# Patient Record
Sex: FEMALE | Race: OTHER | HISPANIC OR LATINO | URBAN - METROPOLITAN AREA
[De-identification: names, ages, dates, MRNs, and addresses within clinical notes are randomized per-mention and may not be internally consistent; named-entity substitution may affect disease eponyms.]

---

## 2021-04-06 ENCOUNTER — EMERGENCY (EMERGENCY)
Facility: HOSPITAL | Age: 81
LOS: 1 days | Discharge: HOME | End: 2021-04-06
Attending: EMERGENCY MEDICINE
Payer: COMMERCIAL

## 2021-04-06 VITALS
TEMPERATURE: 99 F | OXYGEN SATURATION: 98 % | DIASTOLIC BLOOD PRESSURE: 74 MMHG | RESPIRATION RATE: 18 BRPM | SYSTOLIC BLOOD PRESSURE: 117 MMHG | HEART RATE: 90 BPM | WEIGHT: 102.96 LBS

## 2021-04-06 LAB
ALBUMIN SERPL ELPH-MCNC: 3.6 G/DL — SIGNIFICANT CHANGE UP (ref 3.5–5)
ALP SERPL-CCNC: 84 U/L — SIGNIFICANT CHANGE UP (ref 40–120)
ALT FLD-CCNC: 20 U/L DA — SIGNIFICANT CHANGE UP (ref 10–60)
ANION GAP SERPL CALC-SCNC: 9 MMOL/L — SIGNIFICANT CHANGE UP (ref 5–17)
APPEARANCE UR: ABNORMAL
APTT BLD: 28.6 SEC — SIGNIFICANT CHANGE UP (ref 27.5–35.5)
AST SERPL-CCNC: 17 U/L — SIGNIFICANT CHANGE UP (ref 10–40)
BACTERIA # UR AUTO: ABNORMAL /HPF
BASOPHILS # BLD AUTO: 0.02 K/UL — SIGNIFICANT CHANGE UP (ref 0–0.2)
BASOPHILS NFR BLD AUTO: 0.2 % — SIGNIFICANT CHANGE UP (ref 0–2)
BILIRUB SERPL-MCNC: 0.3 MG/DL — SIGNIFICANT CHANGE UP (ref 0.2–1.2)
BILIRUB UR-MCNC: NEGATIVE — SIGNIFICANT CHANGE UP
BLD GP AB SCN SERPL QL: SIGNIFICANT CHANGE UP
BUN SERPL-MCNC: 20 MG/DL — HIGH (ref 7–18)
CALCIUM SERPL-MCNC: 8.9 MG/DL — SIGNIFICANT CHANGE UP (ref 8.4–10.5)
CHLORIDE SERPL-SCNC: 107 MMOL/L — SIGNIFICANT CHANGE UP (ref 96–108)
CO2 SERPL-SCNC: 23 MMOL/L — SIGNIFICANT CHANGE UP (ref 22–31)
COLOR SPEC: YELLOW — SIGNIFICANT CHANGE UP
CREAT SERPL-MCNC: 0.58 MG/DL — SIGNIFICANT CHANGE UP (ref 0.5–1.3)
DIFF PNL FLD: ABNORMAL
EOSINOPHIL # BLD AUTO: 0.03 K/UL — SIGNIFICANT CHANGE UP (ref 0–0.5)
EOSINOPHIL NFR BLD AUTO: 0.3 % — SIGNIFICANT CHANGE UP (ref 0–6)
EPI CELLS # UR: SIGNIFICANT CHANGE UP /HPF
GLUCOSE SERPL-MCNC: 112 MG/DL — HIGH (ref 70–99)
GLUCOSE UR QL: NEGATIVE — SIGNIFICANT CHANGE UP
HCT VFR BLD CALC: 39 % — SIGNIFICANT CHANGE UP (ref 34.5–45)
HGB BLD-MCNC: 12.3 G/DL — SIGNIFICANT CHANGE UP (ref 11.5–15.5)
IMM GRANULOCYTES NFR BLD AUTO: 0.3 % — SIGNIFICANT CHANGE UP (ref 0–1.5)
INR BLD: 1.04 RATIO — SIGNIFICANT CHANGE UP (ref 0.88–1.16)
KETONES UR-MCNC: ABNORMAL
LEUKOCYTE ESTERASE UR-ACNC: ABNORMAL
LIDOCAIN IGE QN: 167 U/L — SIGNIFICANT CHANGE UP (ref 73–393)
LYMPHOCYTES # BLD AUTO: 1.34 K/UL — SIGNIFICANT CHANGE UP (ref 1–3.3)
LYMPHOCYTES # BLD AUTO: 14.2 % — SIGNIFICANT CHANGE UP (ref 13–44)
MCHC RBC-ENTMCNC: 27.2 PG — SIGNIFICANT CHANGE UP (ref 27–34)
MCHC RBC-ENTMCNC: 31.5 GM/DL — LOW (ref 32–36)
MCV RBC AUTO: 86.1 FL — SIGNIFICANT CHANGE UP (ref 80–100)
MONOCYTES # BLD AUTO: 0.7 K/UL — SIGNIFICANT CHANGE UP (ref 0–0.9)
MONOCYTES NFR BLD AUTO: 7.4 % — SIGNIFICANT CHANGE UP (ref 2–14)
NEUTROPHILS # BLD AUTO: 7.33 K/UL — SIGNIFICANT CHANGE UP (ref 1.8–7.4)
NEUTROPHILS NFR BLD AUTO: 77.6 % — HIGH (ref 43–77)
NITRITE UR-MCNC: POSITIVE
NRBC # BLD: 0 /100 WBCS — SIGNIFICANT CHANGE UP (ref 0–0)
PH UR: 5 — SIGNIFICANT CHANGE UP (ref 5–8)
PLATELET # BLD AUTO: 235 K/UL — SIGNIFICANT CHANGE UP (ref 150–400)
POTASSIUM SERPL-MCNC: 4.1 MMOL/L — SIGNIFICANT CHANGE UP (ref 3.5–5.3)
POTASSIUM SERPL-SCNC: 4.1 MMOL/L — SIGNIFICANT CHANGE UP (ref 3.5–5.3)
PROT SERPL-MCNC: 7.7 G/DL — SIGNIFICANT CHANGE UP (ref 6–8.3)
PROT UR-MCNC: 15
PROTHROM AB SERPL-ACNC: 12.4 SEC — SIGNIFICANT CHANGE UP (ref 10.6–13.6)
RBC # BLD: 4.53 M/UL — SIGNIFICANT CHANGE UP (ref 3.8–5.2)
RBC # FLD: 13.5 % — SIGNIFICANT CHANGE UP (ref 10.3–14.5)
RBC CASTS # UR COMP ASSIST: ABNORMAL /HPF (ref 0–2)
SARS-COV-2 RNA SPEC QL NAA+PROBE: SIGNIFICANT CHANGE UP
SODIUM SERPL-SCNC: 139 MMOL/L — SIGNIFICANT CHANGE UP (ref 135–145)
SP GR SPEC: 1.02 — SIGNIFICANT CHANGE UP (ref 1.01–1.02)
UROBILINOGEN FLD QL: 1
WBC # BLD: 9.45 K/UL — SIGNIFICANT CHANGE UP (ref 3.8–10.5)
WBC # FLD AUTO: 9.45 K/UL — SIGNIFICANT CHANGE UP (ref 3.8–10.5)
WBC UR QL: SIGNIFICANT CHANGE UP /HPF (ref 0–5)

## 2021-04-06 PROCEDURE — 71260 CT THORAX DX C+: CPT | Mod: 26,MA

## 2021-04-06 PROCEDURE — 99285 EMERGENCY DEPT VISIT HI MDM: CPT

## 2021-04-06 PROCEDURE — 70450 CT HEAD/BRAIN W/O DYE: CPT | Mod: 26,MA

## 2021-04-06 PROCEDURE — 74177 CT ABD & PELVIS W/CONTRAST: CPT | Mod: 26,MA

## 2021-04-06 PROCEDURE — 71045 X-RAY EXAM CHEST 1 VIEW: CPT | Mod: 26

## 2021-04-06 RX ORDER — CEFTRIAXONE 500 MG/1
1000 INJECTION, POWDER, FOR SOLUTION INTRAMUSCULAR; INTRAVENOUS ONCE
Refills: 0 | Status: COMPLETED | OUTPATIENT
Start: 2021-04-06 | End: 2021-04-06

## 2021-04-06 NOTE — ED PROVIDER NOTE - PATIENT PORTAL LINK FT
You can access the FollowMyHealth Patient Portal offered by [facility] by registering at the following website: [email or web address]. By joining [employer]’s FollowMyHealth portal, you will also be able to view your health information using other applications (apps) compatible with our system.

## 2021-04-06 NOTE — ED PROVIDER NOTE - WORK/EXCUSE FORM DATE
[FreeTextEntry1] : We reviewed the results of the sonogram from last week, and gave reassurances. \par \par The results of her GCT as well as the fasting and post prandial values were reviewed. Her fasting and post prandial values are all within normal range, and will continue to be managed with dietary adjustments. \par \par She is scheduled to have a telehealth consultation with [facility] in 1 week.\par \par We discussed the management plan as far as fetal monitoring going forward. 
09-Apr-2021

## 2021-04-06 NOTE — ED PROVIDER NOTE - CLINICAL SUMMARY MEDICAL DECISION MAKING FREE TEXT BOX
Significant pain and tenderness to palpation of left abdomen and left flank after fall. Will get xrays, blood work, Ct scan to r/o retroperitoneal bleed, spleen lac. Vital signs are normal stable.

## 2021-04-06 NOTE — ED PROVIDER NOTE - OBJECTIVE STATEMENT
81 y/o F pt, as per pt no medical history and not on any medications c/o fall yesterday. Pt states that she lost balance and fell down. Since fall, patient has been experiencing significant left sided abdominal pain. Denies any headache, dizziness, neuro symptoms, extremity pains, nausea, vomiting, diarrhea, respiratory symptoms.

## 2021-04-06 NOTE — ED PROVIDER NOTE - NSFOLLOWUPINSTRUCTIONS_ED_ALL_ED_FT
Urinary Tract Infection in Older Adults    WHAT YOU NEED TO KNOW:    What is a urinary tract infection? A urinary tract infection (UTI) is caused by bacteria that get inside your urinary tract. Your urinary tract includes your kidneys, ureters, bladder, and urethra. Urine is made in your kidneys, and it flows from the ureters to the bladder. Urine leaves the bladder through the urethra. A UTI is more common in your lower urinary tract, which includes your bladder and urethra.    Kidney, Ureters, Bladder         What increases my risk for a UTI?   •A UTI within the last 3 months      •Menopause in women      •Enlarged prostate in men      •Medicines that affect urination      •Urinary incontinence (you cannot control your bladder)      •Sexual intercourse      •Medical conditions, such as diabetes or obesity      •Urinary tract problems, such as a narrowing, kidney stones, or inability to empty your bladder completely      What are the signs and symptoms of a UTI?   •Fever and chills      •Pain or burning when you urinate      •Urine that smells bad or looks cloudy, or blood in your urine      •Urinating more often or waking from sleep to urinate      •Sudden, strong need to urinate      •Pain or pressure in your lower abdomen      •Leaking urine      •Confusion or agitation      •Fatigue, shakiness, and weakness      How is a UTI diagnosed? Your healthcare provider will ask about your symptoms. He or she may also examine you. You may need any of the following:   •A urinalysis will give information about your urinary tract and overall health.      •A urine culture may show the type of germ causing the infection. You may need this test again if you continue to have signs and symptoms after a UTI is treated.      How is a UTI treated? Medicines treat the bacterial infection or decrease pain and burning when you urinate. You may also need medicines to decrease the urge to urinate often. If you have UTIs often (called recurrent UTIs), you may be given antibiotics to take regularly. You will be given directions for when and how to use antibiotics. The goal is to prevent UTIs but not cause antibiotic resistance by using antibiotics too often.    How can I manage my symptoms?   •Drink liquids as directed. Liquids can help flush bacteria from your urinary tract. Ask how much liquid to drink each day and which liquids are best for you. You may need to drink more liquids than usual to help flush out the bacteria. Do not drink alcohol, caffeine, and citrus juices. These can irritate your bladder and increase your symptoms.      •Apply heat on your abdomen for 20 to 30 minutes every 2 hours for as many days as directed. Heat helps decrease discomfort and pressure in your bladder.      How can I help prevent a UTI?   •Urinate when you feel the urge. Do not hold your urine. Bacteria can grow if urine stays in the bladder too long. It may be helpful to urinate at least every 3 to 4 hours.      •Urinate after you have sex to flush away bacteria that can enter your urinary tract during sex.      •Wear cotton underwear and clothes that are loose. Tight pants and nylon underwear can trap moisture and cause bacteria to grow.      •Cranberry juice or cranberry supplements may help prevent UTIs. Your healthcare provider can recommend the right juice or supplement for you.      •Women should wipe front to back after urinating or having a bowel movement. This may prevent germs from getting into the urinary tract. Do not douche or use feminine deodorants. These can change the chemical balance in your vagina. You may also be given vaginal estrogen medicine. This medicine helps prevent recurrent UTIs in women who have gone through menopause or are in frederic-menopause.      When should I seek immediate care?   •You are urinating very little or not at all.      •You are vomiting.      •You have a high fever with shaking chills.       •You have side or back pain that gets worse.      When should I call my doctor?   •You have a fever.      •You are a woman and you have increased white or yellow discharge from your vagina.      •You do not feel better after 2 days of taking antibiotics.      •You have questions or concerns about your condition or care.      CARE AGREEMENT:    You have the right to help plan your care. Learn about your health condition and how it may be treated. Discuss treatment options with your healthcare providers to decide what care you want to receive. You always have the right to refuse treatment.

## 2021-04-06 NOTE — ED PROVIDER NOTE - PROGRESS NOTE DETAILS
wide mediastinum on CXR, will add CT chest Blaine DO: Received sign out from Dr. San.   IMPRESSION:  CT CHEST: No gross acute abnormality of the chest.  CT ABDOMEN AND PELVIS:  1. Moderate loss of height of the L1 and L3 vertebral bodies of questionable age, demonstrating very mild bony retropulsion, however may be chronic. Recommend clinical correlation.  2. Otherwise, no acute abnormality within the abdomen and pelvis demonstrated. Pt is well appearing, denies current pain and adamantly requesting discharge. pt states will take taxi home and has HHA.   Pt is well appearing, has no new complaints and able to walk with normal gait. Pt is stable for discharge and follow up with medical doctor. Pt educated on care and need for follow up. Discussed anticipatory guidance and return precautions. Questions answered. I had a detailed discussion with the patient and/or guardian regarding the historical points, exam findings, and any diagnostic results supporting the discharge diagnosis. [facility] transport arranged for pt.

## 2021-04-06 NOTE — ED ADULT TRIAGE NOTE - CHIEF COMPLAINT QUOTE
pain to left lower ribs and left hip after rolling out of bed and fell to carpeted floor, denies LOC

## 2021-04-06 NOTE — ED PROVIDER NOTE - CARE PROVIDER_API CALL
Aiden Karimi  Internal Medicine  [address] [place] [address]  Phone: [phone]  Fax: [phone]  Follow Up Time:

## 2021-04-07 VITALS
RESPIRATION RATE: 18 BRPM | TEMPERATURE: 98 F | SYSTOLIC BLOOD PRESSURE: 132 MMHG | HEART RATE: 64 BPM | OXYGEN SATURATION: 96 % | DIASTOLIC BLOOD PRESSURE: 64 MMHG

## 2021-04-07 PROCEDURE — 86900 BLOOD TYPING SEROLOGIC ABO: CPT

## 2021-04-07 PROCEDURE — 36415 COLL VENOUS BLD VENIPUNCTURE: CPT

## 2021-04-07 PROCEDURE — 86901 BLOOD TYPING SEROLOGIC RH(D): CPT

## 2021-04-07 PROCEDURE — 70450 CT HEAD/BRAIN W/O DYE: CPT

## 2021-04-07 PROCEDURE — 85730 THROMBOPLASTIN TIME PARTIAL: CPT

## 2021-04-07 PROCEDURE — 85025 COMPLETE CBC W/AUTO DIFF WBC: CPT

## 2021-04-07 PROCEDURE — 80053 COMPREHEN METABOLIC PANEL: CPT

## 2021-04-07 PROCEDURE — 83690 ASSAY OF LIPASE: CPT

## 2021-04-07 PROCEDURE — 71045 X-RAY EXAM CHEST 1 VIEW: CPT

## 2021-04-07 PROCEDURE — 71260 CT THORAX DX C+: CPT

## 2021-04-07 PROCEDURE — 93005 ELECTROCARDIOGRAM TRACING: CPT

## 2021-04-07 PROCEDURE — 86850 RBC ANTIBODY SCREEN: CPT

## 2021-04-07 PROCEDURE — 85610 PROTHROMBIN TIME: CPT

## 2021-04-07 PROCEDURE — 74177 CT ABD & PELVIS W/CONTRAST: CPT

## 2021-04-07 PROCEDURE — 96374 THER/PROPH/DIAG INJ IV PUSH: CPT | Mod: XU

## 2021-04-07 PROCEDURE — 99284 EMERGENCY DEPT VISIT MOD MDM: CPT | Mod: 25

## 2021-04-07 PROCEDURE — 81001 URINALYSIS AUTO W/SCOPE: CPT

## 2021-04-07 PROCEDURE — 87635 SARS-COV-2 COVID-19 AMP PRB: CPT

## 2021-04-07 RX ORDER — CEFUROXIME AXETIL 250 MG
1 TABLET ORAL
Qty: 14 | Refills: 0
Start: 2021-04-07 | End: 2021-04-13

## 2021-04-07 RX ORDER — ACETAMINOPHEN 500 MG
650 TABLET ORAL ONCE
Refills: 0 | Status: COMPLETED | OUTPATIENT
Start: 2021-04-07 | End: 2021-04-07

## 2021-04-07 RX ADMIN — Medication 650 MILLIGRAM(S): at 00:42

## 2021-04-07 RX ADMIN — CEFTRIAXONE 100 MILLIGRAM(S): 500 INJECTION, POWDER, FOR SOLUTION INTRAMUSCULAR; INTRAVENOUS at 00:02

## 2021-04-07 NOTE — ED ADULT NURSE NOTE - NSIMPLEMENTINTERV_GEN_ALL_ED
Implemented All Fall Risk Interventions:  [place] to call system. Call bell, personal items and telephone within reach. Instruct patient to call for assistance. Room bathroom lighting operational. Non-slip footwear when patient is off stretcher. Physically safe environment: no spills, clutter or unnecessary equipment. Stretcher in lowest position, wheels locked, appropriate side rails in place. Provide visual cue, wrist band, yellow gown, etc. Monitor gait and stability. Monitor for mental status changes and reorient to person, place, and time. Review medications for side effects contributing to fall risk. Reinforce activity limits and safety measures with patient and family.

## 2021-04-10 ENCOUNTER — EMERGENCY (EMERGENCY)
Facility: HOSPITAL | Age: 81
LOS: 1 days | Discharge: HOME | End: 2021-04-10
Attending: STUDENT IN AN ORGANIZED HEALTH CARE EDUCATION/TRAINING PROGRAM
Payer: COMMERCIAL

## 2021-04-10 VITALS
TEMPERATURE: 99 F | HEART RATE: 87 BPM | RESPIRATION RATE: 18 BRPM | SYSTOLIC BLOOD PRESSURE: 152 MMHG | OXYGEN SATURATION: 98 % | DIASTOLIC BLOOD PRESSURE: 82 MMHG

## 2021-04-10 VITALS
TEMPERATURE: 98 F | HEART RATE: 89 BPM | OXYGEN SATURATION: 99 % | WEIGHT: 102.96 LBS | DIASTOLIC BLOOD PRESSURE: 74 MMHG | SYSTOLIC BLOOD PRESSURE: 122 MMHG | RESPIRATION RATE: 18 BRPM

## 2021-04-10 PROBLEM — Z78.9 OTHER SPECIFIED HEALTH STATUS: Chronic | Status: ACTIVE | Noted: 2021-04-06

## 2021-04-10 LAB
ALBUMIN SERPL ELPH-MCNC: 3.7 G/DL — SIGNIFICANT CHANGE UP (ref 3.5–5)
ALP SERPL-CCNC: 76 U/L — SIGNIFICANT CHANGE UP (ref 40–120)
ALT FLD-CCNC: 20 U/L DA — SIGNIFICANT CHANGE UP (ref 10–60)
ANION GAP SERPL CALC-SCNC: 7 MMOL/L — SIGNIFICANT CHANGE UP (ref 5–17)
APPEARANCE UR: CLEAR — SIGNIFICANT CHANGE UP
AST SERPL-CCNC: 36 U/L — SIGNIFICANT CHANGE UP (ref 10–40)
BACTERIA # UR AUTO: ABNORMAL /HPF
BASOPHILS # BLD AUTO: 0.01 K/UL — SIGNIFICANT CHANGE UP (ref 0–0.2)
BASOPHILS NFR BLD AUTO: 0.2 % — SIGNIFICANT CHANGE UP (ref 0–2)
BILIRUB SERPL-MCNC: 0.5 MG/DL — SIGNIFICANT CHANGE UP (ref 0.2–1.2)
BILIRUB UR-MCNC: NEGATIVE — SIGNIFICANT CHANGE UP
BUN SERPL-MCNC: 16 MG/DL — SIGNIFICANT CHANGE UP (ref 7–18)
CALCIUM SERPL-MCNC: 9 MG/DL — SIGNIFICANT CHANGE UP (ref 8.4–10.5)
CHLORIDE SERPL-SCNC: 107 MMOL/L — SIGNIFICANT CHANGE UP (ref 96–108)
CO2 SERPL-SCNC: 24 MMOL/L — SIGNIFICANT CHANGE UP (ref 22–31)
COLOR SPEC: YELLOW — SIGNIFICANT CHANGE UP
CREAT SERPL-MCNC: 0.65 MG/DL — SIGNIFICANT CHANGE UP (ref 0.5–1.3)
DIFF PNL FLD: ABNORMAL
EOSINOPHIL # BLD AUTO: 0.03 K/UL — SIGNIFICANT CHANGE UP (ref 0–0.5)
EOSINOPHIL NFR BLD AUTO: 0.7 % — SIGNIFICANT CHANGE UP (ref 0–6)
EPI CELLS # UR: ABNORMAL /HPF
GLUCOSE SERPL-MCNC: 102 MG/DL — HIGH (ref 70–99)
GLUCOSE UR QL: NEGATIVE — SIGNIFICANT CHANGE UP
HCT VFR BLD CALC: 39.7 % — SIGNIFICANT CHANGE UP (ref 34.5–45)
HGB BLD-MCNC: 12.7 G/DL — SIGNIFICANT CHANGE UP (ref 11.5–15.5)
IMM GRANULOCYTES NFR BLD AUTO: 0.2 % — SIGNIFICANT CHANGE UP (ref 0–1.5)
KETONES UR-MCNC: NEGATIVE — SIGNIFICANT CHANGE UP
LACTATE SERPL-SCNC: 1.3 MMOL/L — SIGNIFICANT CHANGE UP (ref 0.7–2)
LEUKOCYTE ESTERASE UR-ACNC: ABNORMAL
LIDOCAIN IGE QN: 118 U/L — SIGNIFICANT CHANGE UP (ref 73–393)
LYMPHOCYTES # BLD AUTO: 1.01 K/UL — SIGNIFICANT CHANGE UP (ref 1–3.3)
LYMPHOCYTES # BLD AUTO: 22.9 % — SIGNIFICANT CHANGE UP (ref 13–44)
MAGNESIUM SERPL-MCNC: 2.4 MG/DL — SIGNIFICANT CHANGE UP (ref 1.6–2.6)
MCHC RBC-ENTMCNC: 28 PG — SIGNIFICANT CHANGE UP (ref 27–34)
MCHC RBC-ENTMCNC: 32 GM/DL — SIGNIFICANT CHANGE UP (ref 32–36)
MCV RBC AUTO: 87.4 FL — SIGNIFICANT CHANGE UP (ref 80–100)
MONOCYTES # BLD AUTO: 0.48 K/UL — SIGNIFICANT CHANGE UP (ref 0–0.9)
MONOCYTES NFR BLD AUTO: 10.9 % — SIGNIFICANT CHANGE UP (ref 2–14)
NEUTROPHILS # BLD AUTO: 2.88 K/UL — SIGNIFICANT CHANGE UP (ref 1.8–7.4)
NEUTROPHILS NFR BLD AUTO: 65.1 % — SIGNIFICANT CHANGE UP (ref 43–77)
NITRITE UR-MCNC: NEGATIVE — SIGNIFICANT CHANGE UP
NRBC # BLD: 0 /100 WBCS — SIGNIFICANT CHANGE UP (ref 0–0)
PH UR: 7 — SIGNIFICANT CHANGE UP (ref 5–8)
PLATELET # BLD AUTO: 259 K/UL — SIGNIFICANT CHANGE UP (ref 150–400)
POTASSIUM SERPL-MCNC: 4.7 MMOL/L — SIGNIFICANT CHANGE UP (ref 3.5–5.3)
POTASSIUM SERPL-SCNC: 4.7 MMOL/L — SIGNIFICANT CHANGE UP (ref 3.5–5.3)
PROT SERPL-MCNC: 8.2 G/DL — SIGNIFICANT CHANGE UP (ref 6–8.3)
PROT UR-MCNC: 15
RBC # BLD: 4.54 M/UL — SIGNIFICANT CHANGE UP (ref 3.8–5.2)
RBC # FLD: 13.7 % — SIGNIFICANT CHANGE UP (ref 10.3–14.5)
RBC CASTS # UR COMP ASSIST: ABNORMAL /HPF (ref 0–2)
SODIUM SERPL-SCNC: 138 MMOL/L — SIGNIFICANT CHANGE UP (ref 135–145)
SP GR SPEC: 1.01 — SIGNIFICANT CHANGE UP (ref 1.01–1.02)
UROBILINOGEN FLD QL: NEGATIVE — SIGNIFICANT CHANGE UP
WBC # BLD: 4.42 K/UL — SIGNIFICANT CHANGE UP (ref 3.8–10.5)
WBC # FLD AUTO: 4.42 K/UL — SIGNIFICANT CHANGE UP (ref 3.8–10.5)
WBC UR QL: ABNORMAL /HPF (ref 0–5)

## 2021-04-10 PROCEDURE — 83690 ASSAY OF LIPASE: CPT

## 2021-04-10 PROCEDURE — 96365 THER/PROPH/DIAG IV INF INIT: CPT | Mod: XU

## 2021-04-10 PROCEDURE — 96361 HYDRATE IV INFUSION ADD-ON: CPT

## 2021-04-10 PROCEDURE — 80053 COMPREHEN METABOLIC PANEL: CPT

## 2021-04-10 PROCEDURE — 81001 URINALYSIS AUTO W/SCOPE: CPT

## 2021-04-10 PROCEDURE — 87086 URINE CULTURE/COLONY COUNT: CPT

## 2021-04-10 PROCEDURE — 99285 EMERGENCY DEPT VISIT HI MDM: CPT

## 2021-04-10 PROCEDURE — 36415 COLL VENOUS BLD VENIPUNCTURE: CPT

## 2021-04-10 PROCEDURE — 85025 COMPLETE CBC W/AUTO DIFF WBC: CPT

## 2021-04-10 PROCEDURE — 83605 ASSAY OF LACTIC ACID: CPT

## 2021-04-10 PROCEDURE — 99284 EMERGENCY DEPT VISIT MOD MDM: CPT | Mod: 25

## 2021-04-10 PROCEDURE — 74177 CT ABD & PELVIS W/CONTRAST: CPT | Mod: 26,MA

## 2021-04-10 PROCEDURE — 74177 CT ABD & PELVIS W/CONTRAST: CPT

## 2021-04-10 PROCEDURE — 83735 ASSAY OF MAGNESIUM: CPT

## 2021-04-10 RX ORDER — CEFTRIAXONE 500 MG/1
1000 INJECTION, POWDER, FOR SOLUTION INTRAMUSCULAR; INTRAVENOUS ONCE
Refills: 0 | Status: COMPLETED | OUTPATIENT
Start: 2021-04-10 | End: 2021-04-10

## 2021-04-10 RX ORDER — SODIUM CHLORIDE 9 MG/ML
1000 INJECTION INTRAMUSCULAR; INTRAVENOUS; SUBCUTANEOUS ONCE
Refills: 0 | Status: COMPLETED | OUTPATIENT
Start: 2021-04-10 | End: 2021-04-10

## 2021-04-10 RX ORDER — CEPHALEXIN 500 MG
1 CAPSULE ORAL
Qty: 10 | Refills: 0
Start: 2021-04-10 | End: 2021-04-14

## 2021-04-10 RX ADMIN — SODIUM CHLORIDE 1000 MILLILITER(S): 9 INJECTION INTRAMUSCULAR; INTRAVENOUS; SUBCUTANEOUS at 12:25

## 2021-04-10 RX ADMIN — CEFTRIAXONE 100 MILLIGRAM(S): 500 INJECTION, POWDER, FOR SOLUTION INTRAMUSCULAR; INTRAVENOUS at 15:25

## 2021-04-10 RX ADMIN — CEFTRIAXONE 1000 MILLIGRAM(S): 500 INJECTION, POWDER, FOR SOLUTION INTRAMUSCULAR; INTRAVENOUS at 15:49

## 2021-04-10 RX ADMIN — SODIUM CHLORIDE 1000 MILLILITER(S): 9 INJECTION INTRAMUSCULAR; INTRAVENOUS; SUBCUTANEOUS at 11:54

## 2021-04-10 NOTE — ED PROVIDER NOTE - PATIENT PORTAL LINK FT
You can access the FollowMyHealth Patient Portal offered by [facility] by registering at the following website: [email or web address]. By joining [employer]’s FollowMyHealth portal, you will also be able to view your health information using other applications (apps) compatible with our system.

## 2021-04-10 NOTE — ED PROVIDER NOTE - CLINICAL SUMMARY MEDICAL DECISION MAKING FREE TEXT BOX
80F presenting with luq abdominal pain. has tenderness on exam. seen in ED recently after a fall and treated for uti. concern for pancreatitis, pyelo. will get labs, CT abdomen. will reassess.

## 2021-04-10 NOTE — ED PROVIDER NOTE - PROGRESS NOTE DETAILS
patient updated on results. reports symptoms improved. no tenderness on re-exam. will discharge. Lee Rowe

## 2021-04-10 NOTE — ED PROVIDER NOTE - PHYSICAL EXAMINATION
General: well appearing female, no acute distress   HEENT: normocephalic, atraumatic   Respiratory: normal work of breathing, lungs clear to auscultation bilaterally   Cardiac: regular rate and rhythm   Abdomen: soft, LUQ tenderness to palpation, no guarding or rebound   MSK: no swelling or tenderness of lower extremities, moving all extremities spontaneously   Skin: warm, dry   Neuro: A&Ox3  Psych: appropriate affect

## 2021-04-10 NOTE — ED ADULT NURSE NOTE - OBJECTIVE STATEMENT
As per pt, c/o LUQ pain x3days. PT denies any radiation of pain, h/a, SOB, CP, n/v/d, f/c, or cough.

## 2021-04-10 NOTE — ED ADULT NURSE NOTE - CAS TRG GENERAL AIRWAY, MLM
Alert-The patient is alert, awake and responds to voice. The patient is oriented to time, place, and person. The triage nurse is able to obtain subjective information. [employer]

## 2021-04-10 NOTE — ED PROVIDER NOTE - NSFOLLOWUPINSTRUCTIONS_ED_ALL_ED_FT
You were seen in the emergency department for abdominal pain.     Please follow-up with your primary care doctor in the next 24-48 hours.     If you have any worsening pain, severe chest pain, shortness of breath, nausea or vomiting, please return to the emergency department. Lo vieron en el departamento de emergencias por dolor abdominal y se descubrió que tenía orlando infección urinaria.    Rosa un seguimiento con koenig médico de atención primaria en las próximas 24 a 48 horas.    Termine todos luisa antibióticos incluso si luisa síntomas mejoran.    Si tiene un dolor que empeora, dolor severo en el pecho, dificultad para respirar, náuseas o vómitos, regrese al departamento de emergencias.    Translation via Google Translate. Cannot guarantee accuracy.     You were seen in the emergency department for abdominal pain and were found to have a UTI.  Please follow-up with your primary care doctor in the next 24-48 hours.   Please finish all of your antibiotics even if your symptoms improve.   If you have any worsening pain, severe chest pain, shortness of breath, nausea or vomiting, please return to the emergency department.

## 2021-04-10 NOTE — ED ADULT NURSE NOTE - COVID-19 ORDERING FACILITY
Spine appears normal, range of motion is not limited, no muscle or joint tenderness
[facility]

## 2021-04-10 NOTE — ED PROVIDER NOTE - OBJECTIVE STATEMENT
80F, no significant pmh, presenting with abdominal pain. patient reports three days of left upper abdominal pain. has some nausea but no vomiting, pain or burning with urination, pain or swelling of lower extremities.

## 2021-04-11 LAB
CULTURE RESULTS: NO GROWTH — SIGNIFICANT CHANGE UP
SPECIMEN SOURCE: SIGNIFICANT CHANGE UP

## 2021-07-02 ENCOUNTER — EMERGENCY (EMERGENCY)
Facility: HOSPITAL | Age: 81
LOS: 1 days | Discharge: HOME | End: 2021-07-02
Attending: EMERGENCY MEDICINE
Payer: COMMERCIAL

## 2021-07-02 VITALS
RESPIRATION RATE: 18 BRPM | HEART RATE: 74 BPM | DIASTOLIC BLOOD PRESSURE: 70 MMHG | SYSTOLIC BLOOD PRESSURE: 121 MMHG | OXYGEN SATURATION: 95 % | TEMPERATURE: 98 F | WEIGHT: 104.94 LBS

## 2021-07-02 LAB
ALBUMIN SERPL ELPH-MCNC: 3.3 G/DL — LOW (ref 3.5–5)
ALP SERPL-CCNC: 86 U/L — SIGNIFICANT CHANGE UP (ref 40–120)
ALT FLD-CCNC: 40 U/L DA — SIGNIFICANT CHANGE UP (ref 10–60)
ANION GAP SERPL CALC-SCNC: 8 MMOL/L — SIGNIFICANT CHANGE UP (ref 5–17)
AST SERPL-CCNC: 39 U/L — SIGNIFICANT CHANGE UP (ref 10–40)
BASOPHILS # BLD AUTO: 0.02 K/UL — SIGNIFICANT CHANGE UP (ref 0–0.2)
BASOPHILS NFR BLD AUTO: 0.3 % — SIGNIFICANT CHANGE UP (ref 0–2)
BILIRUB SERPL-MCNC: 0.4 MG/DL — SIGNIFICANT CHANGE UP (ref 0.2–1.2)
BUN SERPL-MCNC: 23 MG/DL — HIGH (ref 7–18)
CALCIUM SERPL-MCNC: 8.8 MG/DL — SIGNIFICANT CHANGE UP (ref 8.4–10.5)
CHLORIDE SERPL-SCNC: 111 MMOL/L — HIGH (ref 96–108)
CO2 SERPL-SCNC: 21 MMOL/L — LOW (ref 22–31)
CREAT SERPL-MCNC: 0.57 MG/DL — SIGNIFICANT CHANGE UP (ref 0.5–1.3)
EOSINOPHIL # BLD AUTO: 0.1 K/UL — SIGNIFICANT CHANGE UP (ref 0–0.5)
EOSINOPHIL NFR BLD AUTO: 1.6 % — SIGNIFICANT CHANGE UP (ref 0–6)
GLUCOSE SERPL-MCNC: 101 MG/DL — HIGH (ref 70–99)
HCT VFR BLD CALC: 39.1 % — SIGNIFICANT CHANGE UP (ref 34.5–45)
HGB BLD-MCNC: 12.4 G/DL — SIGNIFICANT CHANGE UP (ref 11.5–15.5)
IMM GRANULOCYTES NFR BLD AUTO: 0.5 % — SIGNIFICANT CHANGE UP (ref 0–1.5)
LYMPHOCYTES # BLD AUTO: 1.12 K/UL — SIGNIFICANT CHANGE UP (ref 1–3.3)
LYMPHOCYTES # BLD AUTO: 17.5 % — SIGNIFICANT CHANGE UP (ref 13–44)
MCHC RBC-ENTMCNC: 27.5 PG — SIGNIFICANT CHANGE UP (ref 27–34)
MCHC RBC-ENTMCNC: 31.7 GM/DL — LOW (ref 32–36)
MCV RBC AUTO: 86.7 FL — SIGNIFICANT CHANGE UP (ref 80–100)
MONOCYTES # BLD AUTO: 0.74 K/UL — SIGNIFICANT CHANGE UP (ref 0–0.9)
MONOCYTES NFR BLD AUTO: 11.6 % — SIGNIFICANT CHANGE UP (ref 2–14)
NEUTROPHILS # BLD AUTO: 4.39 K/UL — SIGNIFICANT CHANGE UP (ref 1.8–7.4)
NEUTROPHILS NFR BLD AUTO: 68.5 % — SIGNIFICANT CHANGE UP (ref 43–77)
NRBC # BLD: 0 /100 WBCS — SIGNIFICANT CHANGE UP (ref 0–0)
PLATELET # BLD AUTO: 230 K/UL — SIGNIFICANT CHANGE UP (ref 150–400)
POTASSIUM SERPL-MCNC: 4.8 MMOL/L — SIGNIFICANT CHANGE UP (ref 3.5–5.3)
POTASSIUM SERPL-SCNC: 4.8 MMOL/L — SIGNIFICANT CHANGE UP (ref 3.5–5.3)
PROT SERPL-MCNC: 7.8 G/DL — SIGNIFICANT CHANGE UP (ref 6–8.3)
RBC # BLD: 4.51 M/UL — SIGNIFICANT CHANGE UP (ref 3.8–5.2)
RBC # FLD: 12.9 % — SIGNIFICANT CHANGE UP (ref 10.3–14.5)
SODIUM SERPL-SCNC: 140 MMOL/L — SIGNIFICANT CHANGE UP (ref 135–145)
WBC # BLD: 6.4 K/UL — SIGNIFICANT CHANGE UP (ref 3.8–10.5)
WBC # FLD AUTO: 6.4 K/UL — SIGNIFICANT CHANGE UP (ref 3.8–10.5)

## 2021-07-02 PROCEDURE — 36415 COLL VENOUS BLD VENIPUNCTURE: CPT

## 2021-07-02 PROCEDURE — 70450 CT HEAD/BRAIN W/O DYE: CPT | Mod: 26,MA

## 2021-07-02 PROCEDURE — 85025 COMPLETE CBC W/AUTO DIFF WBC: CPT

## 2021-07-02 PROCEDURE — 96374 THER/PROPH/DIAG INJ IV PUSH: CPT

## 2021-07-02 PROCEDURE — 99284 EMERGENCY DEPT VISIT MOD MDM: CPT | Mod: 25

## 2021-07-02 PROCEDURE — 99285 EMERGENCY DEPT VISIT HI MDM: CPT

## 2021-07-02 PROCEDURE — 80053 COMPREHEN METABOLIC PANEL: CPT

## 2021-07-02 PROCEDURE — 70450 CT HEAD/BRAIN W/O DYE: CPT | Mod: MA

## 2021-07-02 RX ORDER — SODIUM CHLORIDE 9 MG/ML
1000 INJECTION INTRAMUSCULAR; INTRAVENOUS; SUBCUTANEOUS ONCE
Refills: 0 | Status: COMPLETED | OUTPATIENT
Start: 2021-07-02 | End: 2021-07-02

## 2021-07-02 RX ORDER — ACETAMINOPHEN 500 MG
650 TABLET ORAL ONCE
Refills: 0 | Status: COMPLETED | OUTPATIENT
Start: 2021-07-02 | End: 2021-07-02

## 2021-07-02 RX ORDER — KETOROLAC TROMETHAMINE 30 MG/ML
15 SYRINGE (ML) INJECTION ONCE
Refills: 0 | Status: DISCONTINUED | OUTPATIENT
Start: 2021-07-02 | End: 2021-07-02

## 2021-07-02 RX ADMIN — SODIUM CHLORIDE 1000 MILLILITER(S): 9 INJECTION INTRAMUSCULAR; INTRAVENOUS; SUBCUTANEOUS at 16:51

## 2021-07-02 RX ADMIN — Medication 15 MILLIGRAM(S): at 19:05

## 2021-07-02 RX ADMIN — Medication 650 MILLIGRAM(S): at 16:52

## 2021-07-02 NOTE — ED PROVIDER NOTE - CLINICAL SUMMARY MEDICAL DECISION MAKING FREE TEXT BOX
81 y/o F patient presents to the ED with c/o atypical headache. Will do labs, CT head, give IV fluids, pain meds and reassess.

## 2021-07-02 NOTE — ED PROVIDER NOTE - PATIENT PORTAL LINK FT
You can access the FollowMyHealth Patient Portal offered by [facility] by registering at the following website: [email or web address]. By joining [employer]’s FollowMyHealth portal, you will also be able to view your health information using other applications (apps) compatible with our system.

## 2021-07-02 NOTE — ED PROVIDER NOTE - NSFOLLOWUPINSTRUCTIONS_ED_ALL_ED_FT
ACUTE HEADACHE - AfterCare(R) Instructions(ER/ED)           Dolor de luiz jennifer    LO QUE NECESITA SABER:    El dolor de luiz jennifer es un dolor o molestia que comienza de repente y empeora rápidamente. Usted puede tener un dolor de luiz jennifer sólo cuando siente estrés o come ciertos alimentos. Otro tipo dolor de luiz jennifer puede producirse todos los días y a veces varias veces al día.    INSTRUCCIONES SOBRE EL [facility] HOSPITALARIA:    Regrese a la janes de emergencias si:  •Usted tiene dolor intenso.      •Usted tiene entumecimiento en un lado de koenig jamshid o cuerpo.      •Usted tiene un dolor de luiz que ocurre después de un golpe en la luiz, [place] caída u otro trauma.      •Tiene dolor de luiz, está olvidadizo o confundido o tiene dificultad para hablar.      •Tiene dolor de luiz, rigidez en el ivan y fiebre.      Comuníquese con koenig médico si:  •Usted tiene un dolor de luiz bee y está vomitando.      •Usted tiene dolor de luiz todos los días y no se ronaldo aun después de tratarlo.      •Nury madhu de luiz cambian u ocurren nuevos síntomas cuando tiene dolor de luiz.      •Usted tiene preguntas o inquietudes acerca de koenig condición o cuidado.      Medicamentos:Es posible que usted necesite alguno de los siguientes:   •Los analgésicos recetadospodrían administrarse. El medicamento que recomienda koenig médico dependerá del tipo de dolor de luiz que tenga. Usted necesitará amandeep medicamentos para el dolor de luiz según las indicaciones para evitar un problema llamado dolor de luiz de rebote. Estos madhu de luiz ocurren con el uso regular de analgésicos para los trastornos de dolor de luiz.      •Los KEMAR,estrella el ibuprofeno, ayudan a disminuir la inflamación, el dolor y la fiebre. Vanda medicamento está disponible con o sin [place] receta médica. Los KEMAR pueden causar sangrado estomacal o problemas renales en ciertas personas. Si usted stoney un medicamento anticoagulante, siempre pregúntele a koenig médico si los KEMAR son seguros para usted. Siempre daksha la etiqueta de vanda medicamento y siga las instrucciones.      •Acetaminofénalivia el dolor y baja la fiebre. Está disponible sin receta médica. Pregunte la cantidad y la frecuencia con que debe tomarlos. Siga las indicaciones. Daksha las etiquetas de todos los demás medicamentos que esté usando para saber si también contienen acetaminofén, o pregunte a koenig médico o farmacéutico. El acetaminofén puede causar daño en el hígado cuando no se stoney de forma correcta. No use más de 3 gramos (3,000 miligramos) en total de acetaminofeno en un día.      •Antidepresivosse pueden administrar para algunos tipos de madhu de luiz.      •Crockett nury medicamentos estrella se le haya indicado.Consulte con koenig médico si usted maryam que koenig medicamento no le está ayudando o si presenta efectos secundarios. Infórmele si es alérgico a cualquier medicamento. Mantenga [place] lista actualizada de los medicamentos, las vitaminas y los productos herbales que stoney. Incluya los siguientes datos de los medicamentos: cantidad, frecuencia y motivo de administración. Traiga con usted la lista o los envases de las píldoras a nury citas de seguimiento. Lleve la lista de los medicamentos con usted en kamila de [place] emergencia.      El manejo de nury síntomas:  •Aplique hielo o caloren la keaton donde koenig hijo siente el dolor de luiz. Utilice un paquete (compresa) de hielo o calor. Para un paquete de hielo, también puede colocar hielo molido en [place] bolsa plástica. Cubra el paquete de hielo o la bolsa con [place] toalla pequeña antes de aplicarla en la piel. Tanto el hielo estrella el calor ayudan a reducir el dolor, y el calor también contribuye a reducir los espasmos musculares. Aplique calor livan 20 a 30 minutos cada 2 horas. Aplique hielo livan 15 a 20 minutos cada hora. Aplique calor o hielo livan el tiempo y la cantidad de días que se le indique. Usted puede alternar el calor y el hielo.      •Relaje nury músculos.Acuéstese en [place] posición cómoda y cierre nury ojos. Relaje nury músculos lentamente. Comience por los dedos de los pies y avance hacia arriba al mack de koenig cuerpo.      •Registre en un diario nury madhu de luiz.Escriba cuándo comienzan y terminan nury migrañas. Incluya nury síntomas y qué estaba haciendo cuando comenzó la migraña. Registre lo que comió y lo que tomó las 24 horas previas al comienzo de koenig migraña. Describa el dolor y dónde le duele: Lleve un registro de lo que hizo para tratar koenig migraña y si obtuvo un resultado satisfactorio.      Cómo prevenir un dolor de luiz jennifer:  •Evite cualquier cosa que provoque un dolor de luiz jennifer.Los ejemplos incluyen la exposición a sustancias químicas, las grandes altitudes o no dormir lo suficiente. Maryam [place] rutina para dormir. Acuéstese y levántese todos los días a la misma hora. No utilice aparatos electrónicos antes de acostarse. Pueden provocarle un dolor de luiz o impedirle dormir aurea.      •No fume.La nicotina y otras sustancias químicas en los cigarrillos y puros pueden desencadenar un dolor de luiz jennifer o empeorarlo. Pida información a koenig médico si usted actualmente fuma y necesita ayuda para dejar de fumar. Los cigarrillos electrónicos o el tabaco sin humo igualmente contienen nicotina. Consulte con koenig médico antes de utilizar estos productos.      •Limite el consumo de alcohol según le indicaron.El alcohol puede provocar un dolor de luiz jennifer o empeorarlo. Si usted tiene madhu de luiz de racimo, no blu alcohol livan un episodio. Para otros tipos de madhu de luiz, pregúntele a koenig proveedor de atención médica si es seguro para usted beber alcohol. Pregunte cuál es la cantidad basilio que puede beber y con qué frecuencia.      •Ejercítese según indicaciones.El ejercicio puede reducir la tensión y ayudarlo a aliviar el dolor de luiz. Propóngase hacer 30 minutos de actividad física patience todos los días de la semana. Koenig médico puede ayudarle a crear un plan de ejercicios.      •Consuma alimentos saludables y variados.Los alimentos saludables incluyen las frutas, verduras, productos lácteos bajos en grasa, pepe magras, pescado y frijoles cocidos. Koenig médico o dietista puede ayudarle a crear planes de comidas si desea evitar los alimentos que provocan madhu de luiz.      Acuda a nury consultas de control con koenig médico según le indicaron.Traiga koenig registro de madhu de luiz con usted cuando visite a koenig médico. Anote nury preguntas para que se acuerde de hacerlas livan nury visitas.       © Copyright [employer] 2021           back to top                          © Copyright [employer] 2021               ACUTE HEADACHE - AfterCare(R) Instructions(ER/ED)           Acute Headache    WHAT YOU NEED TO KNOW:    An acute headache is pain or discomfort that starts suddenly and gets worse quickly. You may have an acute headache only when you feel stress or eat certain foods. Other acute headache pain can happen every day, and sometimes several times a day.     DISCHARGE INSTRUCTIONS:    Return to the emergency department if:   •You have severe pain.      •You have numbness or weakness on one side of your face or body.      •You have a headache that occurs after a blow to the head, a fall, or other trauma.       •You have a headache, are forgetful or confused, or have trouble speaking.      •You have a headache, stiff neck, and a fever.      Contact your healthcare provider if:   •You have a constant headache and are vomiting.      •You have a headache each day that does not get better, even after treatment.      •You have changes in your headaches, or new symptoms that occur when you have a headache.      •You have questions or concerns about your condition or care.      Medicines: You may need any of the following:   •Prescription pain medicine may be given. The medicine your healthcare provider recommends will depend on the kind of headaches you have. You will need to take prescription headache medicines as directed to prevent a problem called rebound headache. These headaches happen with regular use of pain relievers for headache disorders.      •NSAIDs, such as ibuprofen, help decrease swelling, pain, and fever. This medicine is available with or without a doctor's order. NSAIDs can cause stomach bleeding or kidney problems in certain people. If you take blood thinner medicine, always ask your healthcare provider if NSAIDs are safe for you. Always read the medicine label and follow directions.      •Acetaminophen decreases pain and fever. It is available without a doctor's order. Ask how much to take and how often to take it. Follow directions. Read the labels of all other medicines you are using to see if they also contain acetaminophen, or ask your doctor or pharmacist. Acetaminophen can cause liver damage if not taken correctly. Do not use more than 3 grams (3,000 milligrams) total of acetaminophen in one day.       •Antidepressants may be given for some kinds of headaches.       •Take your medicine as directed. Contact your healthcare provider if you think your medicine is not helping or if you have side effects. Tell him or her if you are allergic to any medicine. Keep a list of the medicines, vitamins, and herbs you take. Include the amounts, and when and why you take them. Bring the list or the pill bottles to follow-up visits. Carry your medicine list with you in case of an emergency.      Manage your symptoms:   •Apply heat or ice on the headache area. Use a heat or ice pack. For an ice pack, you can also put crushed ice in a plastic bag. Cover the pack or bag with a towel before you apply it to your skin. Ice and heat both help decrease pain, and heat also helps decrease muscle spasms. Apply heat for 20 to 30 minutes every 2 hours. Apply ice for 15 to 20 minutes every hour. Apply heat or ice for as long and for as many days as directed. You may alternate heat and ice.      •Relax your muscles. Lie down in a comfortable position and close your eyes. Relax your muscles slowly. Start at your toes and work your way up your body.      •Keep a record of your headaches. Write down when your headaches start and stop. Include your symptoms and what you were doing when the headache began. Record what you ate or drank for 24 hours before the headache started. Describe the pain and where it hurts. Keep track of what you did to treat your headache and if it worked.       Prevent an acute headache:   •Avoid anything that triggers an acute headache. Examples include exposure to chemicals, going to high altitude, or not getting enough sleep. Create a regular sleep routine. Go to sleep at the same time and wake up at the same time each day. Do not use electronic devices before bedtime. These may trigger a headache or prevent you from sleeping well.      •Do not smoke. Nicotine and other chemicals in cigarettes and cigars can trigger an acute headache or make it worse. Ask your healthcare provider for information if you currently smoke and need help to quit. E-cigarettes or smokeless tobacco still contain nicotine. Talk to your healthcare provider before you use these products.       •Limit alcohol as directed. Alcohol can trigger an acute headache or make it worse. If you have cluster headaches, do not drink alcohol during an episode. For other types of headaches, ask your healthcare provider if it is safe for you to drink alcohol. Ask how much is safe for you to drink, and how often.      •Exercise as directed. Exercise can reduce tension and help with headache pain. Aim for 30 minutes of physical activity on most days of the week. Your healthcare provider can help you create an exercise plan.      •Eat a variety of healthy foods. Healthy foods include fruits, vegetables, low-fat dairy products, lean meats, fish, whole grains, and cooked beans. Your healthcare provider or dietitian can help you create meals plans if you need to avoid foods that trigger headaches.      Follow up with your healthcare provider as directed: Bring your headache record with you when you see your healthcare provider. Write down your questions so you remember to ask them during your visits.       © Copyright [employer] 2021           back to top                          © Copyright [employer] 2021

## 2021-07-02 NOTE — ED PROVIDER NOTE - OBJECTIVE STATEMENT
81 y/o F patient with no significant PMHx presents to the ED with c/o diffuse headache x4days gradual onset. Patient denies any numbness, nausea and vomiting, weakness, neck stiffness, or any other complaints. Patient states this is not her typical headache.

## 2021-07-02 NOTE — ED PROVIDER NOTE - PROGRESS NOTE DETAILS
Pt feels better with Toradol.  W/u unremarkable.  Advised strict return precautions and PMD f/u.  Will arrange ambulette.  Discussed with Dara, Pt's daughter-in-law.

## 2021-07-03 VITALS
DIASTOLIC BLOOD PRESSURE: 64 MMHG | OXYGEN SATURATION: 97 % | HEART RATE: 68 BPM | RESPIRATION RATE: 17 BRPM | TEMPERATURE: 98 F | SYSTOLIC BLOOD PRESSURE: 133 MMHG

## 2022-01-15 NOTE — ED PROVIDER NOTE - ENMT, MLM
Pt. demonstrates ability to turn self in bed without assistance of staff. Patient and family understands importance in prevention of skin breakdown, ulcers, and potential infection. Hourly rounding in effect. RN skin check complete.   Devices in place include:  x2 PIV; monitoring devices.  Skin assessed under devices: Yes.  Confirmed HAPI identified on the following date: n/a   Location of HAPI: none.  Wound Care RN following: No.  The following interventions are in place: PIV assessment q4; rotate monitoring devices q shift.    Old LLE IO access site, CDI.     Airway patent, Nasal mucosa clear. Mouth with normal mucosa. Throat has no vesicles, no oropharyngeal exudates and uvula is midline.

## 2022-05-16 NOTE — ED PROVIDER NOTE - NSTIMEPROVIDERCAREINITIATE_GEN_ER
Preceptor attestation:  Vital signs reviewed: BP (!) 134/95 (BP Location: Left arm, Patient Position: Sitting, Cuff Size: Adult Regular)   Pulse 70   Temp 98  F (36.7  C) (Oral)   Resp 20   Wt 95.6 kg (210 lb 12.8 oz)   LMP 07/15/2021   SpO2 99%   BMI 32.72 kg/m      Patient seen, evaluated, and discussed with the resident.  I have verified the content of the note, which accurately reflects my assessment of the patient and the plan of care.    Supervising physician: Chichi Victoria MD  [facility]   02-Jul-2021 15:11

## 2022-05-19 NOTE — ED ADULT NURSE NOTE - CARDIO ASSESSMENT
--- [FreeTextEntry1] : - Conservative management for now\par - Start Amoxicillin if symptoms do not improve

## 2022-09-13 NOTE — ED ADULT NURSE NOTE - CCCP TRG CHIEF CMPLNT
Pt wants to know does Dr. Foley Dear think she should get another booster shot. Pt stated she already has 2. Please advise.  Thank you!!! pain, head

## 2023-03-20 NOTE — ED ADULT NURSE NOTE - NSFALLRSKASSESASSIST_ED_ALL_ED
[Fever] : no fever [Chills] : no chills [Sore Throat] : sore throat [Shortness Of Breath] : no shortness of breath [Wheezing] : wheezing [Cough] : cough [Negative] : Heme/Lymph [FreeTextEntry4] : stuffy nose yes

## 2023-05-18 ENCOUNTER — INPATIENT (INPATIENT)
Facility: HOSPITAL | Age: 83
LOS: 5 days | Discharge: HOME | DRG: 551 | End: 2023-05-24
Attending: STUDENT IN AN ORGANIZED HEALTH CARE EDUCATION/TRAINING PROGRAM | Admitting: STUDENT IN AN ORGANIZED HEALTH CARE EDUCATION/TRAINING PROGRAM
Payer: MEDICARE

## 2023-05-18 VITALS
OXYGEN SATURATION: 96 % | RESPIRATION RATE: 18 BRPM | WEIGHT: 104.94 LBS | SYSTOLIC BLOOD PRESSURE: 130 MMHG | DIASTOLIC BLOOD PRESSURE: 63 MMHG | HEIGHT: 61 IN | TEMPERATURE: 98 F | HEART RATE: 116 BPM

## 2023-05-18 PROCEDURE — 99285 EMERGENCY DEPT VISIT HI MDM: CPT

## 2023-05-18 RX ORDER — SODIUM CHLORIDE 9 MG/ML
1000 INJECTION INTRAMUSCULAR; INTRAVENOUS; SUBCUTANEOUS ONCE
Refills: 0 | Status: COMPLETED | OUTPATIENT
Start: 2023-05-18 | End: 2023-05-18

## 2023-05-18 RX ORDER — ONDANSETRON 8 MG/1
4 TABLET, FILM COATED ORAL ONCE
Refills: 0 | Status: COMPLETED | OUTPATIENT
Start: 2023-05-18 | End: 2023-05-18

## 2023-05-18 RX ORDER — ACETAMINOPHEN 500 MG
725 TABLET ORAL ONCE
Refills: 0 | Status: COMPLETED | OUTPATIENT
Start: 2023-05-18 | End: 2023-05-19

## 2023-05-18 RX ORDER — SODIUM CHLORIDE 9 MG/ML
3 INJECTION INTRAMUSCULAR; INTRAVENOUS; SUBCUTANEOUS EVERY 8 HOURS
Refills: 0 | Status: DISCONTINUED | OUTPATIENT
Start: 2023-05-18 | End: 2023-05-24

## 2023-05-18 NOTE — ED ADULT TRIAGE NOTE - CHIEF COMPLAINT QUOTE
Pt BIBA s/p fall as per EMS pt was found on the fall next to her bed,  as per EMS pt does not remember what happen EMS received pt awake calling for help c/o stomach pain , left shoulders and both legs, vomiting.

## 2023-05-19 DIAGNOSIS — Z29.9 ENCOUNTER FOR PROPHYLACTIC MEASURES, UNSPECIFIED: ICD-10-CM

## 2023-05-19 DIAGNOSIS — N39.0 URINARY TRACT INFECTION, SITE NOT SPECIFIED: ICD-10-CM
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
## 2023-05-19 LAB
ALBUMIN SERPL ELPH-MCNC: 3.1 G/DL — LOW (ref 3.5–5)
ALBUMIN SERPL ELPH-MCNC: 3.2 G/DL — LOW (ref 3.5–5)
ALP SERPL-CCNC: 67 U/L — SIGNIFICANT CHANGE UP (ref 40–120)
ALP SERPL-CCNC: 68 U/L — SIGNIFICANT CHANGE UP (ref 40–120)
ALT FLD-CCNC: 13 U/L DA — SIGNIFICANT CHANGE UP (ref 10–60)
ALT FLD-CCNC: 14 U/L DA — SIGNIFICANT CHANGE UP (ref 10–60)
ANION GAP SERPL CALC-SCNC: 4 MMOL/L — LOW (ref 5–17)
ANION GAP SERPL CALC-SCNC: 5 MMOL/L — SIGNIFICANT CHANGE UP (ref 5–17)
APPEARANCE UR: CLEAR — SIGNIFICANT CHANGE UP
APTT BLD: 25.9 SEC — LOW (ref 27.5–35.5)
AST SERPL-CCNC: 16 U/L — SIGNIFICANT CHANGE UP (ref 10–40)
AST SERPL-CCNC: 16 U/L — SIGNIFICANT CHANGE UP (ref 10–40)
BACTERIA # UR AUTO: ABNORMAL /HPF
BASOPHILS # BLD AUTO: 0.03 K/UL — SIGNIFICANT CHANGE UP (ref 0–0.2)
BASOPHILS NFR BLD AUTO: 0.3 % — SIGNIFICANT CHANGE UP (ref 0–2)
BILIRUB SERPL-MCNC: 0.2 MG/DL — SIGNIFICANT CHANGE UP (ref 0.2–1.2)
BILIRUB SERPL-MCNC: 0.3 MG/DL — SIGNIFICANT CHANGE UP (ref 0.2–1.2)
BILIRUB UR-MCNC: NEGATIVE — SIGNIFICANT CHANGE UP
BUN SERPL-MCNC: 10 MG/DL — SIGNIFICANT CHANGE UP (ref 7–18)
BUN SERPL-MCNC: 16 MG/DL — SIGNIFICANT CHANGE UP (ref 7–18)
CALCIUM SERPL-MCNC: 9 MG/DL — SIGNIFICANT CHANGE UP (ref 8.4–10.5)
CALCIUM SERPL-MCNC: 9.1 MG/DL — SIGNIFICANT CHANGE UP (ref 8.4–10.5)
CHLORIDE SERPL-SCNC: 112 MMOL/L — HIGH (ref 96–108)
CHLORIDE SERPL-SCNC: 113 MMOL/L — HIGH (ref 96–108)
CK SERPL-CCNC: 61 U/L — SIGNIFICANT CHANGE UP (ref 21–215)
CO2 SERPL-SCNC: 25 MMOL/L — SIGNIFICANT CHANGE UP (ref 22–31)
CO2 SERPL-SCNC: 25 MMOL/L — SIGNIFICANT CHANGE UP (ref 22–31)
COLOR SPEC: YELLOW — SIGNIFICANT CHANGE UP
CREAT SERPL-MCNC: 0.45 MG/DL — LOW (ref 0.5–1.3)
CREAT SERPL-MCNC: 0.58 MG/DL — SIGNIFICANT CHANGE UP (ref 0.5–1.3)
DIFF PNL FLD: ABNORMAL
EGFR: 90 ML/MIN/1.73M2 — SIGNIFICANT CHANGE UP
EGFR: 95 ML/MIN/1.73M2 — SIGNIFICANT CHANGE UP
EOSINOPHIL # BLD AUTO: 0.03 K/UL — SIGNIFICANT CHANGE UP (ref 0–0.5)
EOSINOPHIL NFR BLD AUTO: 0.3 % — SIGNIFICANT CHANGE UP (ref 0–6)
EPI CELLS # UR: SIGNIFICANT CHANGE UP /HPF
FLUAV AG NPH QL: SIGNIFICANT CHANGE UP
FLUBV AG NPH QL: SIGNIFICANT CHANGE UP
GLUCOSE BLDC GLUCOMTR-MCNC: 147 MG/DL — HIGH (ref 70–99)
GLUCOSE SERPL-MCNC: 125 MG/DL — HIGH (ref 70–99)
GLUCOSE SERPL-MCNC: 140 MG/DL — HIGH (ref 70–99)
GLUCOSE UR QL: NEGATIVE — SIGNIFICANT CHANGE UP
HCT VFR BLD CALC: 36 % — SIGNIFICANT CHANGE UP (ref 34.5–45)
HCT VFR BLD CALC: 36.6 % — SIGNIFICANT CHANGE UP (ref 34.5–45)
HGB BLD-MCNC: 11.2 G/DL — LOW (ref 11.5–15.5)
HGB BLD-MCNC: 11.5 G/DL — SIGNIFICANT CHANGE UP (ref 11.5–15.5)
HYALINE CASTS # UR AUTO: ABNORMAL /LPF
IMM GRANULOCYTES NFR BLD AUTO: 0.4 % — SIGNIFICANT CHANGE UP (ref 0–0.9)
INR BLD: 1.06 RATIO — SIGNIFICANT CHANGE UP (ref 0.88–1.16)
KETONES UR-MCNC: ABNORMAL
LEUKOCYTE ESTERASE UR-ACNC: ABNORMAL
LIDOCAIN IGE QN: 119 U/L — SIGNIFICANT CHANGE UP (ref 73–393)
LYMPHOCYTES # BLD AUTO: 1.2 K/UL — SIGNIFICANT CHANGE UP (ref 1–3.3)
LYMPHOCYTES # BLD AUTO: 11.8 % — LOW (ref 13–44)
MAGNESIUM SERPL-MCNC: 2.1 MG/DL — SIGNIFICANT CHANGE UP (ref 1.6–2.6)
MCHC RBC-ENTMCNC: 26.5 PG — LOW (ref 27–34)
MCHC RBC-ENTMCNC: 27 PG — SIGNIFICANT CHANGE UP (ref 27–34)
MCHC RBC-ENTMCNC: 31.1 GM/DL — LOW (ref 32–36)
MCHC RBC-ENTMCNC: 31.4 GM/DL — LOW (ref 32–36)
MCV RBC AUTO: 85.3 FL — SIGNIFICANT CHANGE UP (ref 80–100)
MCV RBC AUTO: 85.9 FL — SIGNIFICANT CHANGE UP (ref 80–100)
MONOCYTES # BLD AUTO: 0.78 K/UL — SIGNIFICANT CHANGE UP (ref 0–0.9)
MONOCYTES NFR BLD AUTO: 7.7 % — SIGNIFICANT CHANGE UP (ref 2–14)
NEUTROPHILS # BLD AUTO: 8.06 K/UL — HIGH (ref 1.8–7.4)
NEUTROPHILS NFR BLD AUTO: 79.5 % — HIGH (ref 43–77)
NITRITE UR-MCNC: NEGATIVE — SIGNIFICANT CHANGE UP
NRBC # BLD: 0 /100 WBCS — SIGNIFICANT CHANGE UP (ref 0–0)
NRBC # BLD: 0 /100 WBCS — SIGNIFICANT CHANGE UP (ref 0–0)
PH UR: 6.5 — SIGNIFICANT CHANGE UP (ref 5–8)
PHOSPHATE SERPL-MCNC: 2.3 MG/DL — LOW (ref 2.5–4.5)
PLATELET # BLD AUTO: 261 K/UL — SIGNIFICANT CHANGE UP (ref 150–400)
PLATELET # BLD AUTO: 262 K/UL — SIGNIFICANT CHANGE UP (ref 150–400)
POTASSIUM SERPL-MCNC: 3.3 MMOL/L — LOW (ref 3.5–5.3)
POTASSIUM SERPL-MCNC: 3.5 MMOL/L — SIGNIFICANT CHANGE UP (ref 3.5–5.3)
POTASSIUM SERPL-SCNC: 3.3 MMOL/L — LOW (ref 3.5–5.3)
POTASSIUM SERPL-SCNC: 3.5 MMOL/L — SIGNIFICANT CHANGE UP (ref 3.5–5.3)
PROT SERPL-MCNC: 7.2 G/DL — SIGNIFICANT CHANGE UP (ref 6–8.3)
PROT SERPL-MCNC: 7.3 G/DL — SIGNIFICANT CHANGE UP (ref 6–8.3)
PROT UR-MCNC: 30 MG/DL
PROTHROM AB SERPL-ACNC: 12.6 SEC — SIGNIFICANT CHANGE UP (ref 10.5–13.4)
RBC # BLD: 4.22 M/UL — SIGNIFICANT CHANGE UP (ref 3.8–5.2)
RBC # BLD: 4.26 M/UL — SIGNIFICANT CHANGE UP (ref 3.8–5.2)
RBC # FLD: 14.6 % — HIGH (ref 10.3–14.5)
RBC # FLD: 14.9 % — HIGH (ref 10.3–14.5)
RBC CASTS # UR COMP ASSIST: ABNORMAL /HPF (ref 0–2)
SARS-COV-2 RNA SPEC QL NAA+PROBE: SIGNIFICANT CHANGE UP
SODIUM SERPL-SCNC: 142 MMOL/L — SIGNIFICANT CHANGE UP (ref 135–145)
SODIUM SERPL-SCNC: 142 MMOL/L — SIGNIFICANT CHANGE UP (ref 135–145)
SP GR SPEC: 1 — LOW (ref 1.01–1.02)
TROPONIN I, HIGH SENSITIVITY RESULT: 6.3 NG/L — SIGNIFICANT CHANGE UP
UROBILINOGEN FLD QL: NEGATIVE — SIGNIFICANT CHANGE UP
WBC # BLD: 10.14 K/UL — SIGNIFICANT CHANGE UP (ref 3.8–10.5)
WBC # BLD: 5.73 K/UL — SIGNIFICANT CHANGE UP (ref 3.8–10.5)
WBC # FLD AUTO: 10.14 K/UL — SIGNIFICANT CHANGE UP (ref 3.8–10.5)
WBC # FLD AUTO: 5.73 K/UL — SIGNIFICANT CHANGE UP (ref 3.8–10.5)
WBC UR QL: ABNORMAL /HPF (ref 0–5)

## 2023-05-19 PROCEDURE — 74177 CT ABD & PELVIS W/CONTRAST: CPT | Mod: 26,MA

## 2023-05-19 PROCEDURE — 99223 1ST HOSP IP/OBS HIGH 75: CPT | Mod: GC

## 2023-05-19 PROCEDURE — 70450 CT HEAD/BRAIN W/O DYE: CPT | Mod: 26,MA

## 2023-05-19 PROCEDURE — 72125 CT NECK SPINE W/O DYE: CPT | Mod: 26,MA

## 2023-05-19 PROCEDURE — 93010 ELECTROCARDIOGRAM REPORT: CPT

## 2023-05-19 PROCEDURE — 71260 CT THORAX DX C+: CPT | Mod: 26,MA

## 2023-05-19 RX ORDER — CEFTRIAXONE 500 MG/1
1000 INJECTION, POWDER, FOR SOLUTION INTRAMUSCULAR; INTRAVENOUS EVERY 24 HOURS
Refills: 0 | Status: COMPLETED | OUTPATIENT
Start: 2023-05-19 | End: 2023-05-22

## 2023-05-19 RX ORDER — SODIUM CHLORIDE 9 MG/ML
1000 INJECTION INTRAMUSCULAR; INTRAVENOUS; SUBCUTANEOUS
Refills: 0 | Status: DISCONTINUED | OUTPATIENT
Start: 2023-05-19 | End: 2023-05-19

## 2023-05-19 RX ORDER — DEXTROSE MONOHYDRATE, SODIUM CHLORIDE, AND POTASSIUM CHLORIDE 50; .745; 4.5 G/1000ML; G/1000ML; G/1000ML
1000 INJECTION, SOLUTION INTRAVENOUS
Refills: 0 | Status: DISCONTINUED | OUTPATIENT
Start: 2023-05-19 | End: 2023-05-22

## 2023-05-19 RX ORDER — ACETAMINOPHEN 500 MG
650 TABLET ORAL EVERY 6 HOURS
Refills: 0 | Status: DISCONTINUED | OUTPATIENT
Start: 2023-05-19 | End: 2023-05-23

## 2023-05-19 RX ORDER — CEFTRIAXONE 500 MG/1
1000 INJECTION, POWDER, FOR SOLUTION INTRAMUSCULAR; INTRAVENOUS ONCE
Refills: 0 | Status: COMPLETED | OUTPATIENT
Start: 2023-05-19 | End: 2023-05-19

## 2023-05-19 RX ORDER — ENOXAPARIN SODIUM 100 MG/ML
40 INJECTION SUBCUTANEOUS EVERY 24 HOURS
Refills: 0 | Status: DISCONTINUED | OUTPATIENT
Start: 2023-05-19 | End: 2023-05-24

## 2023-05-19 RX ADMIN — Medication 725 MILLIGRAM(S): at 01:56

## 2023-05-19 RX ADMIN — SODIUM CHLORIDE 1000 MILLILITER(S): 9 INJECTION INTRAMUSCULAR; INTRAVENOUS; SUBCUTANEOUS at 01:14

## 2023-05-19 RX ADMIN — Medication 290 MILLIGRAM(S): at 01:38

## 2023-05-19 RX ADMIN — Medication 725 MILLIGRAM(S): at 01:53

## 2023-05-19 RX ADMIN — ONDANSETRON 4 MILLIGRAM(S): 8 TABLET, FILM COATED ORAL at 01:14

## 2023-05-19 RX ADMIN — DEXTROSE MONOHYDRATE, SODIUM CHLORIDE, AND POTASSIUM CHLORIDE 100 MILLILITER(S): 50; .745; 4.5 INJECTION, SOLUTION INTRAVENOUS at 13:58

## 2023-05-19 RX ADMIN — SODIUM CHLORIDE 3 MILLILITER(S): 9 INJECTION INTRAMUSCULAR; INTRAVENOUS; SUBCUTANEOUS at 22:32

## 2023-05-19 RX ADMIN — CEFTRIAXONE 100 MILLIGRAM(S): 500 INJECTION, POWDER, FOR SOLUTION INTRAMUSCULAR; INTRAVENOUS at 05:11

## 2023-05-19 RX ADMIN — SODIUM CHLORIDE 1000 MILLILITER(S): 9 INJECTION INTRAMUSCULAR; INTRAVENOUS; SUBCUTANEOUS at 01:57

## 2023-05-19 RX ADMIN — SODIUM CHLORIDE 3 MILLILITER(S): 9 INJECTION INTRAMUSCULAR; INTRAVENOUS; SUBCUTANEOUS at 13:38

## 2023-05-19 RX ADMIN — SODIUM CHLORIDE 3 MILLILITER(S): 9 INJECTION INTRAMUSCULAR; INTRAVENOUS; SUBCUTANEOUS at 07:22

## 2023-05-19 NOTE — ED PROVIDER NOTE - CLINICAL SUMMARY MEDICAL DECISION MAKING FREE TEXT BOX
Pt p/w ABD pain and vomiting with a possible fall at home though pt doesn't recollect today's events. Reassuring exam though pt vomiting in the ED.    Labs unremarkable. CT showing no acute path. UA showing UTI. On reassessment pt remains confused though stable and conversant. Giving cef and endorsed to inpatient team.

## 2023-05-19 NOTE — PATIENT PROFILE ADULT - FALL HARM RISK - HARM RISK INTERVENTIONS

## 2023-05-19 NOTE — ED ADULT NURSE NOTE - TEMPLATE LIST FOR HEAD TO TOE ASSESSMENT
Fall
Lives at home by herself. Performs all ADLs independently. Son helps her with grocery shopping.  Denies smoking, drinking, drug use.

## 2023-05-19 NOTE — ED ADULT NURSE NOTE - NSFALLRISKINTERV_ED_ALL_ED
Assistance OOB with selected safe patient handling equipment if applicable/Communicate fall risk and risk factors to all staff, patient, and family/Orthostatic vital signs/Provide visual cue: yellow wristband, yellow gown, etc/Reinforce activity limits and safety measures with patient and family/Call bell, personal items and telephone in reach/Instruct patient to call for assistance before getting out of bed/chair/stretcher/Non-slip footwear applied when patient is off stretcher/[place] to call system/Physically safe environment - no spills, clutter or unnecessary equipment/Purposeful Proactive Rounding/Room/bathroom lighting operational, light cord in reach

## 2023-05-19 NOTE — H&P ADULT - ASSESSMENT
82 year old female from home presenting with son to ED after a fall. Patient does not remember fall admitted for UTI.

## 2023-05-19 NOTE — ED ADULT NURSE REASSESSMENT NOTE - NSFALLHARMRISKINTERV_ED_ALL_ED
Assistance OOB with selected safe patient handling equipment if applicable/Assistance with ambulation/Communicate risk of Fall with Harm to all staff, patient, and family/Monitor gait and stability/Provide visual cue: red socks, yellow wristband, yellow gown, etc/Reinforce activity limits and safety measures with patient and family/Toileting schedule using arm’s reach rule for commode and bathroom/Bed in lowest position, wheels locked, appropriate side rails in place/Call bell, personal items and telephone in reach/Instruct patient to call for assistance before getting out of bed/chair/stretcher/Non-slip footwear applied when patient is off stretcher/[place] to call system/Physically safe environment - no spills, clutter or unnecessary equipment/Purposeful Proactive Rounding/Room/bathroom lighting operational, light cord in reach

## 2023-05-19 NOTE — H&P ADULT - ATTENDING COMMENTS
82F w/ pmhx of thyroid disease, multiple UTIs p/w fall/ found down after recently returning home from rehab. Son states pt usually more oriented. Concern for UTI contributing to current fall and acute encephalopathy  -Cont. IV Ceftriaxone  -F/u UCx  -Falls and aspiration precautions  -EKG  -PT consult, Likely dispo to [facility] if able  -Orthostatics  -Need med rec, Son and daughter in law don't have list.  -DVT PPx, Lovenox

## 2023-05-19 NOTE — H&P ADULT - NSHPPHYSICALEXAM_GEN_ALL_CORE
Vital Signs Last 24 Hrs  T(C): 36.6 (05-19-23 @ 00:22), Max: 36.6 (05-19-23 @ 00:22)  T(F): 97.8 (05-19-23 @ 00:22), Max: 97.8 (05-19-23 @ 00:22)  HR: 70 (05-19-23 @ 00:22) (70 - 116)  BP: 114/71 (05-19-23 @ 00:22) (114/71 - 130/63)  BP(mean): --  RR: 17 (05-19-23 @ 00:22) (17 - 18)  SpO2: 99% (05-19-23 @ 00:22) (96% - 99%)

## 2023-05-19 NOTE — H&P ADULT - HISTORY OF PRESENT ILLNESS
82 year old female from home presenting with son to ED after a fall. Patient does not remember fall but states that she was brought to the hospital by her son. Per ED sign out patient was found out of her bed on the ground. Patient denies any current symptoms including fevers, chills, headaches, dizziness, chest pain, cough, SOB, abd pain, n/v, diarrhea, constipation hematuria, dysuria, sick contacts, recent travel.    In ED VS: T 97.5, , /63, RR 18, spo2 96%RA  UA +  ct head spine abd and pelvis negative  s/p 1 dose Rocephin and 1LNS  82 year old female from home w/ hx of thyroid disease presenting with son to ED after a fall. Patient does not remember fall but states that she was brought to the hospital by her son. Per ED sign out patient was found out of her bed on the ground. Patient denies any current symptoms including fevers, chills, headaches, dizziness, chest pain, cough, SOB, abd pain, n/v, diarrhea, constipation hematuria, dysuria, sick contacts, recent travel.    As per discussion with son and daughter in law, pt had multiple UTIs, recently in rehab for a month and got home 4 days ago. Has HHA until 9pm but then alone. Trouble walking recently and needs walker. Seems like pt fell and hit her help button with police coming to house and breaking door down. Son states pt usually is decently oriented. No recently complaints to family.     In ED VS: T 97.5, , /63, RR 18, spo2 96%RA  UA +  ct head spine abd and pelvis negative  s/p 1 dose Rocephin and 1LNS

## 2023-05-19 NOTE — H&P ADULT - TIME BILLING
Need to interview and examine patient, discuss care with family, provide counseling, discuss care with provider team and ER physician and review prior medical records

## 2023-05-19 NOTE — ED PROVIDER NOTE - PHYSICAL EXAMINATION
Vital Signs Reviewed  GEN: Comfortable, Speaking clearly in full sentences, NAD, Oriented to person and place, Not month or year  HEENT: NCAT, No cspine TTP, MMM, Neck Supple  RESP: CTAB, No rales/rhonchi/wheezing  CV: RRR, S1S2, No murmurs  ABD: No TTP, Soft, ND, No masses, No CVA Tenderness  Extrem/Skin: No signs of trauma, Equal pulses bilat, No cyanosis/edema/rashes  Neuro: No focal deficits

## 2023-05-19 NOTE — ED PROVIDER NOTE - OBJECTIVE STATEMENT
82-year-old female without clear medical history and no family available in the ER presenting with abdominal pain and vomiting.  Triage initially spoke to the son who states that she fell from her bed today.  Patient confused and unable to contribute more to history or HPI.

## 2023-05-20 LAB
ANION GAP SERPL CALC-SCNC: 4 MMOL/L — LOW (ref 5–17)
BUN SERPL-MCNC: 10 MG/DL — SIGNIFICANT CHANGE UP (ref 7–18)
CALCIUM SERPL-MCNC: 8.9 MG/DL — SIGNIFICANT CHANGE UP (ref 8.4–10.5)
CHLORIDE SERPL-SCNC: 114 MMOL/L — HIGH (ref 96–108)
CO2 SERPL-SCNC: 24 MMOL/L — SIGNIFICANT CHANGE UP (ref 22–31)
CREAT SERPL-MCNC: 0.35 MG/DL — LOW (ref 0.5–1.3)
EGFR: 101 ML/MIN/1.73M2 — SIGNIFICANT CHANGE UP
GLUCOSE SERPL-MCNC: 99 MG/DL — SIGNIFICANT CHANGE UP (ref 70–99)
HCT VFR BLD CALC: 34.1 % — LOW (ref 34.5–45)
HGB BLD-MCNC: 10.6 G/DL — LOW (ref 11.5–15.5)
MCHC RBC-ENTMCNC: 26.2 PG — LOW (ref 27–34)
MCHC RBC-ENTMCNC: 31.1 GM/DL — LOW (ref 32–36)
MCV RBC AUTO: 84.4 FL — SIGNIFICANT CHANGE UP (ref 80–100)
MRSA PCR RESULT.: SIGNIFICANT CHANGE UP
NRBC # BLD: 0 /100 WBCS — SIGNIFICANT CHANGE UP (ref 0–0)
PLATELET # BLD AUTO: 231 K/UL — SIGNIFICANT CHANGE UP (ref 150–400)
POTASSIUM SERPL-MCNC: 3.5 MMOL/L — SIGNIFICANT CHANGE UP (ref 3.5–5.3)
POTASSIUM SERPL-SCNC: 3.5 MMOL/L — SIGNIFICANT CHANGE UP (ref 3.5–5.3)
RBC # BLD: 4.04 M/UL — SIGNIFICANT CHANGE UP (ref 3.8–5.2)
RBC # FLD: 15.1 % — HIGH (ref 10.3–14.5)
S AUREUS DNA NOSE QL NAA+PROBE: SIGNIFICANT CHANGE UP
SODIUM SERPL-SCNC: 142 MMOL/L — SIGNIFICANT CHANGE UP (ref 135–145)
TSH SERPL-MCNC: 0.99 UU/ML — SIGNIFICANT CHANGE UP (ref 0.34–4.82)
WBC # BLD: 5.16 K/UL — SIGNIFICANT CHANGE UP (ref 3.8–10.5)
WBC # FLD AUTO: 5.16 K/UL — SIGNIFICANT CHANGE UP (ref 3.8–10.5)

## 2023-05-20 PROCEDURE — 99233 SBSQ HOSP IP/OBS HIGH 50: CPT

## 2023-05-20 RX ADMIN — SODIUM CHLORIDE 3 MILLILITER(S): 9 INJECTION INTRAMUSCULAR; INTRAVENOUS; SUBCUTANEOUS at 05:36

## 2023-05-20 RX ADMIN — ENOXAPARIN SODIUM 40 MILLIGRAM(S): 100 INJECTION SUBCUTANEOUS at 05:38

## 2023-05-20 RX ADMIN — SODIUM CHLORIDE 3 MILLILITER(S): 9 INJECTION INTRAMUSCULAR; INTRAVENOUS; SUBCUTANEOUS at 14:18

## 2023-05-20 RX ADMIN — CEFTRIAXONE 100 MILLIGRAM(S): 500 INJECTION, POWDER, FOR SOLUTION INTRAMUSCULAR; INTRAVENOUS at 05:38

## 2023-05-20 RX ADMIN — SODIUM CHLORIDE 3 MILLILITER(S): 9 INJECTION INTRAMUSCULAR; INTRAVENOUS; SUBCUTANEOUS at 22:07

## 2023-05-20 NOTE — CHART NOTE - NSCHARTNOTEFT_GEN_A_CORE
attempted multiple times to contact patient's pharmacy [id number] on file tel [phone]  also Emergency contact son Hiro tel [phone]   to review patient home meds   no answer  to f/u for home medications called patient's pharmacy [id number] on file tel [phone]  patient has no medications since 2021 (antibiotics prescribed from [facility])   spoke also to Emergency contact son Hiro tel [phone] to review patient's0 home meds   he said patient is not taking any medications except occasional Tylenol

## 2023-05-20 NOTE — PHYSICAL THERAPY INITIAL EVALUATION ADULT - CRITERIA FOR SKILLED THERAPEUTIC INTERVENTIONS
[facility]/impairments found/functional limitations in following categories/anticipated discharge recommendation

## 2023-05-20 NOTE — PHYSICAL THERAPY INITIAL EVALUATION ADULT - LEVEL OF INDEPENDENCE: CHAIR TO BED, REHAB EVAL
TBA- pt was resistant to mobilization activities due to fear of falling/insisted that she is unable to perform activity-kept eyes closed when pt refused to participate

## 2023-05-20 NOTE — PHYSICAL THERAPY INITIAL EVALUATION ADULT - PLANNED THERAPY INTERVENTIONS, PT EVAL
stretcher balance training/bed mobility training/gait training/neuromuscular re-education/strengthening/transfer training

## 2023-05-20 NOTE — PROGRESS NOTE ADULT - SUBJECTIVE AND OBJECTIVE BOX
MEDICAL ATTENDING NOTE        INTERVAL HPI/OVERNIGHT EVENTS: no new complaints    MEDICATIONS  (STANDING):  cefTRIAXone   IVPB 1000 milliGRAM(s) IV Intermittent every 24 hours  enoxaparin Injectable 40 milliGRAM(s) SubCutaneous every 24 hours  sodium chloride 0.9% lock flush 3 milliLiter(s) IV Push every 8 hours  sodium chloride 0.9% with potassium chloride 20 mEq/L 1000 milliLiter(s) (100 mL/Hr) IV Continuous <Continuous>    MEDICATIONS  (PRN):  acetaminophen     Tablet .. 650 milliGRAM(s) Oral every 6 hours PRN Temp greater or equal to 38C (100.4F), Mild Pain (1 - 3)      __________________________________________________  REVIEW OF SYSTEMS:    CONSTITUTIONAL: No fever,   EYES: no acute visual disturbances  NECK: No pain or stiffness  RESPIRATORY: No cough; No shortness of breath  CARDIOVASCULAR: No chest pain, no palpitations  GASTROINTESTINAL: No pain. No nausea or vomiting; No diarrhea   NEUROLOGICAL: No headache or numbness, no tremors  MUSCULOSKELETAL: No joint pain, no muscle pain  GENITOURINARY: no dysuria, no frequency, no hesitancy  PSYCHIATRY: no depression , no anxiety  ALL OTHER  ROS negative        Vital Signs Last 24 Hrs  T(C): 36.4 (20 May 2023 14:30), Max: 37.4 (19 May 2023 19:09)  T(F): 97.6 (20 May 2023 14:30), Max: 99.4 (19 May 2023 19:09)  HR: 65 (20 May 2023 14:30) (65 - 90)  BP: 133/55 (20 May 2023 14:30) (125/63 - 133/55)  BP(mean): 88 (19 May 2023 15:45) (88 - 88)  RR: 17 (20 May 2023 14:30) (17 - 17)  SpO2: 98% (20 May 2023 14:30) (98% - 100%)    Parameters below as of 20 May 2023 14:30  Patient On (Oxygen Delivery Method): room air        ________________________________________________  PHYSICAL EXAM:  GENERAL: NAD  HEENT: Normocephalic;  conjunctivae and sclerae clear; moist mucous membranes;   NECK : supple  CHEST/LUNG: Clear to auscultation bilaterally with good air entry   HEART: S1 S2  regular; no murmurs, gallops or rubs  ABDOMEN: Soft, Nontender, Nondistended; Bowel sounds present  EXTREMITIES: no cyanosis; no edema; no calf tenderness  SKIN: warm and dry; no rash  NERVOUS SYSTEM:  Awake and alert; Oriented  to place, person and time ; no new deficits    _________________________________________________  LABS:                        10.6   5.16  )-----------( 231      ( 20 May 2023 05:34 )             34.1     05-  142  |  114<H>  |  10  ----------------------------<  99  3.5   |  24  |  0.35<L>    Ca    8.9      20 May 2023 05:34  Phos  2.3     -  Mg     2.1       TPro  7.2  /  Alb  3.1<L>  /  TBili  0.2  /  DBili  x   /  AST  16  /  ALT  14  /  AlkPhos  67  -    PT/INR - ( 19 May 2023 01:02 )   PT: 12.6 sec;   INR: 1.06 ratio     PTT - ( 19 May 2023 01:02 )  PTT:25.9 sec    Urinalysis Basic - ( 19 May 2023 02:50 )  Color: Yellow / Appearance: Clear / S.005 / pH: x  Gluc: x / Ketone: Trace  / Bili: Negative / Urobili: Negative   Blood: x / Protein: 30 mg/dL / Nitrite: Negative   Leuk Esterase: Moderate / RBC: 5-10 /HPF / WBC 11-25 /HPF   Sq Epi: x / Non Sq Epi: x / Bacteria: Many /HPF      POCT Blood Glucose.: 147 mg/dL (19 May 2023 21:42)        RADIOLOGY & ADDITIONAL TESTS:    Imaging Personally Reviewed:  YES/NO    Consultant(s) Notes Reviewed:   YES/ No    Care Discussed with Consultants :     Plan of care was discussed with patient and /or primary care giver; all questions and concerns were addressed and care was aligned with patient's wishes.     MEDICAL ATTENDING NOTE    82 year old female from home w/ hx of thyroid disease presenting with son to ED after a fall. Patient does not remember fall but states that she was brought to the hospital by her son. Per ED sign out patient was found out of her bed on the ground. Patient with Hx of multiple UTIs, recently in rehab for a month and got home 4 days ago. Has HHA until 9 pm but then alone. Trouble walking recently and needs walker. Seems like pt fell and hit her help button with police coming to house and breaking door down.    INTERVAL HPI/OVERNIGHT EVENTS: Patient is doing well; very pleasant;  comfortable in bed; c/o pain in both knee joints and arms. ate well. denies fever, chills, SOB, palpitations, chest pain, nausea, vomiting, diarrhea, constipation    MEDICATIONS  (STANDING):  cefTRIAXone   IVPB 1000 milliGRAM(s) IV Intermittent every 24 hours  enoxaparin Injectable 40 milliGRAM(s) SubCutaneous every 24 hours  sodium chloride 0.9% lock flush 3 milliLiter(s) IV Push every 8 hours  sodium chloride 0.9% with potassium chloride 20 mEq/L 1000 milliLiter(s) (100 mL/Hr) IV Continuous <Continuous>    MEDICATIONS  (PRN):  acetaminophen     Tablet .. 650 milliGRAM(s) Oral every 6 hours PRN Temp greater or equal to 38C (100.4F), Mild Pain (1 - 3)      Vital Signs Last 24 Hrs  T(C): 36.4 (20 May 2023 14:30), Max: 37.4 (19 May 2023 19:09)  T(F): 97.6 (20 May 2023 14:30), Max: 99.4 (19 May 2023 19:09)  HR: 65 (20 May 2023 14:30) (65 - 90)  BP: 133/55 (20 May 2023 14:30) (125/63 - 133/55)  BP(mean): 88 (19 May 2023 15:45) (88 - 88)  RR: 17 (20 May 2023 14:30) (17 - 17)  SpO2: 98% (20 May 2023 14:30) (98% - 100%)  room air    ________________________________________________  PHYSICAL EXAM:  GENERAL: NAD  HEENT: Normocephalic;  conjunctivae and sclerae clear; moist mucous membranes;   NECK : supple  CHEST/LUNG: Clear to auscultation bilaterally with good air entry   HEART: S1 S2  regular; no murmurs, gallops or rubs  ABDOMEN: Soft, Nontender, Nondistended; Bowel sounds present  EXTREMITIES: no cyanosis; no edema; no calf tenderness  SKIN: warm and dry; no rash  NERVOUS SYSTEM:  Awake and alert; Oriented  to place, person and time ; no new deficits    _________________________________________________  LABS:                        10.6   5.16  )-----------( 231      ( 20 May 2023 05:34 )             34.1     05-20  142  |  114<H>  |  10  ----------------------------<  99  3.5   |  24  |  0.35<L>    Ca    8.9      20 May 2023 05:34  Phos  2.3       Mg     2.1       TPro  7.2  /  Alb  3.1<L>  /  TBili  0.2  /  DBili  x   /  AST  16  /  ALT  14  /  AlkPhos  67  -    PT/INR - ( 19 May 2023 01:02 )   PT: 12.6 sec;   INR: 1.06 ratio     PTT - ( 19 May 2023 01:02 )  PTT:25.9 sec    Urinalysis Basic - ( 19 May 2023 02:50 )  Color: Yellow / Appearance: Clear / S.005 / pH: x  Gluc: x / Ketone: Trace  / Bili: Negative / Urobili: Negative   Blood: x / Protein: 30 mg/dL / Nitrite: Negative   Leuk Esterase: Moderate / RBC: 5-10 /HPF / WBC 11-25 /HPF   Sq Epi: x / Non Sq Epi: x / Bacteria: Many /HPF      POCT Blood Glucose.: 147 mg/dL (19 May 2023 21:42)        RADIOLOGY & ADDITIONAL TESTS:    CT Abdomen and Pelvis w/ IV Cont (23 @ 02:28)     FINDINGS:  CHEST:  LUNGS AND LARGE AIRWAYS: Patent central airways. No pulmonary nodules.  Minimal bibasilar subsegmental atelectasis.  PLEURA: No pleural effusion.  VESSELS: Within normal limits.  HEART: Heart size is normal. No pericardial effusion.  MEDIASTINUM AND ADRIANO: No lymphadenopathy.  CHEST WALL AND LOWER NECK: Within normal limits.    ABDOMEN AND PELVIS:  LIVER: Within normal limits.  BILE DUCTS: Normal caliber.  GALLBLADDER: Within normal limits.  SPLEEN: Within normal limits.  PANCREAS: Within normal limits.  ADRENALS: Within normal limits.  KIDNEYS/URETERS: Within normal limits.    BLADDER: Moderately distended. Gas within the nondependent portion of urinary bladder.  REPRODUCTIVE ORGANS: Supracervical hysterectomy.    There is a moderate amount of stool throughout the colon.    No acute traumatic visceral injuries are seen.    BOWEL: No bowel obstruction. Appendix is not visualized. No evidence of inflammation in the pericecal region.  PERITONEUM: No ascites.  No free air or abscess.  VESSELS: Within normal limits.  RETROPERITONEUM/LYMPH NODES: No lymphadenopathy. No retroperitoneal hemorrhage.  ABDOMINAL WALL: Within normal limits.  BONES: Degenerative changes. No lytic or blastic process. Mild stable compression deformity L1 and L3. There is no evidence of new acute fracture or dislocation. No focal subcutaneous soft tissue hematoma.    IMPRESSION: No acute traumatic visceral injuries are seen.   Mild stable compression deformity L1 and L3. There is no evidence of new acute fracture or dislocation. No focal subcutaneous soft tissue hematoma.  Please refer to detailed findings otherwise described above.      Plan of care was discussed with patient and /or primary care giver; all questions and concerns were addressed and care was aligned with patient's wishes.     MEDICAL ATTENDING NOTE    82 year old female from home w/ hx of thyroid disease presenting with son to ED after a fall. Patient does not remember fall but states that she was brought to the hospital by her son. Per ED sign out patient was found out of her bed on the ground. Patient with Hx of multiple UTIs, recently in rehab for a month and got home 4 days ago. Has HHA until 9 pm but then alone. Trouble walking recently and needs walker. Seems like pt fell and hit her help button with police coming to house and breaking door down.    INTERVAL HPI/OVERNIGHT EVENTS: Patient is doing well; very pleasant;  comfortable in bed; c/o pain in both knee joints and arms. ate well. denies fever, chills, SOB, palpitations, chest pain, nausea, vomiting, diarrhea, constipation    MEDICATIONS  (STANDING):  cefTRIAXone   IVPB 1000 milliGRAM(s) IV Intermittent every 24 hours  enoxaparin Injectable 40 milliGRAM(s) SubCutaneous every 24 hours  sodium chloride 0.9% lock flush 3 milliLiter(s) IV Push every 8 hours  sodium chloride 0.9% with potassium chloride 20 mEq/L 1000 milliLiter(s) (100 mL/Hr) IV Continuous <Continuous>    MEDICATIONS  (PRN):  acetaminophen     Tablet .. 650 milliGRAM(s) Oral every 6 hours PRN Temp greater or equal to 38C (100.4F), Mild Pain (1 - 3)      Vital Signs Last 24 Hrs  T(C): 36.4 (20 May 2023 14:30), Max: 37.4 (19 May 2023 19:09)  T(F): 97.6 (20 May 2023 14:30), Max: 99.4 (19 May 2023 19:09)  HR: 65 (20 May 2023 14:30) (65 - 90)  BP: 133/55 (20 May 2023 14:30) (125/63 - 133/55)  BP(mean): 88 (19 May 2023 15:45) (88 - 88)  RR: 17 (20 May 2023 14:30) (17 - 17)  SpO2: 98% (20 May 2023 14:30) (98% - 100%)  room air    ________________________________________________  PHYSICAL EXAM:  GENERAL: NAD  HEENT: Normocephalic;  conjunctivae and sclerae clear; moist mucous membranes;   NECK : supple  CHEST/LUNG: Clear to auscultation bilaterally with good air entry   HEART: S1 S2  regular; no murmurs, gallops or rubs  ABDOMEN: Soft, Nontender, Nondistended; Bowel sounds present  EXTREMITIES: no cyanosis; no edema; no calf tenderness  SKIN: warm and dry; no rash  NERVOUS SYSTEM:  Awake and alert; Oriented  to place, person and time ; no new deficits    _________________________________________________  LABS:                        10.6   5.16  )-----------( 231      ( 20 May 2023 05:34 )             34.1     05-20  142  |  114<H>  |  10  ----------------------------<  99  3.5   |  24  |  0.35<L>    Ca    8.9      20 May 2023 05:34  Phos  2.3       Mg     2.1       TPro  7.2  /  Alb  3.1<L>  /  TBili  0.2  /  DBili  x   /  AST  16  /  ALT  14  /  AlkPhos  67  -    PT/INR - ( 19 May 2023 01:02 )   PT: 12.6 sec;   INR: 1.06 ratio     PTT - ( 19 May 2023 01:02 )  PTT:25.9 sec    Urinalysis Basic - ( 19 May 2023 02:50 )  Color: Yellow / Appearance: Clear / S.005 / pH: x  Gluc: x / Ketone: Trace  / Bili: Negative / Urobili: Negative   Blood: x / Protein: 30 mg/dL / Nitrite: Negative   Leuk Esterase: Moderate / RBC: 5-10 /HPF / WBC 11-25 /HPF   Sq Epi: x / Non Sq Epi: x / Bacteria: Many /HPF      POCT Blood Glucose.: 147 mg/dL (19 May 2023 21:42)        RADIOLOGY & ADDITIONAL TESTS:    CT Abdomen and Pelvis w/ IV Cont (23 @ 02:28)     FINDINGS:  CHEST:  LUNGS AND LARGE AIRWAYS: Patent central airways. No pulmonary nodules.  Minimal bibasilar subsegmental atelectasis.  PLEURA: No pleural effusion.  VESSELS: Within normal limits.  HEART: Heart size is normal. No pericardial effusion.  MEDIASTINUM AND ADRIANO: No lymphadenopathy.  CHEST WALL AND LOWER NECK: Within normal limits.    ABDOMEN AND PELVIS:  LIVER: Within normal limits.  BILE DUCTS: Normal caliber.  GALLBLADDER: Within normal limits.  SPLEEN: Within normal limits.  PANCREAS: Within normal limits.  ADRENALS: Within normal limits.  KIDNEYS/URETERS: Within normal limits.    BLADDER: Moderately distended. Gas within the nondependent portion of urinary bladder.  REPRODUCTIVE ORGANS: Supracervical hysterectomy.    There is a moderate amount of stool throughout the colon.  No acute traumatic visceral injuries are seen.    BOWEL: No bowel obstruction. Appendix is not visualized. No evidence of inflammation in the pericecal region.  PERITONEUM: No ascites.  No free air or abscess.  VESSELS: Within normal limits.  RETROPERITONEUM/LYMPH NODES: No lymphadenopathy. No retroperitoneal hemorrhage.  ABDOMINAL WALL: Within normal limits.  BONES: Degenerative changes. No lytic or blastic process. Mild stable compression deformity L1 and L3. There is no evidence of new acute fracture or dislocation. No focal subcutaneous soft tissue hematoma.    IMPRESSION: No acute traumatic visceral injuries are seen.   Mild stable compression deformity L1 and L3. There is no evidence of new acute fracture or dislocation. No focal subcutaneous soft tissue hematoma.  Please refer to detailed findings otherwise described above.    CT Cervical Spine No Cont (23 @ 02:28)     CT BRAIN:  No evidence of acute intracranial hemorrhage, midline shift or CT evidence of acute territorial infarct.    If the patient's symptoms persist, consider short interval follow-up head CT or brain MRI if there are no MRI contraindications.    CT CERVICAL SPINE:    No acute cervical fracture or traumatic malalignment.    MRI would be required to evaluate the ligamentous structures at higher sensitivity as well as for better evaluation of the cervical canal and its contents.      Plan of care was discussed with patient and /or primary care giver; all questions and concerns were addressed and care was aligned with patient's wishes.

## 2023-05-20 NOTE — PROGRESS NOTE ADULT - ASSESSMENT
82F w/ pmhx of thyroid disease not on any medications at home prior multiple UTIs admitted s/p fall/ found down after recently returning home from rehab. Son states pt usually more oriented. Concern for UTI contributing to current fall and acute encephalopathy    D/D:  Acute encephalopathy suspected Metabolic  Suspected UTI  Polyarticular arthritis    Plan:  Cont. IV Ceftriaxone  F/u Urine Cx; if negative can complete 3 days total'; if positive 5 to 7 days based on sensitivity  Falls and aspiration precautions  PT eval appreciated; JENIFFER recommended  Get ESR, CRP, RF AM given multiple joint pains  Follow up TSH; Added on to AM labs  DVT PPx, Lovenox .     Discussed with patient via ;   ACP on call Maximino spoke with Son this afternoon; confirmed no home meds x 2 yrs 82F w/ pmhx of thyroid disease not on any medications at home prior multiple UTIs admitted s/p fall/ found down after recently returning home from rehab. Son states pt usually more oriented. Concern for UTI contributing to current fall and acute encephalopathy    D/D:  Acute encephalopathy suspected Metabolic  Suspected UTI  Polyarticular arthritis  Age related physical deconditioning    Plan:  Cont. IV Ceftriaxone  F/u Urine Cx; if negative can complete 3 days total'; if positive 5 to 7 days based on sensitivity  Falls and aspiration precautions  PT eval appreciated; JENIFFER recommended  Get ESR, CRP, RF AM given multiple joint pains  Follow up TSH; Added on to AM labs  DVT PPx, Lovenox .     Discussed with patient via ;   Discussed with ACP on call Lara who spoke with Son this afternoon; confirmed no home meds x 2 yrs

## 2023-05-20 NOTE — PHYSICAL THERAPY INITIAL EVALUATION ADULT - REHAB POTENTIAL, PT EVAL
fair, will monitor progress closely Kentrell Smith, PGY1 - patient informed of the x ray result. No fractures/dislocation. Will put on knee brace and dc with outpatient ortho f/u. Kentrell Smith, PGY1 - patient informed of the x ray result. No fractures/dislocation. Will put on knee brace, provide crutches and dc with outpatient ortho f/u.

## 2023-05-20 NOTE — PHYSICAL THERAPY INITIAL EVALUATION ADULT - PERTINENT HX OF CURRENT PROBLEM, REHAB EVAL
Per chart review: "82F w/ pmhx of thyroid disease, multiple UTIs p/w fall/ found down after recently returning home from rehab"

## 2023-05-21 LAB
ANION GAP SERPL CALC-SCNC: 3 MMOL/L — LOW (ref 5–17)
BUN SERPL-MCNC: 10 MG/DL — SIGNIFICANT CHANGE UP (ref 7–18)
CALCIUM SERPL-MCNC: 8.8 MG/DL — SIGNIFICANT CHANGE UP (ref 8.4–10.5)
CHLORIDE SERPL-SCNC: 113 MMOL/L — HIGH (ref 96–108)
CO2 SERPL-SCNC: 25 MMOL/L — SIGNIFICANT CHANGE UP (ref 22–31)
CREAT SERPL-MCNC: 0.35 MG/DL — LOW (ref 0.5–1.3)
CRP SERPL-MCNC: <3 MG/L — SIGNIFICANT CHANGE UP
EGFR: 101 ML/MIN/1.73M2 — SIGNIFICANT CHANGE UP
ERYTHROCYTE [SEDIMENTATION RATE] IN BLOOD: 20 MM/HR — SIGNIFICANT CHANGE UP (ref 0–20)
GLUCOSE SERPL-MCNC: 88 MG/DL — SIGNIFICANT CHANGE UP (ref 70–99)
HCT VFR BLD CALC: 33.8 % — LOW (ref 34.5–45)
HGB BLD-MCNC: 10.6 G/DL — LOW (ref 11.5–15.5)
MCHC RBC-ENTMCNC: 26.6 PG — LOW (ref 27–34)
MCHC RBC-ENTMCNC: 31.4 GM/DL — LOW (ref 32–36)
MCV RBC AUTO: 84.9 FL — SIGNIFICANT CHANGE UP (ref 80–100)
NRBC # BLD: 0 /100 WBCS — SIGNIFICANT CHANGE UP (ref 0–0)
PLATELET # BLD AUTO: 236 K/UL — SIGNIFICANT CHANGE UP (ref 150–400)
POTASSIUM SERPL-MCNC: 3.4 MMOL/L — LOW (ref 3.5–5.3)
POTASSIUM SERPL-SCNC: 3.4 MMOL/L — LOW (ref 3.5–5.3)
RBC # BLD: 3.98 M/UL — SIGNIFICANT CHANGE UP (ref 3.8–5.2)
RBC # FLD: 14.8 % — HIGH (ref 10.3–14.5)
RHEUMATOID FACT SERPL-ACNC: <10 IU/ML — SIGNIFICANT CHANGE UP (ref 0–13)
SODIUM SERPL-SCNC: 141 MMOL/L — SIGNIFICANT CHANGE UP (ref 135–145)
WBC # BLD: 4.5 K/UL — SIGNIFICANT CHANGE UP (ref 3.8–10.5)
WBC # FLD AUTO: 4.5 K/UL — SIGNIFICANT CHANGE UP (ref 3.8–10.5)

## 2023-05-21 PROCEDURE — 99232 SBSQ HOSP IP/OBS MODERATE 35: CPT

## 2023-05-21 PROCEDURE — 72131 CT LUMBAR SPINE W/O DYE: CPT | Mod: 26

## 2023-05-21 RX ORDER — POTASSIUM CHLORIDE 20 MEQ
20 PACKET (EA) ORAL ONCE
Refills: 0 | Status: COMPLETED | OUTPATIENT
Start: 2023-05-21 | End: 2023-05-21

## 2023-05-21 RX ORDER — SODIUM CHLORIDE 9 MG/ML
500 INJECTION INTRAMUSCULAR; INTRAVENOUS; SUBCUTANEOUS ONCE
Refills: 0 | Status: COMPLETED | OUTPATIENT
Start: 2023-05-21 | End: 2023-05-21

## 2023-05-21 RX ADMIN — Medication 650 MILLIGRAM(S): at 16:23

## 2023-05-21 RX ADMIN — SODIUM CHLORIDE 3 MILLILITER(S): 9 INJECTION INTRAMUSCULAR; INTRAVENOUS; SUBCUTANEOUS at 21:50

## 2023-05-21 RX ADMIN — SODIUM CHLORIDE 3 MILLILITER(S): 9 INJECTION INTRAMUSCULAR; INTRAVENOUS; SUBCUTANEOUS at 05:29

## 2023-05-21 RX ADMIN — Medication 650 MILLIGRAM(S): at 06:29

## 2023-05-21 RX ADMIN — Medication 20 MILLIEQUIVALENT(S): at 09:18

## 2023-05-21 RX ADMIN — CEFTRIAXONE 100 MILLIGRAM(S): 500 INJECTION, POWDER, FOR SOLUTION INTRAMUSCULAR; INTRAVENOUS at 05:06

## 2023-05-21 RX ADMIN — ENOXAPARIN SODIUM 40 MILLIGRAM(S): 100 INJECTION SUBCUTANEOUS at 05:06

## 2023-05-21 RX ADMIN — SODIUM CHLORIDE 1000 MILLILITER(S): 9 INJECTION INTRAMUSCULAR; INTRAVENOUS; SUBCUTANEOUS at 17:52

## 2023-05-21 RX ADMIN — Medication 650 MILLIGRAM(S): at 05:26

## 2023-05-21 RX ADMIN — SODIUM CHLORIDE 3 MILLILITER(S): 9 INJECTION INTRAMUSCULAR; INTRAVENOUS; SUBCUTANEOUS at 16:28

## 2023-05-21 RX ADMIN — Medication 650 MILLIGRAM(S): at 18:05

## 2023-05-21 NOTE — DISCHARGE NOTE PROVIDER - HOSPITAL COURSE
82 year old female from home with PMHx of thyroid disease presenting with son after a fall. Patient does not remember the fall but states that she was brought to the hospital by her son. Patient was found out of her bed on the ground.    As per discussion with son and daughter in law, pt had multiple UTIs, recently in rehab for a month and discharged to home 4 days ago. Has HHA until 9pm but then alone. Trouble walking recently and needs a walker. Seems like pt fell and hit her help button with police coming to house and breaking door down. Son states pt usually is decently oriented. No recently complaints to family.    82 year old female from home with PMHx of thyroid disease presenting with son after a fall. Patient does not remember the fall but states that she was brought to the hospital by her son. Patient was found out of her bed on the ground.  As per discussion with son and daughter in law, pt had multiple UTIs, recently in rehab for a month and discharged to home 4 days ago. Has HHA until 9pm but then alone. Trouble walking recently and needs a walker. Seems like pt fell and hit her help button with police coming to house and breaking door down. Son states pt usually is decently oriented.    UA is positive, pending urine culture. Started on ceftriaxone. CT chest showed patent central airways, no pulmonary nodules, minimal bibasilar subsegmental atelectasis. CTH and CT Cspine neg. CT abdomen showed mild stable compression deformity of L1&L3, neg for acute findings.    PT recs JENIFFER   82 year old female from home with PMHx of thyroid disease presenting with son after a fall. Patient does not remember the fall but states that she was brought to the hospital by her son. Patient was found out of her bed on the ground.  As per discussion with son and daughter in law, pt had multiple UTIs, recently in rehab for a month and discharged to home 4 days ago. Has HHA until 9pm but then alone. Trouble walking recently and needs a walker. Seems like pt fell and hit her help button with police coming to house and breaking door down. Son states pt usually is decently oriented.    UA is positive, pending urine culture. Started on ceftriaxone. CT chest showed patent central airways, no pulmonary nodules, minimal bibasilar subsegmental atelectasis. CTH and CT Cspine neg. CT abdomen showed mild stable compression deformity of L1&L3, neg for acute findings.    PT recs JENIFFER 82 year old female from home with PMHx of thyroid disease presenting with son after a fall. Patient does not remember the fall but states that she was brought to the hospital by her son. Patient was found out of her bed on the ground.  As per discussion with son and daughter in law, pt had multiple UTIs, recently in rehab for a month and discharged to home 4 days ago. Has HHA until 9pm but then alone. Trouble walking recently and needs a walker. Seems like pt fell and hit her help button with police coming to house and breaking door down. Son states pt usually is decently oriented.    UA is positive, urine culture positive for Ecoli. Started on ceftriaxone. CT chest showed patent central airways, no pulmonary nodules, minimal bibasilar subsegmental atelectasis. CTH and CT Cspine neg. CT abdomen showed mild stable compression deformity of L1&L3, neg for acute findings.    PT recs JENIFFER 82 year old female from home w/ hx of thyroid disease presenting with son after a fall. Patient does not remember fall but states that she was brought to the hospital by her son. Patient was found out of her bed on the ground. As per discussion with son and daughter in law, pt had multiple UTIs, recently in rehab for a month and got home 4 days ago. Has HHA until 9pm but then alone. Trouble walking recently and needs walker. Seems like pt fell and hit her help button with police coming to house and breaking door down. Son states pt usually is decently oriented. No recently complaints to family.  Urine cx (+) for E.coli, completed course of IV antibiotic  CTH is neg, CT Cspine showed no acute cervical fracture or traumatic malalignment. CT chest showed No acute traumatic visceral injuries are seen. Mild stable compression deformity L1 and L3. There is no evidence of new   acute fracture or dislocation. No focal subcutaneous soft tissue hematoma.  Pt was complaining of lower back pain and lower extremity pain. CT lumbar and spine showed interval acute compression fracture of the superior endplate of L2 with loss of 25% of vertebral body height but no significant bony retropulsion. Dr. Cole was consulted  PT recs JENIFFER    Pt is medically optimized for discharge, discussed with the attending Dr Vaughn  This is just a brief hospital course, please refer to the progress notes for detailed information

## 2023-05-21 NOTE — DISCHARGE NOTE PROVIDER - CARE PROVIDER_API CALL
Aiden Cole (MD; MINNIE; MS)  Neurosurgery  [address], [address]  [place] [address]  Phone: [phone]  Fax: [phone]  Follow Up Time: 1 week

## 2023-05-21 NOTE — PROGRESS NOTE ADULT - ASSESSMENT
82F w/ pmhx of thyroid disease not on any medications at home prior multiple UTIs admitted s/p fall/ found down after recently returning home from rehab. Son states pt usually more oriented. Concern for UTI contributing to current fall and acute encephalopathy    D/D:  Acute encephalopathy suspected Metabolic  with Acute UTI with E. Coli  Polyarticular arthritis  Age related physical deconditioning    Plan:  Cont. IV Ceftriaxone  F/u Urine Cx; positive; complete 5 to 7 days based on sensitivity  Falls and aspiration precautions  PT eval appreciated; JENIFFER recommended  Get ESR, CRP, RF AM given multiple joint pains  Follow up TSH; Added on to AM labs  DVT PPx, Lovenox .     Discussed with patient via ;   Discussed with ACP on call Lara who spoke with Son this afternoon; confirmed no home meds x 2 yrs 82 Female w/ pmhx of thyroid disease not on any medications at home prior multiple UTIs admitted s/p fall/ found down after recently returning home from rehab. Son states pt usually more oriented. Concern for UTI contributing to current fall and acute encephalopathy    D/D:  Acute encephalopathy suspected Metabolic  with Acute UTI with E. 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 IV Ceftriaxone; Follow up Cx sensitivity  F/u Urine Cx; positive; complete 5 to 7 days based on sensitivity  Falls and aspiration precautions  PT eval appreciated; JENIFFER recommended  ESR, CRP, RF all negative; TSH WNL  GET AM CORTISOL PLEASE RULE OUT AI, less likely  DVT PPx, Lovenox .     Discussed with patient via ;   Discussed with ACP on call Anabella; send Cortisol AM  Spoke with Son over the phone and then arrived at bedside; Patient was recently in Rehab for 30 days does not feel much helped; Prefer to take home with Home PT currently in place  D/C Plan once BP stable;

## 2023-05-21 NOTE — DISCHARGE NOTE PROVIDER - NSDCMRMEDTOKEN_GEN_ALL_CORE_FT
acetaminophen 325 mg oral tablet: 2 tab(s) orally every 6 hours as needed for  moderate pain  lidocaine 4% topical film: Apply topically to affected area once a day   acetaminophen 325 mg oral tablet: 2 tab(s) orally every 6 hours as needed for  moderate pain  lidocaine 4% topical film: Apply topically to affected area once a day  Outpatient Physical Therapy: Outpatient Physical Therapy 3x-5x per week

## 2023-05-21 NOTE — DISCHARGE NOTE PROVIDER - ATTENDING DISCHARGE PHYSICAL EXAMINATION:
Patient was seen and examined at bedside. denies any complains    ICU Vital Signs Last 24 Hrs  T(C): 36.1 (24 May 2023 10:56), Max: 36.5 (24 May 2023 04:55)  T(F): 97 (24 May 2023 10:56), Max: 97.7 (24 May 2023 04:55)  HR: 78 (24 May 2023 10:56) (69 - 78)  BP: 106/55 (24 May 2023 10:56) (102/54 - 118/54)  BP(mean): --  ABP: --  ABP(mean): --  RR: 17 (24 May 2023 10:56) (16 - 17)  SpO2: 98% (24 May 2023 10:56) (98% - 99%)    O2 Parameters below as of 24 May 2023 10:56  Patient On (Oxygen Delivery Method): room air    P/E:  NAD  AAOx1-2, no new focal deficit  CTABL  S1S2 WNL    Labs noted     A/P:  Patient not in pain. Will cont Lidocaine patch. PT eval [facility]ENIFFER but family opted to take home. CM following for safe discharge. Medically stable to safe discharge. Face to face signed to start home care and Home PT. Patient will benefit for increased hours of HHA due to her new fractures and difficult ambulation and recurrent falls.         Patient was seen and examined at bedside. denies any complains    ICU Vital Signs Last 24 Hrs  T(C): 36.1 (24 May 2023 10:56), Max: 36.5 (24 May 2023 04:55)  T(F): 97 (24 May 2023 10:56), Max: 97.7 (24 May 2023 04:55)  HR: 78 (24 May 2023 10:56) (69 - 78)  BP: 106/55 (24 May 2023 10:56) (102/54 - 118/54)  BP(mean): --  ABP: --  ABP(mean): --  RR: 17 (24 May 2023 10:56) (16 - 17)  SpO2: 98% (24 May 2023 10:56) (98% - 99%)    O2 Parameters below as of 24 May 2023 10:56  Patient On (Oxygen Delivery Method): room air    P/E:  NAD  AAOx1-2, no new focal deficit  CTABL  S1S2 WNL    Labs noted     A/P:  Patient not in pain. Will cont Lidocaine patch. PT eval [facility]ENIFFER but family opted to take home. CM following for safe discharge. Medically stable to safe discharge. Face to face signed to start home care and Home PT. Patient will benefit for increased hours of HHA due to her new fractures and difficult ambulation and recurrent falls. Completed treatment for UTI.

## 2023-05-21 NOTE — DISCHARGE NOTE PROVIDER - NSDCCPCAREPLAN_GEN_ALL_CORE_FT
PRINCIPAL DISCHARGE DIAGNOSIS  Diagnosis: Acute UTI  Assessment and Plan of Treatment: Pending urine cx  If you were prescribed antibiotics, take them exactly as your caregiver instructs you. Finish the medication even if you feel better after you have only taken some of the medication.  Drink enough water and fluids to keep your urine clear or pale yellow.  Avoid caffeine, tea, and carbonated beverages. They tend to irritate your bladder.  Empty your bladder often. Avoid holding urine for long periods of time.  After a bowel movement, women should cleanse from front to back. Use each tissue only once.  SEEK MEDICAL CARE IF:  You have back pain.  You develop a fever.  Your symptoms do not begin to resolve within 3 days.  SEEK IMMEDIATE MEDICAL CARE IF:  You have severe back pain or lower abdominal pain.  You develop chills.  You have nausea or vomiting.  You have continued burning or discomfort with urination.       PRINCIPAL DISCHARGE DIAGNOSIS  Diagnosis: Acute metabolic encephalopathy  Assessment and Plan of Treatment: You presented after a fall and disoriented. This is likely due to urinary tract infection. Your urine culture is positive for Ecoli. You were given IV anbiotics        SECONDARY DISCHARGE DIAGNOSES  Diagnosis: Acute UTI  Assessment and Plan of Treatment: Your urine is positive for Ecoli. You were given antibiotics  If you were prescribed antibiotics, take them exactly as your caregiver instructs you. Finish the medication even if you feel better after you have only taken some of the medication.  Drink enough water and fluids to keep your urine clear or pale yellow.  Avoid caffeine, tea, and carbonated beverages. They tend to irritate your bladder.  Empty your bladder often. Avoid holding urine for long periods of time.  After a bowel movement, women should cleanse from front to back. Use each tissue only once.  SEEK MEDICAL CARE IF:  You have back pain.  You develop a fever.  Your symptoms do not begin to resolve within 3 days.  SEEK IMMEDIATE MEDICAL CARE IF:  You have severe back pain or lower abdominal pain.  You develop chills.  You have nausea or vomiting.  You have continued burning or discomfort with urination.       PRINCIPAL DISCHARGE DIAGNOSIS  Diagnosis: Acute metabolic encephalopathy  Assessment and Plan of Treatment: You presented after a fall and disoriented. This is likely due to urinary tract infection. Your urine culture is positive for Ecoli. You were given IV antibiotics      SECONDARY DISCHARGE DIAGNOSES  Diagnosis: Compression fx, lumbar spine  Assessment and Plan of Treatment: You had lower back pain and presented after a fall. CT of lumbar and spine showed interval acute compression fracture of the superior endplate of L2 with loss of 25% of vertebral body height but no significant bony  retropulsion.  Neurosurgery Dr. Cole was consulted with no acute interventions, pain control  F/U in a week with Dr Cole [phone]  You were seen by physical therapist and recommended sub acute rehab. However, You prefer to go home    Diagnosis: Acute UTI  Assessment and Plan of Treatment: Your urine is positive for Ecoli. You were given antibiotics  If you were prescribed antibiotics, take them exactly as your caregiver instructs you. Finish the medication even if you feel better after you have only taken some of the medication.  Drink enough water and fluids to keep your urine clear or pale yellow.  Avoid caffeine, tea, and carbonated beverages. They tend to irritate your bladder.  Empty your bladder often. Avoid holding urine for long periods of time.  After a bowel movement, women should cleanse from front to back. Use each tissue only once.  SEEK MEDICAL CARE IF:  You have back pain.  You develop a fever.  Your symptoms do not begin to resolve within 3 days.  SEEK IMMEDIATE MEDICAL CARE IF:  You have severe back pain or lower abdominal pain.  You develop chills.  You have nausea or vomiting.  You have continued burning or discomfort with urination.

## 2023-05-21 NOTE — DISCHARGE NOTE PROVIDER - NSFOLLOWUPCLINICS_GEN_ALL_ED_FT
[facility]  Internal Medicine  [address] [place] [address]  Phone: [phone]  Fax: [phone]

## 2023-05-21 NOTE — PROGRESS NOTE ADULT - SUBJECTIVE AND OBJECTIVE BOX
MEDICAL ATTENDING NOTE    82 year old female from home w/ hx of thyroid disease presenting with son to ED after a fall. Patient does not remember fall but states that she was brought to the hospital by her son. Per ED sign out patient was found out of her bed on the ground. Patient with Hx of multiple UTIs, recently in rehab for a month and got home 4 days ago. Has HHA until 9 pm but then alone. Trouble walking recently and needs walker. Seems like pt fell and hit her help button with police coming to house and breaking door down.    INTERVAL HPI/OVERNIGHT EVENTS: Patient is doing well; very pleasant;  comfortable in bed; c/o pain in both knee joints and arms. ate well. denies fever, chills, SOB, palpitations, chest pain, nausea, vomiting, diarrhea, constipation    MEDICATIONS  (STANDING):  cefTRIAXone   IVPB 1000 milliGRAM(s) IV Intermittent every 24 hours  enoxaparin Injectable 40 milliGRAM(s) SubCutaneous every 24 hours  sodium chloride 0.9% lock flush 3 milliLiter(s) IV Push every 8 hours  sodium chloride 0.9% with potassium chloride 20 mEq/L 1000 milliLiter(s) (100 mL/Hr) IV Continuous <Continuous>    MEDICATIONS  (PRN):  acetaminophen     Tablet .. 650 milliGRAM(s) Oral every 6 hours PRN Temp greater or equal to 38C (100.4F), Mild Pain (1 - 3)    Vital Signs Last 24 Hrs  T(C): 36.2 (21 May 2023 13:31), Max: 36.7 (20 May 2023 20:57)  T(F): 97.2 (21 May 2023 13:31), Max: 98 (20 May 2023 20:57)  HR: 75 (21 May 2023 13:31) (64 - 75)  BP: 121/62 (21 May 2023 13:31) (114/52 - 133/55)  RR: 17 (21 May 2023 13:31) (17 - 18)  SpO2: 98% (21 May 2023 13:31) (96% - 98%): room air        ________________________________________________  PHYSICAL EXAM:  Psych: AAO x3  Neuro: No gross focal deficits; Power and sensation intact  CVS: S1S2 present, regular, no edema  Resp: BLAE+, No wheeze or Rhonchi  GI: Soft, BS+, Non tender, non distended  Extr: No  calf tenderness B/L Lower extremities  Skin: Warm and moist without any rashes    _________________________________________________  LABS:                        10.6   4.50  )-----------( 236      ( 21 May 2023 04:49 )             33.8   05-21    141  |  113<H>  |  10  ----------------------------<  88  3.4<L>   |  25  |  0.35<L>    Ca    8.8      21 May 2023 04:49    Sedimentation Rate, Erythrocyte (05.21.23 @ 04:49) Sedimentation Rate, Erythrocyte: 20 mm/Hr  Culture - Urine (05.19.23 @ 06:11)   Specimen Source: Clean Catch Clean Catch (Midstream)  Culture Results: 50,000 - 99,000 CFU/mL Escherichia coli    RADIOLOGY & ADDITIONAL TESTS:    CT Abdomen and Pelvis w/ IV Cont (05.19.23 @ 02:28)     FINDINGS:  CHEST:  LUNGS AND LARGE AIRWAYS: Patent central airways. No pulmonary nodules.  Minimal bibasilar subsegmental atelectasis.  PLEURA: No pleural effusion.  VESSELS: Within normal limits.  HEART: Heart size is normal. No pericardial effusion.  MEDIASTINUM AND ADRIANO: No lymphadenopathy.  CHEST WALL AND LOWER NECK: Within normal limits.    ABDOMEN AND PELVIS:  LIVER: Within normal limits.  BILE DUCTS: Normal caliber.  GALLBLADDER: Within normal limits.  SPLEEN: Within normal limits.  PANCREAS: Within normal limits.  ADRENALS: Within normal limits.  KIDNEYS/URETERS: Within normal limits.    BLADDER: Moderately distended. Gas within the nondependent portion of urinary bladder.  REPRODUCTIVE ORGANS: Supracervical hysterectomy.    There is a moderate amount of stool throughout the colon.  No acute traumatic visceral injuries are seen.    BOWEL: No bowel obstruction. Appendix is not visualized. No evidence of inflammation in the pericecal region.  PERITONEUM: No ascites.  No free air or abscess.  VESSELS: Within normal limits.  RETROPERITONEUM/LYMPH NODES: No lymphadenopathy. No retroperitoneal hemorrhage.  ABDOMINAL WALL: Within normal limits.  BONES: Degenerative changes. No lytic or blastic process. Mild stable compression deformity L1 and L3. There is no evidence of new acute fracture or dislocation. No focal subcutaneous soft tissue hematoma.    IMPRESSION:   No acute traumatic visceral injuries are seen.  Mild stable compression deformity L1 and L3. There is no evidence of new acute fracture or dislocation. No focal subcutaneous soft tissue hematoma.  Please refer to detailed findings otherwise described above.    CT Cervical Spine No Cont (05.19.23 @ 02:28)     CT BRAIN:  No evidence of acute intracranial hemorrhage, midline shift or CT evidence of acute territorial infarct.  If the patient's symptoms persist, consider short interval follow-up head CT or brain MRI if there are no MRI contraindications.    CT CERVICAL SPINE:  No acute cervical fracture or traumatic malalignment.  MRI would be required to evaluate the ligamentous structures at higher sensitivity as well as for better evaluation of the cervical canal and its contents.      Plan of care was discussed with patient and /or primary care giver; all questions and concerns were addressed and care was aligned with patient's wishes.     MEDICAL ATTENDING NOTE    82 year old female from home w/ hx of thyroid disease presenting with son to ED after a fall. Patient does not remember fall but states that she was brought to the hospital by her son. Per ED sign out patient was found out of her bed on the ground. Patient with Hx of multiple UTIs, recently in rehab for a month and got home 4 days ago. Has HHA until 9 pm but then alone. Trouble walking recently and needs walker. Seems like pt fell and hit her help button with police coming to house and breaking door down.    INTERVAL HPI/OVERNIGHT EVENTS: Patient is doing well; very pleasant;  comfortable in bed; eat well but only chicken this afternoon but not rice. not much pain in arms or leg today. Orthostasis was attempted but on standing up patient with significant dizziness and BP could not be obtained. Patient denies fever, chills, SOB, palpitations, chest pain, nausea, vomiting, diarrhea, constipation    MEDICATIONS  (STANDING):  cefTRIAXone   IVPB 1000 milliGRAM(s) IV Intermittent every 24 hours  enoxaparin Injectable 40 milliGRAM(s) SubCutaneous every 24 hours  sodium chloride 0.9% lock flush 3 milliLiter(s) IV Push every 8 hours  sodium chloride 0.9% with potassium chloride 20 mEq/L 1000 milliLiter(s) (100 mL/Hr) IV Continuous <Continuous>    MEDICATIONS  (PRN):  acetaminophen     Tablet .. 650 milliGRAM(s) Oral every 6 hours PRN Temp greater or equal to 38C (100.4F), Mild Pain (1 - 3)    Vital Signs Last 24 Hrs  T(C): 36.2 (21 May 2023 13:31), Max: 36.7 (20 May 2023 20:57)  T(F): 97.2 (21 May 2023 13:31), Max: 98 (20 May 2023 20:57)  HR: 75 (21 May 2023 13:31) (64 - 75)  BP: 121/62 (21 May 2023 13:31) (114/52 - 133/55)  RR: 17 (21 May 2023 13:31) (17 - 18)  SpO2: 98% (21 May 2023 13:31) (96% - 98%): room air    ________________________________________________  PHYSICAL EXAM:  Psych: AAO x3  Neuro: No gross focal deficits; Power and sensation intact  CVS: S1S2 present, regular, no edema  Resp: BLAE+, No wheeze or Rhonchi  GI: Soft, BS+, Non tender, non distended  Extr: No  calf tenderness B/L Lower extremities  Skin: Warm and moist without any rashes    _________________________________________________  LABS:                        10.6   4.50  )-----------( 236      ( 21 May 2023 04:49 )             33.8   05-21    141  |  113<H>  |  10  ----------------------------<  88  3.4<L>   |  25  |  0.35<L>  Ca    8.8      21 May 2023 04:49    Thyroid Stimulating Hormone, Serum (05.20.23 @ 05:34) Thyroid Stimulating Hormone, Serum: 0.99 uU/mL  Sedimentation Rate, Erythrocyte (05.21.23 @ 04:49) Sedimentation Rate, Erythrocyte: 20 mm/Hr  C-Reactive Protein, Serum (05.21.23 @ 04:49) C-Reactive Protein, Serum: <3  Rheumatoid Factor Quant, Serum or Plasma in AM (05.21.23 @ 04:49) Rheumatoid Factor Quant, Serum or Plasma: <10:   Culture - Urine (05.19.23 @ 06:11)   Specimen Source: Clean Catch Clean Catch (Midstream)  Culture Results: 50,000 - 99,000 CFU/mL Escherichia coli    RADIOLOGY & ADDITIONAL TESTS:    CT Abdomen and Pelvis w/ IV Cont (05.19.23 @ 02:28)     FINDINGS:  CHEST:  LUNGS AND LARGE AIRWAYS: Patent central airways. No pulmonary nodules.  Minimal bibasilar subsegmental atelectasis.  PLEURA: No pleural effusion.  VESSELS: Within normal limits.  HEART: Heart size is normal. No pericardial effusion.  MEDIASTINUM AND ADRIANO: No lymphadenopathy.  CHEST WALL AND LOWER NECK: Within normal limits.    ABDOMEN AND PELVIS:  LIVER: Within normal limits.  BILE DUCTS: Normal caliber.  GALLBLADDER: Within normal limits.  SPLEEN: Within normal limits.  PANCREAS: Within normal limits.  ADRENALS: Within normal limits.  KIDNEYS/URETERS: Within normal limits.    BLADDER: Moderately distended. Gas within the nondependent portion of urinary bladder.  REPRODUCTIVE ORGANS: Supracervical hysterectomy.    There is a moderate amount of stool throughout the colon.  No acute traumatic visceral injuries are seen.    BOWEL: No bowel obstruction. Appendix is not visualized. No evidence of inflammation in the pericecal region.  PERITONEUM: No ascites.  No free air or abscess.  VESSELS: Within normal limits.  RETROPERITONEUM/LYMPH NODES: No lymphadenopathy. No retroperitoneal hemorrhage.  ABDOMINAL WALL: Within normal limits.  BONES: Degenerative changes. No lytic or blastic process. Mild stable compression deformity L1 and L3. There is no evidence of new acute fracture or dislocation. No focal subcutaneous soft tissue hematoma.    IMPRESSION:   No acute traumatic visceral injuries are seen.  Mild stable compression deformity L1 and L3. There is no evidence of new acute fracture or dislocation. No focal subcutaneous soft tissue hematoma.  Please refer to detailed findings otherwise described above.    CT Cervical Spine No Cont (05.19.23 @ 02:28)     CT BRAIN:  No evidence of acute intracranial hemorrhage, midline shift or CT evidence of acute territorial infarct.  If the patient's symptoms persist, consider short interval follow-up head CT or brain MRI if there are no MRI contraindications.    CT CERVICAL SPINE:  No acute cervical fracture or traumatic malalignment.  MRI would be required to evaluate the ligamentous structures at higher sensitivity as well as for better evaluation of the cervical canal and its contents.      Plan of care was discussed with patient and /or primary care giver; all questions and concerns were addressed and care was aligned with patient's wishes.

## 2023-05-21 NOTE — DISCHARGE NOTE PROVIDER - NSDCQMAMI_CARD_ALL_CORE
No
History of abuse/trauma/Current mood episode/PTSD current/past/Family History of psychiatric diagnoses requiring hospitalization/Alcohol/Substance abuse disorders/Agitation/Severe Anxiety/Panic

## 2023-05-21 NOTE — DISCHARGE NOTE NURSING/CASE MANAGEMENT/SOCIAL WORK - NSDCPEFALRISK_GEN_ALL_CORE
For information on Fall & Injury Prevention, visit: [email or web address] OR  [email or web address] OR  https://www.cdc.gov/steadi/patient.html

## 2023-05-22 DIAGNOSIS — Z02.9 ENCOUNTER FOR ADMINISTRATIVE EXAMINATIONS, UNSPECIFIED: ICD-10-CM

## 2023-05-22 DIAGNOSIS — G93.41 METABOLIC ENCEPHALOPATHY: ICD-10-CM

## 2023-05-22 DIAGNOSIS — I95.1 ORTHOSTATIC HYPOTENSION: ICD-10-CM

## 2023-05-22 LAB
-  AMIKACIN: SIGNIFICANT CHANGE UP
-  AMOXICILLIN/CLAVULANIC ACID: SIGNIFICANT CHANGE UP
-  AMPICILLIN/SULBACTAM: SIGNIFICANT CHANGE UP
-  AMPICILLIN: SIGNIFICANT CHANGE UP
-  AZTREONAM: SIGNIFICANT CHANGE UP
-  CEFAZOLIN: SIGNIFICANT CHANGE UP
-  CEFEPIME: SIGNIFICANT CHANGE UP
-  CEFOXITIN: SIGNIFICANT CHANGE UP
-  CEFTRIAXONE: SIGNIFICANT CHANGE UP
-  CEFUROXIME: SIGNIFICANT CHANGE UP
-  CIPROFLOXACIN: SIGNIFICANT CHANGE UP
-  ERTAPENEM: SIGNIFICANT CHANGE UP
-  GENTAMICIN: SIGNIFICANT CHANGE UP
-  IMIPENEM: SIGNIFICANT CHANGE UP
-  LEVOFLOXACIN: SIGNIFICANT CHANGE UP
-  MEROPENEM: SIGNIFICANT CHANGE UP
-  NITROFURANTOIN: SIGNIFICANT CHANGE UP
-  PIPERACILLIN/TAZOBACTAM: SIGNIFICANT CHANGE UP
-  TOBRAMYCIN: SIGNIFICANT CHANGE UP
-  TRIMETHOPRIM/SULFAMETHOXAZOLE: SIGNIFICANT CHANGE UP
ANION GAP SERPL CALC-SCNC: 5 MMOL/L — SIGNIFICANT CHANGE UP (ref 5–17)
BUN SERPL-MCNC: 12 MG/DL — SIGNIFICANT CHANGE UP (ref 7–18)
CALCIUM SERPL-MCNC: 8.8 MG/DL — SIGNIFICANT CHANGE UP (ref 8.4–10.5)
CHLORIDE SERPL-SCNC: 114 MMOL/L — HIGH (ref 96–108)
CO2 SERPL-SCNC: 22 MMOL/L — SIGNIFICANT CHANGE UP (ref 22–31)
CORTIS AM PEAK SERPL-MCNC: 11.5 UG/DL — SIGNIFICANT CHANGE UP (ref 6–18.4)
CORTIS AM PEAK SERPL-MCNC: 19.1 UG/DL — HIGH (ref 6–18.4)
CREAT SERPL-MCNC: 0.34 MG/DL — LOW (ref 0.5–1.3)
CULTURE RESULTS: SIGNIFICANT CHANGE UP
EGFR: 102 ML/MIN/1.73M2 — SIGNIFICANT CHANGE UP
GLUCOSE SERPL-MCNC: 94 MG/DL — SIGNIFICANT CHANGE UP (ref 70–99)
MAGNESIUM SERPL-MCNC: 2.3 MG/DL — SIGNIFICANT CHANGE UP (ref 1.6–2.6)
METHOD TYPE: SIGNIFICANT CHANGE UP
ORGANISM # SPEC MICROSCOPIC CNT: SIGNIFICANT CHANGE UP
ORGANISM # SPEC MICROSCOPIC CNT: SIGNIFICANT CHANGE UP
PHOSPHATE SERPL-MCNC: 3.1 MG/DL — SIGNIFICANT CHANGE UP (ref 2.5–4.5)
POTASSIUM SERPL-MCNC: 3.5 MMOL/L — SIGNIFICANT CHANGE UP (ref 3.5–5.3)
POTASSIUM SERPL-SCNC: 3.5 MMOL/L — SIGNIFICANT CHANGE UP (ref 3.5–5.3)
SODIUM SERPL-SCNC: 141 MMOL/L — SIGNIFICANT CHANGE UP (ref 135–145)
SPECIMEN SOURCE: SIGNIFICANT CHANGE UP

## 2023-05-22 PROCEDURE — 99232 SBSQ HOSP IP/OBS MODERATE 35: CPT

## 2023-05-22 RX ORDER — LIDOCAINE 4 G/100G
1 CREAM TOPICAL DAILY
Refills: 0 | Status: DISCONTINUED | OUTPATIENT
Start: 2023-05-22 | End: 2023-05-24

## 2023-05-22 RX ADMIN — ENOXAPARIN SODIUM 40 MILLIGRAM(S): 100 INJECTION SUBCUTANEOUS at 06:20

## 2023-05-22 RX ADMIN — LIDOCAINE 1 PATCH: 4 CREAM TOPICAL at 18:57

## 2023-05-22 RX ADMIN — LIDOCAINE 1 PATCH: 4 CREAM TOPICAL at 19:18

## 2023-05-22 RX ADMIN — SODIUM CHLORIDE 3 MILLILITER(S): 9 INJECTION INTRAMUSCULAR; INTRAVENOUS; SUBCUTANEOUS at 22:00

## 2023-05-22 RX ADMIN — SODIUM CHLORIDE 3 MILLILITER(S): 9 INJECTION INTRAMUSCULAR; INTRAVENOUS; SUBCUTANEOUS at 06:10

## 2023-05-22 RX ADMIN — CEFTRIAXONE 100 MILLIGRAM(S): 500 INJECTION, POWDER, FOR SOLUTION INTRAMUSCULAR; INTRAVENOUS at 06:20

## 2023-05-22 NOTE — PROGRESS NOTE ADULT - SUBJECTIVE AND OBJECTIVE BOX
NP Note discussed with  Primary Attending    Patient is a 82y old  Female who presents with a chief complaint of s/p Fall suspected UTI (21 May 2023 13:33)      INTERVAL HPI/OVERNIGHT EVENTS: no new complaints    MEDICATIONS  (STANDING):  enoxaparin Injectable 40 milliGRAM(s) SubCutaneous every 24 hours  lidocaine   4% Patch 1 Patch Transdermal daily  sodium chloride 0.9% lock flush 3 milliLiter(s) IV Push every 8 hours    MEDICATIONS  (PRN):  acetaminophen     Tablet .. 650 milliGRAM(s) Oral every 6 hours PRN Temp greater or equal to 38C (100.4F), Mild Pain (1 - 3)      __________________________________________________  REVIEW OF SYSTEMS:   [id number]  CONSTITUTIONAL: No fever,   EYES: no acute visual disturbances  NECK: No pain or stiffness  RESPIRATORY: No cough; No shortness of breath  CARDIOVASCULAR: No chest pain, no palpitations  GASTROINTESTINAL: No pain. No nausea or vomiting; No diarrhea   NEUROLOGICAL: No headache or numbness, no tremors  MUSCULOSKELETAL: No joint pain, no muscle pain, +leg pain  GENITOURINARY: no dysuria, no frequency, no hesitancy  PSYCHIATRY: no depression , no anxiety  ALL OTHER  ROS negative        Vital Signs Last 24 Hrs  T(C): 36.8 (22 May 2023 12:34), Max: 36.8 (22 May 2023 04:23)  T(F): 98.2 (22 May 2023 12:34), Max: 98.2 (22 May 2023 04:23)  HR: 73 (22 May 2023 12:34) (63 - 73)  BP: 109/61 (22 May 2023 12:34) (109/61 - 131/71)  BP(mean): --  RR: 16 (22 May 2023 12:34) (16 - 18)  SpO2: 98% (22 May 2023 12:34) (97% - 98%)    Parameters below as of 22 May 2023 12:34  Patient On (Oxygen Delivery Method): room air        ________________________________________________  PHYSICAL EXAM:  GENERAL: NAD, frail, cachectic  HEENT: Normocephalic;  conjunctivae and sclerae clear; moist mucous membranes;   NECK : supple  CHEST/LUNG: Clear to auscultation bilaterally with good air entry   HEART: S1 S2  regular  ABDOMEN: Soft, Nontender, Nondistended; Bowel sounds present  EXTREMITIES: no cyanosis; no edema; no calf tenderness  SKIN: warm and dry; no rash  NERVOUS SYSTEM:  Awake and alert; Oriented  to place, person and time ; no new deficits    _________________________________________________  LABS:                        10.6   4.50  )-----------( 236      ( 21 May 2023 04:49 )             33.8     05-22    141  |  114<H>  |  12  ----------------------------<  94  3.5   |  22  |  0.34<L>    Ca    8.8      22 May 2023 05:50  Phos  3.1     05-22  Mg     2.3     05-22          CAPILLARY BLOOD GLUCOSE            RADIOLOGY & ADDITIONAL TESTS:  < from: CT Lumbar Spine No Cont (05.21.23 @ 20:09) >    ACC: [id number] EXAM:  CT LUMBAR SPINE   ORDERED BY: RINKU PERLA     PROCEDURE DATE:  05/21/2023          INTERPRETATION:  CT lumbar spine without IV contrast    CLINICAL INFORMATION: Fall,   Back pain, spinal stenosis, spondylosis.    TECHNIQUE:  Contiguous axial 2 mm sections were obtained through the   lumbar spine using a single helical acquisition.   Additional 2 mm   sagittal and coronal reconstructions of the spine were obtained. These   additional reformatted images were used to evaluate the spine for   alignment, vertebral fractures and the integrity of the the posterior   elements.  This scan was performed using automatic exposure control   (radiation dose reduction software) to obtain a diagnostic image quality   scan with patient dose as low as reasonably achievable.    FINDINGS:   CT abdomen and pelvis dated 04/10/2021 is available for review    Lumbar vertebral body heights show interval acute compression fracture of   the superior endplate of L2 with loss of 25% of vertebral body height but   no significant bony retropulsion. Chronic compression fractures of L1 and   L3 are again noted unchanged. No vertebral fracture is seen. No   destructive bone lesion is found.  Alignment is significant for grade 1   posterior spondylolisthesis at L4-5 on a degenerative basis.  Facet   joints appear aligned.  The visualized sacral and pelvic bones appear   intact.    Lumbar intervertebral disc spaces show severe degenerative disc disease   and spondylosis at L4-5 and L5-S1 and mild disc degeneration at L2-3 and   L3-4 with loss of disc height and associated degenerative endplate   changes. Mild disc bulges are noted at L1-2, L2-3 and L5-S1 and severe   disc bulges are noted at L3-4 and L4-5 which flatten the ventral thecal   sac and narrow the BILATERAL neural foramina. Moderate central stenosis   at L3-4 and severe central stenosis at L4-5 on a degenerative basis due   to disc bulge, facet osteophytic hypertrophy and redundancy of ligamentum   flavum. Extruded L4-5disc herniation extending inferiorly posterior to   the L5 vertebral body. Small central and LEFT paracentral disc herniation   at L5-S1 with inferior extrusion.    No paraspinal mass is recognized.  Paraspinal soft tissues appear intact.      IMPRESSION:  Interval acute compression fracture of the superior endplate   of L2 with loss of 25% of vertebral body height but no significant bony   retropulsion. Chronic compression fractures of L1 and L3 are again noted   unchanged. Great 1 posterior spondylolisthesis at L4-5 on a degenerative   basis. Severe degenerative disc disease and spondylosis at L4-5 and L5-S1   and mild disc degeneration at L2-3 and L3-4 with mild disc bulges are   noted at L1-2, L2-3 and L5-S1 and severe disc bulges are noted at L3-4   and L4-5 which flatten the ventral thecal sac and narrow the BILATERAL   neural foramina. Moderate central stenosis at L3-4 and severe central   stenosis at L4-5 on a degenerative basis scratch. Extruded L4-5 disc   herniation extending inferiorly posterior to the L5 vertebral body. Small   central and LEFT paracentral disc herniation at L5-S1 with inferior   extrusion. Recommend MRI for more detailed evaluation.      --- End of Report ---            JING RUIZ MD; Attending Radiologist  This document has been electronically signed. May 21 2023 10:18PM    < end of copied text >  < from: CT Abdomen and Pelvis w/ IV Cont (05.19.23 @ 02:28) >    ACC: [id number] EXAM:  CT ABDOMEN AND PELVIS IC   ORDERED BY: JAMIA ESCOBAR     ACC: [id number] EXAM:  CT CHEST IC   ORDERED BY: JAMIA ESCOBAR     PROCEDURE DATE:  05/19/2023          INTERPRETATION:  CLINICAL INFORMATION: Pain status post fall    COMPARISON: April 6, 2021    CONTRAST/COMPLICATIONS:  IV Contrast: Omnipaque 350  90 cc administered   10 cc discarded  Oral Contrast: NONE  Complications: None reported at time of study completion    PROCEDURE:  CT of the Chest, Abdomen and Pelvis was performed.  Sagittal and coronal reformats were performed.    FINDINGS:  CHEST:  LUNGS AND LARGE AIRWAYS: Patent central airways. No pulmonary nodules.  Minimal bibasilar subsegmental atelectasis.  PLEURA: No pleural effusion.  VESSELS: Within normal limits.  HEART: Heart size is normal. No pericardial effusion.  MEDIASTINUM AND ADRIANO: No lymphadenopathy.  CHEST WALL AND LOWER NECK: Within normal limits.    ABDOMEN AND PELVIS:  LIVER: Within normal limits.  BILE DUCTS: Normal caliber.  GALLBLADDER: Within normal limits.  SPLEEN: Within normal limits.  PANCREAS: Within normal limits.  ADRENALS: Within normal limits.  KIDNEYS/URETERS: Within normal limits.    BLADDER: Moderately distended. Gas within the nondependent portion of   urinary bladder.  REPRODUCTIVE ORGANS: Supracervical hysterectomy.    There is a moderate amount of stool throughout the colon.    No acute traumatic visceral injuries are seen.    BOWEL: No bowel obstruction. Appendix is not visualized. No evidence of   inflammation in the pericecal region.  PERITONEUM: No ascites.  No free air or abscess.  VESSELS: Within normal limits.  RETROPERITONEUM/LYMPH NODES: No lymphadenopathy. No retroperitoneal   hemorrhage.  ABDOMINAL WALL: Within normal limits.  BONES: Degenerative changes. No lytic or blastic process. Mild stable   compression deformity L1 and L3. There is no evidence of new acute   fracture or dislocation. No focal subcutaneous soft tissue hematoma.    IMPRESSION:  No acute traumatic visceral injuries are seen.     Mild stable compression deformity L1 and L3. There is no evidence of new   acute fracture or dislocation. No focal subcutaneous soft tissue hematoma.    Please refer to detailed findings otherwise described above.    --- End of Report ---             DAVID BRAXTON MD; Attending Radiologist  This document has been electronically signed. May 19 2023  3:46AM    < end of copied text >    Imaging Personally Reviewed:  YES/NO    Consultant(s) Notes Reviewed:   YES/ No    Care Discussed with Consultants :     Plan of care was discussed with patient and /or primary care giver; all questions and concerns were addressed and care was aligned with patient's wishes.

## 2023-05-22 NOTE — PROGRESS NOTE ADULT - ASSESSMENT
82 year old female from home w/ hx of thyroid disease presenting with son after a fall. Patient does not remember fall but states that she was brought to the hospital by her son. Patient was found out of her bed on the ground. As per discussion with son and daughter in law, pt had multiple UTIs, recently in rehab for a month and got home 4 days ago. Has HHA until 9pm but then alone. Trouble walking recently and needs walker. Seems like pt fell and hit her help button with police coming to house and breaking door down. Son states pt usually is decently oriented. No recently complaints to family.  Urine cx (+) for E.coli, completed course of IV antibiotic  CTH is neg, CT Cspine showed no acute cervical fracture or traumatic malalignment. CT chest showed No acute traumatic visceral injuries are seen. Mild stable compression deformity L1 and L3. There is no evidence of new   acute fracture or dislocation. No focal subcutaneous soft tissue hematoma.  Pt was complaining of lower back pain and lower extremity pain. CT lumbar and spine showed interval acute compression fracture of the superior endplate of L2 with loss of 25% of vertebral body height but no significant bony   retropulsion. Dr. Cole was consulted  PT recs JENIFFER

## 2023-05-23 DIAGNOSIS — M54.9 DORSALGIA, UNSPECIFIED: ICD-10-CM

## 2023-05-23 DIAGNOSIS — S32.020A WEDGE COMPRESSION FRACTURE OF SECOND LUMBAR VERTEBRA, INITIAL ENCOUNTER FOR CLOSED FRACTURE: ICD-10-CM

## 2023-05-23 DIAGNOSIS — M54.50 LOW BACK PAIN, UNSPECIFIED: ICD-10-CM

## 2023-05-23 PROCEDURE — 99232 SBSQ HOSP IP/OBS MODERATE 35: CPT | Mod: FS

## 2023-05-23 PROCEDURE — 99222 1ST HOSP IP/OBS MODERATE 55: CPT

## 2023-05-23 RX ORDER — ACETAMINOPHEN 500 MG
650 TABLET ORAL EVERY 6 HOURS
Refills: 0 | Status: DISCONTINUED | OUTPATIENT
Start: 2023-05-23 | End: 2023-05-24

## 2023-05-23 RX ADMIN — LIDOCAINE 1 PATCH: 4 CREAM TOPICAL at 19:49

## 2023-05-23 RX ADMIN — ENOXAPARIN SODIUM 40 MILLIGRAM(S): 100 INJECTION SUBCUTANEOUS at 05:18

## 2023-05-23 RX ADMIN — LIDOCAINE 1 PATCH: 4 CREAM TOPICAL at 11:51

## 2023-05-23 RX ADMIN — LIDOCAINE 1 PATCH: 4 CREAM TOPICAL at 05:15

## 2023-05-23 RX ADMIN — LIDOCAINE 1 PATCH: 4 CREAM TOPICAL at 23:00

## 2023-05-23 RX ADMIN — SODIUM CHLORIDE 3 MILLILITER(S): 9 INJECTION INTRAMUSCULAR; INTRAVENOUS; SUBCUTANEOUS at 14:39

## 2023-05-23 RX ADMIN — SODIUM CHLORIDE 3 MILLILITER(S): 9 INJECTION INTRAMUSCULAR; INTRAVENOUS; SUBCUTANEOUS at 05:03

## 2023-05-23 NOTE — CONSULT NOTE ADULT - ASSESSMENT
Confidential Drug Utilization Report  Search Terms: Lolis Bustamante, 1940Search Date: 05/23/2023 14:15:40 PM  The Drug Utilization Report below displays all of the controlled substance prescriptions, if any, that your patient has filled in the last twelve months. The information displayed on this report is compiled from pharmacy submissions to the Department, and accurately reflects the information as submitted by the pharmacies.    This report was requested by: Denisse Bennett | Reference #: [id number]    There are no results for the search terms that you entered.

## 2023-05-23 NOTE — PROGRESS NOTE ADULT - PROBLEM SELECTOR PLAN 2
CT noted above  Conservative mgmt  Pain control per Pain NP  Neurosurg following  Pain Mgmt following  Recommendations appreciated
p/w after a fall, had multiple UTIs in the past  - completed course of ceftriaxone  - urine culture (+) E.coli

## 2023-05-23 NOTE — PROGRESS NOTE ADULT - PROBLEM SELECTOR PLAN 6
Medically stable for discharge  PT recommends JENIFFER, family wants pt to return home  Care management following

## 2023-05-23 NOTE — PROGRESS NOTE ADULT - PROBLEM SELECTOR PLAN 4
Resolved
p/w s/p fall  - likely due to age-deconditioning  - CT lumbar and spine showed interval acute compression fracture of the superior endplate of L2 with loss of 25% of vertebral body height but no significant bony retropulsion. Dr. Cole was consulted  - PT consult recs JENIFFER  - fall precautions

## 2023-05-23 NOTE — PROGRESS NOTE ADULT - PROBLEM SELECTOR PLAN 1
CT noted above  PT recs JENIFFER  Family wants pt to return home
p/w after a fall  - had multiple UTI's in the past  - urine cx (+) E.coli  - completed course of ceftriaxone  - back to baseline mental status

## 2023-05-23 NOTE — PROGRESS NOTE ADULT - NS ATTEND AMEND GEN_ALL_CORE FT
Patient with back pain, unable to get out of bed. Lumbar CT reviewed with acute compression fracture and multiple hernias. Pending neurosurgery consult for review. PT recommend JENIFFER, but son declined. Would re-discuss after neurosurgery consultation. Completed treatment for UTI. Lidocaine patch for fracture.
Patient was seen and examined at bedside. AAOx1, Denies any pain or any other complains     Vitals noted     P/E:  NAD  AAOx3, no focal deficit   CTABL  S1S2 WNL  Abd soft, non tender, BS present  Unable to rotate to examine back   BLLE no edema or calf tenderness    Labs noted     A/P:  Neurosurgery recommendations noted. Conservative management, pain control and outpatient follow up. Patient does not seem to be in pain. PT eval JENIFFER.   Completed treatment for UTI with Ceftriaxone, urine culture E.coli sensitive to Ceftriaxone  Family wants to take patient homw  CM and SW for safe discharge

## 2023-05-23 NOTE — ADVANCED PRACTICE NURSE CONSULT - ASSESSMENT
This is a 82yr old female patient admitted for UTI, presenting with a Stage 1 Pressure Injury to the Coccyx, as evident by non-blanchable erythema

## 2023-05-23 NOTE — ADVANCED PRACTICE NURSE CONSULT - RECOMMEDATIONS
-Apply a foam dressing to the Coccyx area Q 72hrs PRN  -Elevate/float the patients heels using heel protectors and reposition the patient Q 2hrs using wedges or pillows

## 2023-05-23 NOTE — PROGRESS NOTE ADULT - SUBJECTIVE AND OBJECTIVE BOX
HPI:  82 year old female from home w/ hx of thyroid disease presenting with son to ED after a fall. Patient does not remember fall but states that she was brought to the hospital by her son. Per ED sign out patient was found out of her bed on the ground. Patient denies any current symptoms including fevers, chills, headaches, dizziness, chest pain, cough, SOB, abd pain, n/v, diarrhea, constipation hematuria, dysuria, sick contacts, recent travel.    As per discussion with son and daughter in law, pt had multiple UTIs, recently in rehab for a month and got home 4 days ago. Has HHA until 9pm but then alone. Trouble walking recently and needs walker. Seems like pt fell and hit her help button with police coming to house and breaking door down. Son states pt usually is decently oriented. No recently complaints to family.     In ED VS: T 97.5, , /63, RR 18, spo2 96%RA  UA +  ct head spine abd and pelvis negative  s/p 1 dose Rocephin and 1LNS  (19 May 2023 05:51)      OVERNIGHT EVENTS:        REVIEW OF SYSTEMS:      CONSTITUTIONAL: No fever  EYES: no acute visual disturbances  NECK: No pain or stiffness  RESPIRATORY: No cough; No shortness of breath  CARDIOVASCULAR: No chest pain, no palpitations  GASTROINTESTINAL: No pain. No nausea, vomiting or diarrhea   NEUROLOGICAL: No headache or numbness, no tremors  MUSCULOSKELETAL: No joint pain, no muscle pain  GENITOURINARY: no dysuria, no frequency, no hesitancy  PSYCHIATRY: no depression, no anxiety  ALL OTHER  ROS negative        Vital Signs Last 24 Hrs  T(C): 36.2 (23 May 2023 11:54), Max: 36.7 (23 May 2023 05:25)  T(F): 97.1 (23 May 2023 11:54), Max: 98.1 (23 May 2023 05:25)  HR: 78 (23 May 2023 11:54) (67 - 78)  BP: 107/58 (23 May 2023 11:54) (105/53 - 110/53)  BP(mean): 63 (22 May 2023 20:26) (63 - 63)  RR: 17 (23 May 2023 11:54) (17 - 18)  SpO2: 98% (23 May 2023 11:54) (97% - 98%)    Parameters below as of 23 May 2023 11:54  Patient On (Oxygen Delivery Method): room air        ________________________________________________  PHYSICAL EXAM:    GENERAL: NAD  HEENT: Normocephalic; conjunctivae and sclerae clear;  NECK : supple, no JVD  CHEST/LUNG: Clear to auscultation; Nonlabored  HEART: S1 S2  regular  ABDOMEN: Soft, Nontender, Nondistended; Bowel sounds present  EXTREMITIES: no cyanosis; no LE edema; no calf tenderness  NERVOUS SYSTEM:  Alert; no new deficits  SKIN: Stage 1 Pressure Injury to the Coccyx    _________________________________________________  CURRENT MEDICATIONS:    MEDICATIONS  (STANDING):  enoxaparin Injectable 40 milliGRAM(s) SubCutaneous every 24 hours  lidocaine   4% Patch 1 Patch Transdermal daily  sodium chloride 0.9% lock flush 3 milliLiter(s) IV Push every 8 hours    MEDICATIONS  (PRN):  acetaminophen     Tablet .. 650 milliGRAM(s) Oral every 6 hours PRN Moderate Pain (4 - 6)      __________________________________________________  LABS:      05-22    141  |  114<H>  |  12  ----------------------------<  94  3.5   |  22  |  0.34<L>    Ca    8.8      22 May 2023 05:50  Phos  3.1     05-22  Mg     2.3     05-22          CAPILLARY BLOOD GLUCOSE          __________________________________________________  RADIOLOGY & ADDITIONAL TESTS:    Imaging Personally Reviewed:  YES    < from: CT Lumbar Spine No Cont (05.21.23 @ 20:09) >  IMPRESSION:  Interval acute compression fracture of the superior endplate   of L2 with loss of 25% of vertebral body height but no significant bony   retropulsion. Chronic compression fractures of L1 and L3 are again noted   unchanged. Great 1 posterior spondylolisthesis at L4-5 on a degenerative   basis. Severe degenerative disc disease and spondylosis at L4-5 and L5-S1   and mild disc degeneration at L2-3 and L3-4 with mild disc bulges are   noted at L1-2, L2-3 and L5-S1 and severe disc bulges are noted at L3-4   and L4-5 which flatten the ventral thecal sac and narrow the BILATERAL   neural foramina. Moderate central stenosis at L3-4 and severe central   stenosis at L4-5 on a degenerative basis scratch. Extruded L4-5 disc   herniation extending inferiorly posterior to the L5 vertebral body. Small   central and LEFT paracentral disc herniation at L5-S1 with inferior   extrusion. Recommend MRI for more detailed evaluation.      < end of copied text >    Consultant(s) Notes Reviewed:   YES     Plan of care was discussed with patient and /or primary care giver; all questions and concerns were addressed and care was aligned with patient's wishes.    Plan discussed with attending and consulting physicians.

## 2023-05-23 NOTE — PROGRESS NOTE ADULT - PROBLEM SELECTOR PLAN 3
p/w s/p fall  - orthostatic neg lying and sitting however, unable to stand due to lower extremities pain  - PT consult recs [facility]ENIFFER  - fall precautions
Urine culture grew E.coli  Completed course of ceftriaxone

## 2023-05-23 NOTE — CONSULT NOTE ADULT - PROBLEM SELECTOR RECOMMENDATION 9
Pt denies back pain at time of assessment. Dx with acute L2 compression fracture. Hx DDD, chronic L1, L3 compression fractures,  lumbar disc bulges including severe disc bulges are noted at L3-4 and L4-5.  Seen by ortho, recommend conservative management and follow up outpatient.   High risk medications reviewed. Avoid polypharmacy. Avoid IV opioids. Avoid NSAIDs and benzodiazepines. Non-pharmacological sleep aides initiated. Non-opioid medications and non-pharmacological pain management measures initiated.   Non-opioid pain recommendations   - Acetaminophen 650mg PO q 6 hours PRN moderate pain. Monitor LFTs  - Lidoderm 4% patch daily.   Bowel Regimen  - n/a not on opioids   Mild pain (score 1-3)  - Non-pharmacological pain treatment recommendations  - Warm/ Cool packs PRN   - Repositioning extremity, elevation, imagery, relaxation, distraction.  - Physical therapy OOB if no contraindications   Recommendations discussed with primary team and RN

## 2023-05-23 NOTE — CONSULT NOTE ADULT - SUBJECTIVE AND OBJECTIVE BOX
Pt Name: ANDERSON KOWALSKI  MRN: [id number]      Neurosurgery SPINE CONSULT    Diagnosis:     82yFemale HPI:  82 year old female from home w/ hx of thyroid disease presenting with son to ED after a fall. Patient does not remember fall but states that she was brought to the hospital by her son. Per ED sign out patient was found out of her bed on the ground. Patient denies any current symptoms including fevers, chills, headaches, dizziness, chest pain, cough, SOB, abd pain, n/v, diarrhea, constipation hematuria, dysuria, sick contacts, recent travel.    As per discussion with son and daughter in law, pt had multiple UTIs, recently in rehab for a month and got home 4 days ago. Has HHA until 9pm but then alone. Trouble walking recently and needs walker. Seems like pt fell and hit her help button with police coming to house and breaking door down. Son states pt usually is decently oriented. No recently complaints to family.     In ED VS: T 97.5, , /63, RR 18, spo2 96%RA  UA +  ct head spine abd and pelvis negative  s/p 1 dose Rocephin and 1LNS  (19 May 2023 05:51)      Patient was seen and evaluated at bedside today. Patient experienced a ground level fall on 5/18 at home. Patient states that she has mild lower back pain. Patient rates the pain a 3/10 and states that the pain is well controlled with medication and alleviated when laying down. Patient denies any radiation of the pain to lower extremities. Pt denies Fever, Chest pain, SOB, dyspnea, paresthesias, N/V/D, abdominal pain, syncope, or pain anywhere else.      [  xxx   ] bladder/bowel incontinence or changes  [  xxx  ] saddle-like paresthesias    Ambulation:   [      ]    Walking independently   [      ]    With Cane   [ xxx ]    With Walker   [      ]    Bed / wheelchairbound     PAST MEDICAL & SURGICAL HISTORY:  No pertinent past medical history      No significant past surgical history      Vital Signs Last 24 Hrs  T(C): 36.7 (23 May 2023 05:25), Max: 36.8 (22 May 2023 12:34)  T(F): 98.1 (23 May 2023 05:25), Max: 98.2 (22 May 2023 12:34)  HR: 67 (23 May 2023 05:25) (67 - 73)  BP: 110/53 (23 May 2023 05:25) (105/53 - 110/53)  BP(mean): 63 (22 May 2023 20:26) (63 - 63)  RR: 18 (23 May 2023 05:25) (16 - 18)  SpO2: 97% (23 May 2023 05:25) (97% - 98%)    Parameters below as of 23 May 2023 05:25  Patient On (Oxygen Delivery Method): room air        Physical Exam:  General; Awake and alert, Oriented x 3  Hips/Pelvis:   Able to SLR bilaterally. Negative log roll, heel strike. No pain on passive Int/Ext hip rotation.  Spine exam:  Neck: supple, NT, Full Painless baseline AROM  Back: Mild lower back tenderness upon palpation    Extremities:          -Left Upper Extremity: Atraumatic with normal alignment NROM. No crepitus. No bony tenderness.      -Right Upper Extremity: Atraumatic with normal alignment NROM. No crepitus. No bony tenderness.      -Left Lower Extremity: Atraumatic with normal alignment NROM. No crepitus. No bony tenderness. No calf tenderness, Calf soft.     -Right Lower Extremity: Atraumatic with normal alignment NROM. No crepitus. No bony tenderness.  No calf tenderness, Calf soft.         >Sensation:  intact to light touch           >Motor exam:          >Bilateral Upper extremities         Delt      Bicep      Tri      Wrist ext  Wrst Flex       Digit Ext Digit Flex                                                   R         5/5        5/5        5/5       5/5            5/5            5/5       5/5          5/5                                                   L          5/5        5/5        5/5       5/5            5/5            5/5       5/5          5/5         >Bilateral Lower ext.          Hip Flx  Hip Ext    Quad   Hamstrg   TA       EHL      GS                                          R        5/5        5/5        5/5        5/5          5/5     5/5      5/5                                          L         5/5        5/5        5/5        5/5          5/5     5/5      5/5      Labs:    05-22    141  |  114<H>  |  12  ----------------------------<  94  3.5   |  22  |  0.34<L>    Ca    8.8      22 May 2023 05:50  Phos  3.1     05-22  Mg     2.3     05-22        Radiology: < from: CT Lumbar Spine No Cont (05.21.23 @ 20:09) >  IMPRESSION:  Interval acute compression fracture of the superior endplate   of L2 with loss of 25% of vertebral body height but no significant bony   retropulsion. Chronic compression fractures of L1 and L3 are again noted   unchanged. Great 1 posterior spondylolisthesis at L4-5 on a degenerative   basis. Severe degenerative disc disease and spondylosis at L4-5 and L5-S1   and mild disc degeneration at L2-3 and L3-4 with mild disc bulges are   noted at L1-2, L2-3 and L5-S1 and severe disc bulges are noted at L3-4   and L4-5 which flatten the ventral thecal sac and narrow the BILATERAL   neural foramina. Moderate central stenosis at L3-4 and severe central   stenosis at L4-5 on a degenerative basis scratch. Extruded L4-5 disc   herniation extending inferiorly posterior to the L5 vertebral body. Small   central and LEFT paracentral disc herniation at L5-S1 with inferior   extrusion. Recommend MRI for more detailed evaluation.    < end of copied text >        Impression:  Pt is a  82y Female with acute compression fx of L2     Plan:  - Recommendation: Conservative treatment  - Pain management  - DVT ppx with venodynes  - PT- WBAT of the lower extremities. Proper body mechanics.  - Case d/w Dr. Cole  - Follow up with [facility] in office in one week at [phone]  
  Source of information: ANDERSON KOWALSKI, Chart review  Patient language: Vietnamese  : Rigoberto # [id number]     HPI:  82 year old female from home w/ hx of thyroid disease presenting with son to ED after a fall. Patient does not remember fall but states that she was brought to the hospital by her son. Per ED sign out patient was found out of her bed on the ground. Patient denies any current symptoms including fevers, chills, headaches, dizziness, chest pain, cough, SOB, abd pain, n/v, diarrhea, constipation hematuria, dysuria, sick contacts, recent travel.    As per discussion with son and daughter in law, pt had multiple UTIs, recently in rehab for a month and got home 4 days ago. Has HHA until 9pm but then alone. Trouble walking recently and needs walker. Seems like pt fell and hit her help button with police coming to house and breaking door down. Son states pt usually is decently oriented. No recently complaints to family.     In ED VS: T 97.5, , /63, RR 18, spo2 96%RA  UA +  ct head spine abd and pelvis negative  s/p 1 dose Rocephin and 1LNS  (19 May 2023 05:51)    CT lumbar- Interval acute compression fracture of the superior endplate of L2 with loss of 25% of vertebral body height but no significant bony retropulsion. Chronic compression fractures of L1 and L3 are again noted unchanged. Great 1 posterior spondylolisthesis at L4-5 on a degenerative basis. Severe degenerative disc disease and spondylosis at L4-5 and L5-S1 and mild disc degeneration at L2-3 and L3-4 with mild disc bulges are noted at L1-2, L2-3 and L5-S1 and severe disc bulges are noted at L3-4 and L4-5 which flatten the ventral thecal sac and narrow the BILATERAL neural foramina. Moderate central stenosis at L3-4 and severe central stenosis at L4-5 on a degenerative basis scratch. Extruded L4-5 disc herniation extending inferiorly posterior to the L5 vertebral body. Small central and LEFT paracentral disc herniation at L5-S1 with inferior extrusion. Recommend MRI for more detailed evaluation.    Pt is admitted for s/p fall. Found to have acute l2 compression fx. Seen by ortho, recommend conservative management and follow up outpatient. Pt also was treated for UTI. Pain consulted 5/23 for back pain. Pt seen and examined at bedside. Pt laying in bed, denies pain. Visualized moving in bed, turning, and lifting legs without pain SCALE USED: (1-10 VNRS). Pt tolerating PO diet. Denies lethargy, chest pain, SOB, abdominal pain, nausea, vomiting, constipation. Reports last BM 5/20. Patient stated goal for pain control: to be able to take deep breaths, get out of bed to chair and ambulate with tolerable pain control.  Denies taking pain medications at home for pain. PT recommend JENIFFER.     PAST MEDICAL & SURGICAL HISTORY:  No pertinent past medical history      No significant past surgical history          FAMILY HISTORY:      Social History:  denies any smoking etoh or drug use. (19 May 2023 05:51)    Allergies    No Known Allergies  MEDICATIONS  (STANDING):  enoxaparin Injectable 40 milliGRAM(s) SubCutaneous every 24 hours  lidocaine   4% Patch 1 Patch Transdermal daily  sodium chloride 0.9% lock flush 3 milliLiter(s) IV Push every 8 hours    MEDICATIONS  (PRN):  acetaminophen     Tablet .. 650 milliGRAM(s) Oral every 6 hours PRN Temp greater or equal to 38C (100.4F), Mild Pain (1 - 3)      Vital Signs Last 24 Hrs  T(C): 36.2 (23 May 2023 11:54), Max: 36.7 (23 May 2023 05:25)  T(F): 97.1 (23 May 2023 11:54), Max: 98.1 (23 May 2023 05:25)  HR: 78 (23 May 2023 11:54) (67 - 78)  BP: 107/58 (23 May 2023 11:54) (105/53 - 110/53)  BP(mean): 63 (22 May 2023 20:26) (63 - 63)  RR: 17 (23 May 2023 11:54) (17 - 18)  SpO2: 98% (23 May 2023 11:54) (97% - 98%)    Parameters below as of 23 May 2023 11:54  Patient On (Oxygen Delivery Method): room air        LABS: Reviewed.      05-22    141  |  114<H>  |  12  ----------------------------<  94  3.5   |  22  |  0.34<L>    Ca    8.8      22 May 2023 05:50  Phos  3.1     05-22  Mg     2.3     05-22    Radiology: Reviewed.   < from: CT Lumbar Spine No Cont (05.21.23 @ 20:09) >    ACC: [id number] EXAM:  CT LUMBAR SPINE   ORDERED BY: RINKU PERLA     PROCEDURE DATE:  05/21/2023          INTERPRETATION:  CT lumbar spine without IV contrast    CLINICAL INFORMATION: Fall,   Back pain, spinal stenosis, spondylosis.    TECHNIQUE:  Contiguous axial 2 mm sections were obtained through the   lumbar spine using a single helical acquisition.   Additional 2 mm   sagittal and coronal reconstructions of the spine were obtained. These   additional reformatted images were used to evaluate the spine for   alignment, vertebral fractures and the integrity of the the posterior   elements.  This scan was performed using automatic exposure control   (radiation dose reduction software) to obtain a diagnostic image quality   scan with patient dose as low as reasonably achievable.    FINDINGS:   CT abdomen and pelvis dated 04/10/2021 is available for review    Lumbar vertebral body heights show interval acute compression fracture of   the superior endplate of L2 with loss of 25% of vertebral body height but   no significant bony retropulsion. Chronic compression fractures of L1 and   L3 are again noted unchanged. No vertebral fracture is seen. No   destructive bone lesion is found.  Alignment is significant for grade 1   posterior spondylolisthesis at L4-5 on a degenerative basis.  Facet   joints appear aligned.  The visualized sacral and pelvic bones appear   intact.    Lumbar intervertebral disc spaces show severe degenerative disc disease   and spondylosis at L4-5 and L5-S1 and mild disc degeneration at L2-3 and   L3-4 with loss of disc height and associated degenerative endplate   changes. Mild disc bulges are noted at L1-2, L2-3 and L5-S1 and severe   disc bulges are noted at L3-4 and L4-5 which flatten the ventral thecal   sac and narrow the BILATERAL neural foramina. Moderate central stenosis   at L3-4 and severe central stenosis at L4-5 on a degenerative basis due   to disc bulge, facet osteophytic hypertrophy and redundancy of ligamentum   flavum. Extruded L4-5disc herniation extending inferiorly posterior to   the L5 vertebral body. Small central and LEFT paracentral disc herniation   at L5-S1 with inferior extrusion.    No paraspinal mass is recognized.  Paraspinal soft tissues appear intact.      IMPRESSION:  Interval acute compression fracture of the superior endplate   of L2 with loss of 25% of vertebral body height but no significant bony   retropulsion. Chronic compression fractures of L1 and L3 are again noted   unchanged. Great 1 posterior spondylolisthesis at L4-5 on a degenerative   basis. Severe degenerative disc disease and spondylosis at L4-5 and L5-S1   and mild disc degeneration at L2-3 and L3-4 with mild disc bulges are   noted at L1-2, L2-3 and L5-S1 and severe disc bulges are noted at L3-4   and L4-5 which flatten the ventral thecal sac and narrow the BILATERAL   neural foramina. Moderate central stenosis at L3-4 and severe central   stenosis at L4-5 on a degenerative basis scratch. Extruded L4-5 disc   herniation extending inferiorly posterior to the L5 vertebral body. Small   central and LEFT paracentral disc herniation at L5-S1 with inferior   extrusion. Recommend MRI for more detailed evaluation.      --- End of Report ---            JING RUIZ MD; Attending Radiologist  This document has been electronically signed. May 21 2023 10:18PM    < end of copied text >    < from: CT Abdomen and Pelvis w/ IV Cont (05.19.23 @ 02:28) >    ACC: [id number] EXAM:  CT ABDOMEN AND PELVIS IC   ORDERED BY: JAMIA ESCOBAR     ACC: [id number] EXAM:  CT CHEST IC   ORDERED BY: JAMIA ESCOBAR     PROCEDURE DATE:  05/19/2023          INTERPRETATION:  CLINICAL INFORMATION: Pain status post fall    COMPARISON: April 6, 2021    CONTRAST/COMPLICATIONS:  IV Contrast: Omnipaque 350  90 cc administered   10 cc discarded  Oral Contrast: NONE  Complications: None reported at time of study completion    PROCEDURE:  CT of the Chest, Abdomen and Pelvis was performed.  Sagittal and coronal reformats were performed.    FINDINGS:  CHEST:  LUNGS AND LARGE AIRWAYS: Patent central airways. No pulmonary nodules.  Minimal bibasilar subsegmental atelectasis.  PLEURA: No pleural effusion.  VESSELS: Within normal limits.  HEART: Heart size is normal. No pericardial effusion.  MEDIASTINUM AND ADRIANO: No lymphadenopathy.  CHEST WALL AND LOWER NECK: Within normal limits.    ABDOMEN AND PELVIS:  LIVER: Within normal limits.  BILE DUCTS: Normal caliber.  GALLBLADDER: Within normal limits.  SPLEEN: Within normal limits.  PANCREAS: Within normal limits.  ADRENALS: Within normal limits.  KIDNEYS/URETERS: Within normal limits.    BLADDER: Moderately distended. Gas within the nondependent portion of   urinary bladder.  REPRODUCTIVE ORGANS: Supracervical hysterectomy.    There is a moderate amount of stool throughout the colon.    No acute traumatic visceral injuries are seen.    BOWEL: No bowel obstruction. Appendix is not visualized. No evidence of   inflammation in the pericecal region.  PERITONEUM: No ascites.  No free air or abscess.  VESSELS: Within normal limits.  RETROPERITONEUM/LYMPH NODES: No lymphadenopathy. No retroperitoneal   hemorrhage.  ABDOMINAL WALL: Within normal limits.  BONES: Degenerative changes. No lytic or blastic process. Mild stable   compression deformity L1 and L3. There is no evidence of new acute   fracture or dislocation. No focal subcutaneous soft tissue hematoma.    IMPRESSION:  No acute traumatic visceral injuries are seen.     Mild stable compression deformity L1 and L3. There is no evidence of new   acute fracture or dislocation. No focal subcutaneous soft tissue hematoma.    Please refer to detailed findings otherwise described above.    --- End of Report ---             DAVID BRAXTON MD; Attending Radiologist  This document has been electronically signed. May 19 2023  3:46AM    < end of copied text >    ORT Score -   Family Hx of substance abuse	Female	      Male  Alcohol 	                                           1                     3  Illegal drugs	                                   2                     3  Rx drugs                                           4 	                  4  Personal Hx of substance abuse		  Alcohol 	                                          3	                  3  Illegal drugs                                     4	                  4  Rx drugs                                            5 	                  5  Age between 16- 45 years	           1                     1  hx preadolescent sexual abuse	   3 	                  0  Psychological disease		  ADD, OCD, bipolar, schizophrenia   2	          2  Depression                                           1 	          1  Total: 0    a score of 3 or lower indicates low risk for opioid abuse		  a score of 4-7 indicates moderate risk for opioid abuse		  a score of 8 or higher indicates high risk for opioid abuse    REVIEW OF SYSTEMS:  CONSTITUTIONAL: No fever or fatigue  HEENT:  +difficulty hearing, no change in vision  RESPIRATORY: No cough, wheezing, chills or hemoptysis; No shortness of breath  CARDIOVASCULAR: No chest pain, palpitations, dizziness, or leg swelling  GASTROINTESTINAL: No loss of appetite, decreased PO intake. No abdominal or epigastric pain. No nausea, vomiting; No diarrhea or constipation.   GENITOURINARY: No dysuria, frequency, hematuria, retention or incontinence  MUSCULOSKELETAL: No joint pain or swelling; No back pain, no upper or lower motor strength weakness, no saddle anesthesia, bowel/bladder incontinence, + falls   NEURO: No headaches, No numbness/tingling b/l LE     PHYSICAL EXAM:  GENERAL:  Alert & Oriented X2 to name place, cooperative, follows commands, NAD, Good concentration. Speech is clear.   RESPIRATORY: Respirations even and unlabored. Clear to auscultation bilaterally; No rales, rhonchi, wheezing, or rubs  CARDIOVASCULAR: Normal S1/S2, regular rate and rhythm; No murmurs, rubs, or gallops. No JVD.   GASTROINTESTINAL:  Soft, Nontender, Nondistended; Bowel sounds present  PERIPHERAL VASCULAR:  Extremities warm without edema. 2+ Peripheral Pulses, No cyanosis, No calf tenderness  MUSCULOSKELETAL: Motor Strength 4/5 B/L upper and lower extremities; moves all extremities equally against gravity; ROM intact; negative SLR; No tenderness on palpation of all joints and back.    SKIN: Warm, dry, intact. No rashes, lesions, scars or wounds. + left facial ecchymosis     Risk factors associated with adverse outcomes related to opioid treatment  [ ]  Concurrent benzodiazepine use  [ ]  History/ Active substance use or alcohol use disorder  [ ] Psychiatric co-morbidity  [ ] Sleep apnea  [ ] COPD  [ ] BMI> 35  [ ] Liver dysfunction  [ ] Renal dysfunction  [ ] CHF  [ ] Smoker  [X ]  Age > 60 years    [X ]  [place]  Reviewed and Copied to Chart. See below.    Plan of care and goal oriented pain management treatment options were discussed with patient and /or primary care giver; all questions and concerns were addressed and care was aligned with patient's wishes.    Educated patient on goal oriented pain management treatment options

## 2023-05-24 VITALS
HEART RATE: 78 BPM | RESPIRATION RATE: 17 BRPM | DIASTOLIC BLOOD PRESSURE: 55 MMHG | SYSTOLIC BLOOD PRESSURE: 106 MMHG | TEMPERATURE: 97 F | OXYGEN SATURATION: 98 %

## 2023-05-24 PROCEDURE — 84484 ASSAY OF TROPONIN QUANT: CPT

## 2023-05-24 PROCEDURE — 85730 THROMBOPLASTIN TIME PARTIAL: CPT

## 2023-05-24 PROCEDURE — 96374 THER/PROPH/DIAG INJ IV PUSH: CPT

## 2023-05-24 PROCEDURE — 83690 ASSAY OF LIPASE: CPT

## 2023-05-24 PROCEDURE — 82962 GLUCOSE BLOOD TEST: CPT

## 2023-05-24 PROCEDURE — 85652 RBC SED RATE AUTOMATED: CPT

## 2023-05-24 PROCEDURE — 87641 MR-STAPH DNA AMP PROBE: CPT

## 2023-05-24 PROCEDURE — 87086 URINE CULTURE/COLONY COUNT: CPT

## 2023-05-24 PROCEDURE — 72125 CT NECK SPINE W/O DYE: CPT | Mod: MA

## 2023-05-24 PROCEDURE — 71260 CT THORAX DX C+: CPT | Mod: MA

## 2023-05-24 PROCEDURE — 85027 COMPLETE CBC AUTOMATED: CPT

## 2023-05-24 PROCEDURE — 85025 COMPLETE CBC W/AUTO DIFF WBC: CPT

## 2023-05-24 PROCEDURE — 82550 ASSAY OF CK (CPK): CPT

## 2023-05-24 PROCEDURE — 72131 CT LUMBAR SPINE W/O DYE: CPT

## 2023-05-24 PROCEDURE — 70450 CT HEAD/BRAIN W/O DYE: CPT | Mod: MA

## 2023-05-24 PROCEDURE — 84443 ASSAY THYROID STIM HORMONE: CPT

## 2023-05-24 PROCEDURE — 82533 TOTAL CORTISOL: CPT

## 2023-05-24 PROCEDURE — 36415 COLL VENOUS BLD VENIPUNCTURE: CPT

## 2023-05-24 PROCEDURE — 83735 ASSAY OF MAGNESIUM: CPT

## 2023-05-24 PROCEDURE — 87186 SC STD MICRODIL/AGAR DIL: CPT

## 2023-05-24 PROCEDURE — 86431 RHEUMATOID FACTOR QUANT: CPT

## 2023-05-24 PROCEDURE — 81001 URINALYSIS AUTO W/SCOPE: CPT

## 2023-05-24 PROCEDURE — 99285 EMERGENCY DEPT VISIT HI MDM: CPT | Mod: 25

## 2023-05-24 PROCEDURE — 87637 SARSCOV2&INF A&B&RSV AMP PRB: CPT

## 2023-05-24 PROCEDURE — 93005 ELECTROCARDIOGRAM TRACING: CPT

## 2023-05-24 PROCEDURE — 74177 CT ABD & PELVIS W/CONTRAST: CPT | Mod: MA

## 2023-05-24 PROCEDURE — 80053 COMPREHEN METABOLIC PANEL: CPT

## 2023-05-24 PROCEDURE — 87640 STAPH A DNA AMP PROBE: CPT

## 2023-05-24 PROCEDURE — 97162 PT EVAL MOD COMPLEX 30 MIN: CPT

## 2023-05-24 PROCEDURE — 80048 BASIC METABOLIC PNL TOTAL CA: CPT

## 2023-05-24 PROCEDURE — 86140 C-REACTIVE PROTEIN: CPT

## 2023-05-24 PROCEDURE — 99239 HOSP IP/OBS DSCHRG MGMT >30: CPT

## 2023-05-24 PROCEDURE — 85610 PROTHROMBIN TIME: CPT

## 2023-05-24 PROCEDURE — 84100 ASSAY OF PHOSPHORUS: CPT

## 2023-05-24 RX ORDER — LIDOCAINE 4 G/100G
1 CREAM TOPICAL
Qty: 1 | Refills: 0
Start: 2023-05-24 | End: 2023-05-30

## 2023-05-24 RX ORDER — ACETAMINOPHEN 500 MG
2 TABLET ORAL
Qty: 14 | Refills: 0
Start: 2023-05-24

## 2023-05-24 RX ADMIN — ENOXAPARIN SODIUM 40 MILLIGRAM(S): 100 INJECTION SUBCUTANEOUS at 05:39

## 2023-08-01 ENCOUNTER — INPATIENT (INPATIENT)
Facility: HOSPITAL | Age: 83
LOS: 5 days | Discharge: HOME | DRG: 689 | End: 2023-08-07
Attending: HOSPITALIST | Admitting: HOSPITALIST
Payer: COMMERCIAL

## 2023-08-01 VITALS
HEART RATE: 85 BPM | TEMPERATURE: 97 F | HEIGHT: 63 IN | DIASTOLIC BLOOD PRESSURE: 86 MMHG | OXYGEN SATURATION: 95 % | SYSTOLIC BLOOD PRESSURE: 129 MMHG | RESPIRATION RATE: 20 BRPM

## 2023-08-01 PROCEDURE — 99285 EMERGENCY DEPT VISIT HI MDM: CPT | Mod: 25

## 2023-08-01 NOTE — ED ADULT TRIAGE NOTE - HEIGHT IN INCHES
Negative COVID. Letter sent.    Component      Latest Ref Rng & Units 6/10/2022   SARS CoV 2 Qualitative RT PCR       Not Detected 3

## 2023-08-02 DIAGNOSIS — G93.49 OTHER ENCEPHALOPATHY: ICD-10-CM

## 2023-08-02 DIAGNOSIS — N39.0 URINARY TRACT INFECTION, SITE NOT SPECIFIED: ICD-10-CM

## 2023-08-02 DIAGNOSIS — Z29.9 ENCOUNTER FOR PROPHYLACTIC MEASURES, UNSPECIFIED: ICD-10-CM

## 2023-08-02 DIAGNOSIS — F03.90 UNSPECIFIED DEMENTIA WITHOUT BEHAVIORAL DISTURBANCE: ICD-10-CM

## 2023-08-02 DIAGNOSIS — G93.41 METABOLIC ENCEPHALOPATHY: ICD-10-CM

## 2023-08-02 DIAGNOSIS — N30.00 ACUTE CYSTITIS WITHOUT HEMATURIA: ICD-10-CM

## 2023-08-02 LAB
ALBUMIN SERPL ELPH-MCNC: 3.4 G/DL — LOW (ref 3.5–5)
ALP SERPL-CCNC: 75 U/L — SIGNIFICANT CHANGE UP (ref 40–120)
ALT FLD-CCNC: 24 U/L DA — SIGNIFICANT CHANGE UP (ref 10–60)
ANION GAP SERPL CALC-SCNC: 8 MMOL/L — SIGNIFICANT CHANGE UP (ref 5–17)
APPEARANCE UR: ABNORMAL
APTT BLD: 28.7 SEC — SIGNIFICANT CHANGE UP (ref 24.5–35.6)
AST SERPL-CCNC: 23 U/L — SIGNIFICANT CHANGE UP (ref 10–40)
B PERT DNA SPEC QL NAA+PROBE: SIGNIFICANT CHANGE UP
BACTERIA # UR AUTO: ABNORMAL /HPF
BASOPHILS # BLD AUTO: 0.02 K/UL — SIGNIFICANT CHANGE UP (ref 0–0.2)
BASOPHILS NFR BLD AUTO: 0.3 % — SIGNIFICANT CHANGE UP (ref 0–2)
BILIRUB SERPL-MCNC: 0.3 MG/DL — SIGNIFICANT CHANGE UP (ref 0.2–1.2)
BILIRUB UR-MCNC: NEGATIVE — SIGNIFICANT CHANGE UP
BUN SERPL-MCNC: 16 MG/DL — SIGNIFICANT CHANGE UP (ref 7–18)
C PNEUM DNA SPEC QL NAA+PROBE: SIGNIFICANT CHANGE UP
CALCIUM SERPL-MCNC: 8.9 MG/DL — SIGNIFICANT CHANGE UP (ref 8.4–10.5)
CHLORIDE SERPL-SCNC: 109 MMOL/L — HIGH (ref 96–108)
CO2 SERPL-SCNC: 25 MMOL/L — SIGNIFICANT CHANGE UP (ref 22–31)
COLOR SPEC: YELLOW — SIGNIFICANT CHANGE UP
CREAT SERPL-MCNC: 0.51 MG/DL — SIGNIFICANT CHANGE UP (ref 0.5–1.3)
DIFF PNL FLD: ABNORMAL
EGFR: 92 ML/MIN/1.73M2 — SIGNIFICANT CHANGE UP
EOSINOPHIL # BLD AUTO: 0.04 K/UL — SIGNIFICANT CHANGE UP (ref 0–0.5)
EOSINOPHIL NFR BLD AUTO: 0.5 % — SIGNIFICANT CHANGE UP (ref 0–6)
EPI CELLS # UR: SIGNIFICANT CHANGE UP /HPF
FLUAV H1 2009 PAND RNA SPEC QL NAA+PROBE: SIGNIFICANT CHANGE UP
FLUAV H1 RNA SPEC QL NAA+PROBE: SIGNIFICANT CHANGE UP
FLUAV H3 RNA SPEC QL NAA+PROBE: SIGNIFICANT CHANGE UP
FLUAV SUBTYP SPEC NAA+PROBE: SIGNIFICANT CHANGE UP
FLUBV RNA SPEC QL NAA+PROBE: SIGNIFICANT CHANGE UP
GLUCOSE SERPL-MCNC: 119 MG/DL — HIGH (ref 70–99)
GLUCOSE UR QL: NEGATIVE — SIGNIFICANT CHANGE UP
HADV DNA SPEC QL NAA+PROBE: SIGNIFICANT CHANGE UP
HCOV PNL SPEC NAA+PROBE: SIGNIFICANT CHANGE UP
HCT VFR BLD CALC: 36.9 % — SIGNIFICANT CHANGE UP (ref 34.5–45)
HGB BLD-MCNC: 11.7 G/DL — SIGNIFICANT CHANGE UP (ref 11.5–15.5)
HMPV RNA SPEC QL NAA+PROBE: SIGNIFICANT CHANGE UP
HPIV1 RNA SPEC QL NAA+PROBE: SIGNIFICANT CHANGE UP
HPIV2 RNA SPEC QL NAA+PROBE: SIGNIFICANT CHANGE UP
HPIV3 RNA SPEC QL NAA+PROBE: SIGNIFICANT CHANGE UP
HPIV4 RNA SPEC QL NAA+PROBE: SIGNIFICANT CHANGE UP
HYALINE CASTS # UR AUTO: ABNORMAL /LPF
IMM GRANULOCYTES NFR BLD AUTO: 0.4 % — SIGNIFICANT CHANGE UP (ref 0–0.9)
INR BLD: 0.98 RATIO — SIGNIFICANT CHANGE UP (ref 0.85–1.18)
KETONES UR-MCNC: NEGATIVE — SIGNIFICANT CHANGE UP
LACTATE SERPL-SCNC: 1.2 MMOL/L — SIGNIFICANT CHANGE UP (ref 0.7–2)
LEUKOCYTE ESTERASE UR-ACNC: ABNORMAL
LYMPHOCYTES # BLD AUTO: 1.13 K/UL — SIGNIFICANT CHANGE UP (ref 1–3.3)
LYMPHOCYTES # BLD AUTO: 14.3 % — SIGNIFICANT CHANGE UP (ref 13–44)
MAGNESIUM SERPL-MCNC: 2.1 MG/DL — SIGNIFICANT CHANGE UP (ref 1.6–2.6)
MCHC RBC-ENTMCNC: 26.2 PG — LOW (ref 27–34)
MCHC RBC-ENTMCNC: 31.7 GM/DL — LOW (ref 32–36)
MCV RBC AUTO: 82.6 FL — SIGNIFICANT CHANGE UP (ref 80–100)
MONOCYTES # BLD AUTO: 0.84 K/UL — SIGNIFICANT CHANGE UP (ref 0–0.9)
MONOCYTES NFR BLD AUTO: 10.6 % — SIGNIFICANT CHANGE UP (ref 2–14)
NEUTROPHILS # BLD AUTO: 5.86 K/UL — SIGNIFICANT CHANGE UP (ref 1.8–7.4)
NEUTROPHILS NFR BLD AUTO: 73.9 % — SIGNIFICANT CHANGE UP (ref 43–77)
NITRITE UR-MCNC: NEGATIVE — SIGNIFICANT CHANGE UP
NRBC # BLD: 0 /100 WBCS — SIGNIFICANT CHANGE UP (ref 0–0)
PH UR: 7 — SIGNIFICANT CHANGE UP (ref 5–8)
PHOSPHATE SERPL-MCNC: 2.7 MG/DL — SIGNIFICANT CHANGE UP (ref 2.5–4.5)
PLATELET # BLD AUTO: 284 K/UL — SIGNIFICANT CHANGE UP (ref 150–400)
POTASSIUM SERPL-MCNC: 3.7 MMOL/L — SIGNIFICANT CHANGE UP (ref 3.5–5.3)
POTASSIUM SERPL-SCNC: 3.7 MMOL/L — SIGNIFICANT CHANGE UP (ref 3.5–5.3)
PROT SERPL-MCNC: 7.6 G/DL — SIGNIFICANT CHANGE UP (ref 6–8.3)
PROT UR-MCNC: 100 MG/DL
PROTHROM AB SERPL-ACNC: 11.2 SEC — SIGNIFICANT CHANGE UP (ref 9.5–13)
RAPID RVP RESULT: SIGNIFICANT CHANGE UP
RBC # BLD: 4.47 M/UL — SIGNIFICANT CHANGE UP (ref 3.8–5.2)
RBC # FLD: 14.9 % — HIGH (ref 10.3–14.5)
RBC CASTS # UR COMP ASSIST: >50 /HPF (ref 0–2)
RSV RNA SPEC QL NAA+PROBE: SIGNIFICANT CHANGE UP
RV+EV RNA SPEC QL NAA+PROBE: SIGNIFICANT CHANGE UP
SARS-COV-2 RNA SPEC QL NAA+PROBE: SIGNIFICANT CHANGE UP
SODIUM SERPL-SCNC: 142 MMOL/L — SIGNIFICANT CHANGE UP (ref 135–145)
SP GR SPEC: 1 — LOW (ref 1.01–1.02)
TROPONIN I, HIGH SENSITIVITY RESULT: 7.3 NG/L — SIGNIFICANT CHANGE UP
UROBILINOGEN FLD QL: NEGATIVE — SIGNIFICANT CHANGE UP
WBC # BLD: 7.92 K/UL — SIGNIFICANT CHANGE UP (ref 3.8–10.5)
WBC # FLD AUTO: 7.92 K/UL — SIGNIFICANT CHANGE UP (ref 3.8–10.5)
WBC UR QL: ABNORMAL /HPF (ref 0–5)

## 2023-08-02 PROCEDURE — 71045 X-RAY EXAM CHEST 1 VIEW: CPT | Mod: 26

## 2023-08-02 PROCEDURE — 99223 1ST HOSP IP/OBS HIGH 75: CPT | Mod: GC

## 2023-08-02 PROCEDURE — 70450 CT HEAD/BRAIN W/O DYE: CPT | Mod: 26,MA

## 2023-08-02 PROCEDURE — 74177 CT ABD & PELVIS W/CONTRAST: CPT | Mod: 26,MA

## 2023-08-02 RX ORDER — SODIUM CHLORIDE 9 MG/ML
1000 INJECTION, SOLUTION INTRAVENOUS ONCE
Refills: 0 | Status: COMPLETED | OUTPATIENT
Start: 2023-08-02 | End: 2023-08-02

## 2023-08-02 RX ORDER — ENOXAPARIN SODIUM 100 MG/ML
40 INJECTION SUBCUTANEOUS EVERY 24 HOURS
Refills: 0 | Status: DISCONTINUED | OUTPATIENT
Start: 2023-08-02 | End: 2023-08-07

## 2023-08-02 RX ORDER — ACETAMINOPHEN 500 MG
650 TABLET ORAL EVERY 6 HOURS
Refills: 0 | Status: DISCONTINUED | OUTPATIENT
Start: 2023-08-02 | End: 2023-08-07

## 2023-08-02 RX ORDER — CEFTRIAXONE 500 MG/1
1000 INJECTION, POWDER, FOR SOLUTION INTRAMUSCULAR; INTRAVENOUS EVERY 24 HOURS
Refills: 0 | Status: COMPLETED | OUTPATIENT
Start: 2023-08-03 | End: 2023-08-05

## 2023-08-02 RX ORDER — CEFTRIAXONE 500 MG/1
1000 INJECTION, POWDER, FOR SOLUTION INTRAMUSCULAR; INTRAVENOUS ONCE
Refills: 0 | Status: COMPLETED | OUTPATIENT
Start: 2023-08-02 | End: 2023-08-02

## 2023-08-02 RX ORDER — ACETAMINOPHEN 500 MG
650 TABLET ORAL ONCE
Refills: 0 | Status: COMPLETED | OUTPATIENT
Start: 2023-08-02 | End: 2023-08-02

## 2023-08-02 RX ORDER — ONDANSETRON 8 MG/1
4 TABLET, FILM COATED ORAL ONCE
Refills: 0 | Status: COMPLETED | OUTPATIENT
Start: 2023-08-02 | End: 2023-08-02

## 2023-08-02 RX ADMIN — SODIUM CHLORIDE 1000 MILLILITER(S): 9 INJECTION, SOLUTION INTRAVENOUS at 01:20

## 2023-08-02 RX ADMIN — SODIUM CHLORIDE 1000 MILLILITER(S): 9 INJECTION, SOLUTION INTRAVENOUS at 02:20

## 2023-08-02 RX ADMIN — ENOXAPARIN SODIUM 40 MILLIGRAM(S): 100 INJECTION SUBCUTANEOUS at 18:08

## 2023-08-02 RX ADMIN — CEFTRIAXONE 100 MILLIGRAM(S): 500 INJECTION, POWDER, FOR SOLUTION INTRAMUSCULAR; INTRAVENOUS at 04:58

## 2023-08-02 RX ADMIN — CEFTRIAXONE 1000 MILLIGRAM(S): 500 INJECTION, POWDER, FOR SOLUTION INTRAMUSCULAR; INTRAVENOUS at 05:30

## 2023-08-02 RX ADMIN — Medication 650 MILLIGRAM(S): at 01:20

## 2023-08-02 RX ADMIN — Medication 650 MILLIGRAM(S): at 01:50

## 2023-08-02 RX ADMIN — ONDANSETRON 4 MILLIGRAM(S): 8 TABLET, FILM COATED ORAL at 01:20

## 2023-08-02 NOTE — PATIENT PROFILE ADULT - FALL HARM RISK - RISK INTERVENTIONS

## 2023-08-02 NOTE — H&P ADULT - PROBLEM SELECTOR PLAN 1
P/w AMS, vomiting, abdominal pain, and subjective fevers  Likely secondary to UTI and cystitis  Pending urine Cx and blood cx for sensitivities   Lactate negative  RPV negative  UA showed large leukocyte esterase and bacteria  CT A/P showed cystitis  Given 1 time Rocephin in ED  Start Zosyn for broad spectrum coverage   Trend fever, leukocytosis P/w AMS, vomiting, abdominal pain, and subjective fevers  Likely secondary to UTI and cystitis  Pending urine Cx and blood cx for sensitivities   Lactate negative  RPV negative  UA showed large leukocyte esterase and bacteria  CT A/P showed cystitis  Given 1 time Rocephin in ED  C/w Rocephin   Trend fever, leukocytosis P/w AMS, vomiting, abdominal pain, and subjective fevers  Likely secondary to UTI and cystitis  F/U urine Cx and blood cx for sensitivities   Lactate negative  RVP negative  C/w gentle fluids.   UA +ve.  CT A/P showed cystitis  Given 1 time Rocephin in ED  C/w Rocephin   Trend fever, leukocytosis  Questionable history of thyroid disease. F/U TSH.  Zofran as needed.

## 2023-08-02 NOTE — H&P ADULT - NSHPREVIEWOFSYSTEMS_GEN_ALL_CORE
REVIEW OF SYSTEMS:    CONSTITUTIONAL: No weakness, fevers or chills  EYES/ENT: No visual changes;  No vertigo or throat pain   NECK: No pain or stiffness  RESPIRATORY: No cough, wheezing, hemoptysis; No shortness of breath  CARDIOVASCULAR: No chest pain or palpitations  GASTROINTESTINAL: (+) abdominal pain, (+) nausea, vomiting, No hematemesis; No diarrhea or constipation. No melena or hematochezia.  GENITOURINARY: No dysuria, frequency or hematuria  NEUROLOGICAL: No numbness or weakness  SKIN: No itching, rashes

## 2023-08-02 NOTE — H&P ADULT - ASSESSMENT
Pt is a 82 y/o female with a PMHx of chronic lower back pain, presenting to the ED with AMS, multiple episodes of nonbillious, nonbloody vomiting, nausea, and abdominal pain, and admitted to medicine for further workup of acute metabolic encephalopathy secondary to UTI and cystitis.  Pt is a 82 y/o female bedbound (1.5 years) with a HHA 24hr/7days with a PMHx of chronic lower back pain, and recurrent UTIs presenting to the ED with AMS, multiple episodes of nonbillious, nonbloody vomiting, nausea, and abdominal pain, and admitted to medicine for further workup of acute metabolic encephalopathy secondary to UTI and cystitis.

## 2023-08-02 NOTE — ED PROVIDER NOTE - PHYSICAL EXAMINATION
CONSTITUTIONAL: non-toxic, well appearing  SKIN: no rash, no petechiae.  EYES: PERRL, EOMI, pink conjunctiva, anicteric  ENT: tongue and uvular midline, no exudates, moist mucous membranes  NECK: Supple; no meningismus, no JVD  CARD: RRR, no murmurs, equal radial pulses bilaterally 2+  RESP: CTAB, no respiratory distress  ABD: Soft, non-tender, non-distended, no peritoneal signs, no CVA tenderness  EXT: Normal ROM x4. No edema.   NEURO: Alert, oriented to person and place only. Neuro exam nonfocal, equal strength bilaterally  PSYCH: Cooperative, appropriate.

## 2023-08-02 NOTE — ED ADULT NURSE NOTE - NS ED NURSE PRESS ULCER APPEAR 11
Post-Care Instructions: I reviewed with the patient in detail post-care instructions. Patient is to wear sunprotection, and avoid picking at any of the treated lesions. Pt may apply Vaseline to crusted or scabbing areas. Medical Necessity Clause: This procedure was medically necessary because the lesions that were treated were occasionally Number Of Freeze-Thaw Cycles: 1 freeze-thaw cycle Include Z78.9 (Other Specified Conditions Influencing Health Status) As An Associated Diagnosis?: No Medical Necessity Information: It is in your best interest to select a reason for this procedure from the list below. All of these items fulfill various CMS LCD requirements except the new and changing color options. Duration Of Freeze Thaw-Cycle (Seconds): 15-20 Duration Of Freeze Thaw-Cycle (Seconds): 10-15 Detail Level: Simple Consent: The patient's consent was obtained including but not limited to risks of crusting, scabbing, blistering, scarring, darker or lighter pigmentary change, recurrence, incomplete removal and infection. yellow

## 2023-08-02 NOTE — ED ADULT NURSE NOTE - NSFALLUNIVINTERV_ED_ALL_ED
Bed/Stretcher in lowest position, wheels locked, appropriate side rails in place/Call bell, personal items and telephone in reach/Instruct patient to call for assistance before getting out of bed/chair/stretcher/Non-slip footwear applied when patient is off stretcher/[place] to call system/Physically safe environment - no spills, clutter or unnecessary equipment/Purposeful proactive rounding/Room/bathroom lighting operational, light cord in reach

## 2023-08-02 NOTE — ED ADULT NURSE REASSESSMENT NOTE - NS ED NURSE REASSESS COMMENT FT1
1350: Jose Smith covering for JOSE Wild. Tele assessment. PT admitted to medicine, No cardiac monitoring necessary as per DERRICK Mcmullen.

## 2023-08-02 NOTE — ED PROVIDER NOTE - PROGRESS NOTE DETAILS
[place]-DO: Labs/imaging consistent with UTI, will admit. Discussed with hospitalist and MAR re: admission.

## 2023-08-02 NOTE — ED PROVIDER NOTE - OBJECTIVE STATEMENT
83-year-old female with past medical history of renal frequent UTIs, prior admission for metabolic encephalopathy secondary to UTI presents with vomiting today.  Patient reports fevers and chills today, diffuse abdominal discomfort, multiple episodes of nonbloody nonbilious emesis.  Patient also reports chronic low back pain, unchanged from baseline.  Denies any headache, vision change, chest pain, shortness of breath, diarrhea, bloody stools, black tarry stools, numbness, focal weakness, or rash.  Denies any recent injury or trauma.  Denies any additional complaints.

## 2023-08-02 NOTE — H&P ADULT - NSHPPHYSICALEXAM_GEN_ALL_CORE
PHYSICAL EXAM:  GENERAL: NAD, no signs of respiratory distress, lying comfortably in bed  HEAD:  Atraumatic, Normocephalic  EYES: EOMI, PERRLA, conjunctiva and sclera clear  CHEST/LUNG: Clear to auscultation bilaterally; No wheeze; No crackles; No accessory muscles used  HEART: Regular rate and rhythm; No murmurs;   ABDOMEN: Soft, (+) LUQ tenderness, No suprapubic tenderness. Nondistended; Bowel sounds present; No guarding  EXTREMITIES:  2+ Peripheral Pulses, No cyanosis or edema  PSYCH: AAOx1- person  NEUROLOGY: non-focal  SKIN: No rashes or lesions PHYSICAL EXAM:  GENERAL: NAD, no signs of respiratory distress, lying comfortably in bed, frail looking  HEAD:  Atraumatic, Normocephalic  EYES: EOMI, PERRLA, conjunctiva and sclera clear  CHEST/LUNG: Clear to auscultation bilaterally; No wheeze; No crackles; No accessory muscles used  HEART: Regular rate and rhythm; No murmurs;   ABDOMEN: Soft, (+) LUQ tenderness, No suprapubic tenderness. Nondistended; Bowel sounds present; No guarding  EXTREMITIES:  2+ Peripheral Pulses, No edema  PSYCH: AAOx1- person  NEUROLOGY: non-focal  SKIN: No rashes or lesions

## 2023-08-02 NOTE — H&P ADULT - ATTENDING COMMENTS
Patient seen and examined. Case discussed with Dr. Fowler. In brief, this is a 82 yo F with functional quadriplegia, dementia, history of multiple UTI's who presents after developing abdominal pain with associated NBNB vomiting episodes. The son also noted that the patient was more confused than usual and as such decided to bring the patient to the ED. In the ED, patient underwent a CT A/P that was concerning for acute cystitis and a UA was also positive for possible infection. On review of urine cultures, patient does not appear to have a history of any resistant organisms. As such, will admit the patient for metabolic encephalopathy 2/2 acute cystitis. Agree with ceftriaxone 1g IV qdaily. Will follow-up blood and urine cultures. Per son, patient does not take any medications at home so will not need to continue anything at this time. Remaining care as noted above.

## 2023-08-02 NOTE — ED PROVIDER NOTE - CLINICAL SUMMARY MEDICAL DECISION MAKING FREE TEXT BOX
Pravin: 83-year-old female with past medical history of renal frequent UTIs, prior admission for metabolic encephalopathy secondary to UTI presents with vomiting today.  Patient reports fevers and chills today, diffuse abdominal discomfort, multiple episodes of nonbloody nonbilious emesis.  Patient also reports chronic low back pain, unchanged from baseline.  Denies any headache, vision change, chest pain, shortness of breath, diarrhea, bloody stools, black tarry stools, numbness, focal weakness, or rash.  Denies any recent injury or trauma. Unclear etiology, likely infectious vs. metabolic. Neuro exam nonfocal. Will obtain labs, imaging, provide supportive treatment with dispo pending workup.

## 2023-08-02 NOTE — ED ADULT NURSE NOTE - OBJECTIVE STATEMENT
BIBEMS with c/o abdominal pain associated wit nausea, vomiting and weakness., A/O x 2 (name & place)

## 2023-08-02 NOTE — H&P ADULT - HISTORY OF PRESENT ILLNESS
Pt is a 84 y/o female with a PMHx of chronic lower back pain, presents to the ED with multiple episodes of nonbillious, nonbloody vomiting, nausea, and abdominal pain. Pt initially also reported fever, and chills. Denies any headache, chest pain, shortness of breath, diarrhea, bloody stools, melena.  Denies any recent injury or trauma. Pt has a hx of acute metabolic encephalopathy secondary to UTI. Pt is not at her baseline of AOx3. She is altered, and AO to name, but not place or time. Upon examination, pt appears to be altered and reports she does not know why she is here because she feels well and does not recall her episodes of vomiting from last night.     In ED- pt received bolus of LR, Acetominophen for pain, and zofran for nausea.   UA: positive for UTI  CXR: neg  CT Head: neg  CT A/P: cystitis  Labs  WBC: 7.92  Hgb: 11.7  Troponin: negative  Lactate: negative  Electrolytes WNL  RVP: Negative Pt is a 82 y/o female with a PMHx of chronic lower back pain, presents to the ED with multiple episodes of nonbillious, nonbloody vomiting, nausea, and abdominal pain. Pt initially also reported fever, and chills. Denies any headache, chest pain, shortness of breath, diarrhea, bloody stools, melena.  Denies any recent injury or trauma. Pt has a hx of acute metabolic encephalopathy secondary to UTI. Pt is not at her baseline of AOx2. She is altered, and AOx1 to name, but not place or time. Upon examination, pt appears to be altered and reports she does not know why she is here because she feels well and does not recall her episodes of vomiting from last night. Spoke with Son, Hiro, who reports pt has been bed-bound for last year and a half, and has a 24/7 HHA.    In ED- pt received bolus of LR, Acetominophen for pain, and zofran for nausea.   UA: positive for UTI  CXR: neg  CT Head: neg  CT A/P: cystitis  Labs  WBC: 7.92  Hgb: 11.7  Troponin: negative  Lactate: negative  Electrolytes WNL  RVP: Negative Pt is a 82 y/o female bedbound (1.5 years) with a HHA 24hr/7days with a PMHx of chronic lower back pain, and recurrent UTIs who presents to the ED with multiple episodes of vomiting, nausea, and abdominal pain. On evaluation patient is AAOx1 (AAOx2 baseline) and denies any complains at this time. Patient reports she does not know the reason why she is here as she feels completely normal. Collateral history was provided by the son (Hiro Connor - [phone]). Patient's son reported that the patient has been having nausea and vomiting for the past few days and has also complained of abdominal pain.  Patient's son also reported that she has had associated fevers and chills with it. Patient denies any headache, chest pain, shortness of breath, diarrhea, bloody stools, melena.  Denies any recent injury or trauma. Patient has a history  of admission with acute metabolic encephalopathy secondary to UTI prior as well.      In ED- pt received bolus of LR,   UA: positive for UTI  s/p Ceftriaxone.

## 2023-08-02 NOTE — ED ADULT NURSE NOTE - ED STAT RN HANDOFF DETAILS
PT IS AAOX2/NO ACUTE DISTRESS NOTED /SAFETY MAINTAINED /FALL RISK /SIDE RAILS UP /REPORT GIVEN TO HOLDING RN/PIPER

## 2023-08-02 NOTE — H&P ADULT - PROBLEM SELECTOR PLAN 3
Bed bound at baseline  Lovenox subq P/w AMS  As per son, at baseline pt mentates at AOx2.   Pt currently AOx1  Hx of thyroid disease, unclear  TSH pending P/w AMS  As per son, at baseline pt mentates at AOx2.   Pt currently AOx1  Hx of thyroid disease, unclear  F/U TSH  Not on any home medication.

## 2023-08-03 LAB
ANION GAP SERPL CALC-SCNC: 8 MMOL/L — SIGNIFICANT CHANGE UP (ref 5–17)
BUN SERPL-MCNC: 13 MG/DL — SIGNIFICANT CHANGE UP (ref 7–18)
CALCIUM SERPL-MCNC: 9 MG/DL — SIGNIFICANT CHANGE UP (ref 8.4–10.5)
CHLORIDE SERPL-SCNC: 107 MMOL/L — SIGNIFICANT CHANGE UP (ref 96–108)
CO2 SERPL-SCNC: 25 MMOL/L — SIGNIFICANT CHANGE UP (ref 22–31)
CREAT SERPL-MCNC: 0.47 MG/DL — LOW (ref 0.5–1.3)
EGFR: 94 ML/MIN/1.73M2 — SIGNIFICANT CHANGE UP
GLUCOSE SERPL-MCNC: 83 MG/DL — SIGNIFICANT CHANGE UP (ref 70–99)
HCT VFR BLD CALC: 34 % — LOW (ref 34.5–45)
HGB BLD-MCNC: 10.6 G/DL — LOW (ref 11.5–15.5)
MAGNESIUM SERPL-MCNC: 2.2 MG/DL — SIGNIFICANT CHANGE UP (ref 1.6–2.6)
MCHC RBC-ENTMCNC: 25.8 PG — LOW (ref 27–34)
MCHC RBC-ENTMCNC: 31.2 GM/DL — LOW (ref 32–36)
MCV RBC AUTO: 82.7 FL — SIGNIFICANT CHANGE UP (ref 80–100)
NRBC # BLD: 0 /100 WBCS — SIGNIFICANT CHANGE UP (ref 0–0)
PHOSPHATE SERPL-MCNC: 3.3 MG/DL — SIGNIFICANT CHANGE UP (ref 2.5–4.5)
PLATELET # BLD AUTO: 260 K/UL — SIGNIFICANT CHANGE UP (ref 150–400)
POTASSIUM SERPL-MCNC: 3.5 MMOL/L — SIGNIFICANT CHANGE UP (ref 3.5–5.3)
POTASSIUM SERPL-SCNC: 3.5 MMOL/L — SIGNIFICANT CHANGE UP (ref 3.5–5.3)
RBC # BLD: 4.11 M/UL — SIGNIFICANT CHANGE UP (ref 3.8–5.2)
RBC # FLD: 15.5 % — HIGH (ref 10.3–14.5)
SODIUM SERPL-SCNC: 140 MMOL/L — SIGNIFICANT CHANGE UP (ref 135–145)
TSH SERPL-MCNC: 0.4 UU/ML — SIGNIFICANT CHANGE UP (ref 0.34–4.82)
WBC # BLD: 4.63 K/UL — SIGNIFICANT CHANGE UP (ref 3.8–10.5)
WBC # FLD AUTO: 4.63 K/UL — SIGNIFICANT CHANGE UP (ref 3.8–10.5)

## 2023-08-03 PROCEDURE — 99232 SBSQ HOSP IP/OBS MODERATE 35: CPT | Mod: GC

## 2023-08-03 PROCEDURE — 99223 1ST HOSP IP/OBS HIGH 75: CPT

## 2023-08-03 RX ADMIN — ENOXAPARIN SODIUM 40 MILLIGRAM(S): 100 INJECTION SUBCUTANEOUS at 17:29

## 2023-08-03 RX ADMIN — CEFTRIAXONE 100 MILLIGRAM(S): 500 INJECTION, POWDER, FOR SOLUTION INTRAMUSCULAR; INTRAVENOUS at 05:31

## 2023-08-03 NOTE — CONSULT NOTE ADULT - SUBJECTIVE AND OBJECTIVE BOX
Patient is a 83y old  Female who presents with a chief complaint of acute metabolic encephalopathy secondary to UTI bedbound (1.5 years) with a HHA 24hr/7days with a PMHx of chronic lower back pain, and recurrent UTIs who presents to the ED with multiple episodes of vomiting, nausea, and abdominal pain. On evaluation patient is AAOx1 (AAOx2 baseline) and denies any complains at this time. Patient reports she does not know the reason why she is here as she feels completely normal. Collateral history was provided by the son (Hiro Connor - [phone]). Patient's son reported that the patient has been having nausea and vomiting for the past few days and has also complained of abdominal pain.  Patient's son also reported that she has had associated fevers and chills with it. Patient denies any headache, chest pain, shortness of breath, diarrhea, bloody stools, melena.  Denies any recent injury or trauma. Patient has a history  of admission with acute metabolic encephalopathy secondary to UTI prior as well.      In ED- pt received bolus of LR,   UA: positive for UTI  s/p Ceftriaxone.  (02 Aug 2023 08:50)   Wound consult requested by team to assist w/ management of      wound/ pressure injury.   Pt (unable to)  c/o pain, drainage, odor, color change,  or worsening swelling. Offloading and pericare initiated upon admission as pt Increasingly sedentary 2/2 to illness.         Appetite good   Current Diet: Diet, Regular (08-02-23 @ 09:40)      REVIEW OF SYSTEMSsee HPI and PMH others neg, pain 0   Allergies    No Known Allergies    Intolerances         MEDICATIONS  (STANDING):  cefTRIAXone   IVPB 1000 milliGRAM(s) IV Intermittent every 24 hours  enoxaparin Injectable 40 milliGRAM(s) SubCutaneous every 24 hours    MEDICATIONS  (PRN):  acetaminophen     Tablet .. 650 milliGRAM(s) Oral every 6 hours PRN Temp greater or equal to 38C (100.4F), Mild Pain (1 - 3)    SOCIAL HISTORY:    lives  home, has  son    FAMILY HISTORY:   no h/o PVD or wound healing or skin/ significant problems  PAST MEDICAL & SURGICAL HISTORY:  No pertinent past medical history  No significant past surgical history        I&O's Summary    Vital Signs Last 24 Hrs  T(C): 36.4 (03 Aug 2023 20:31), Max: 36.8 (03 Aug 2023 04:30)  T(F): 97.6 (03 Aug 2023 20:31), Max: 98.2 (03 Aug 2023 04:30)  HR: 86 (03 Aug 2023 20:31) (77 - 86)  BP: 101/61 (03 Aug 2023 20:31) (101/61 - 107/61)  BP(mean): --  RR: 16 (03 Aug 2023 20:31) (16 - 18)  SpO2: 98% (03 Aug 2023 20:31) (96% - 98%)    Parameters below as of 03 Aug 2023 20:31  Patient On (Oxygen Delivery Method): room air    wt 41.7kg     PHYSICAL EXAM:  Constitutional: NAD,   Alert/ Confused speaks Cymro  cachectic/ thin,   HEENT:  NC/AT, PERRL, EOMI, sclera clear, mucosa moist, throat clear, trachea midline, neck supple,   Respiratory: nonlabored w/ equal chest rise     Gastrointestinal: soft NT/ND (+)BS  :  / purewick/      Neurology:  weakened strength & sensation grossly intact, moves arms legs to command     Psych: calm/ appropriate/   Musculoskeletal:  limited weak, no deformities/ contractures,   Vascular: BLE equally warm/ + dp bilat  no cyanosis, clubbing, edema nor acute ischemia, cap refill 3s                BLE DP/PT pulses palpable   Skin:  moist w/ good turgor,      serosanguinous drainage from  5x3x.1 unstageable- medihoney foam  No odor, erythema, increased warmth, tenderness, induration, fluctuance, nor crepitus        wound low back sacral pu       drainage Small serous  type     necrosis none,<   slough 100%granular none,<   CBC Full  -  ( 03 Aug 2023 08:05 )  WBC Count : 4.63 K/uL  RBC Count : 4.11 M/uL  Hemoglobin : 10.6 g/dL  Hematocrit : 34.0 %  Platelet Count - Automated : 260 K/uL  Mean Cell Volume : 82.7 fl  Mean Cell Hemoglobin : 25.8 pg  Mean Cell Hemoglobin Concentration : 31.2 gm/dL        08-03    140  |  107  |  13  ----------------------------<  83  3.5   |  25  |  0.47<L>    Ca    9.0      03 Aug 2023 08:05  Phos  3.3     08-03  Mg     2.2     08-03    TPro  7.6  /  Alb  3.4<L>  /  TBili  0.3  /  DBili  x   /  AST  23  /  ALT  24  /  AlkPhos  75  08-02              Radiology:< from: CT Head No Cont (08.02.23 @ 04:12) >    The calvarium is intact. Stable sclerotic focus in the right frontal   bone. Opacified right maxillary sinus likely related sequela of chronic   sinusitis. The remaining paranasal sinuses and mastoid air cells are   clear.    IMPRESSION:  Stable exam. No acute intracranial hemorrhage, vasogenic edema,   hydrocephalus or extra-axial collection.    --- End of Report ---        < end of copied text >  < from: CT Abdomen and Pelvis w/ IV Cont (08.02.23 @ 04:12) >    Underdistended but asymmetric regions of wall thickening and mucosal   hyperemia suggested. Mild perivesicular fat stranding.  REPRODUCTIVE ORGANS: Mildly thickened endometrium with 6 mm soft tissue   density (6-59). No adnexal soft tissue mass.    BOWEL: No bowel obstruction. Appendix is again not visualized, however,   no secondary signs of acute appendicitis in the right lower quadrant.   Moderate fecal burden in thecolon and rectum. Wall thickening at the   base of the cecum similar to the prior exam. Diverticulosis. No   additional colonic wall thickening or pericolonic inflammatory changes.  PERITONEUM: No ascites. No free air or intraperitoneal collection.  VESSELS: Within normal limits.  RETROPERITONEUM/LYMPH NODES: No lymphadenopathy.  ABDOMINAL WALL: Within normal limits.  BONES: Chronic compression fracture deformities of the lumbar spine.   Degenerative changes. No acute osseous abnormality.    IMPRESSION:  Cystitis.    Wall thickening base of cecum. Follow-up nonemergent colonoscopy is   recommended if one has not been performed within the past year.      < end of copied text >

## 2023-08-03 NOTE — PROGRESS NOTE ADULT - PROBLEM SELECTOR PLAN 3
P/w AMS  As per son, at baseline pt mentates at AOx2.   Pt currently AOx1  Hx of thyroid disease, unclear  F/U TSH  Not on any home medication. P/w AMS  As per son, at baseline pt mentates at AOx2.   Pt currently AOx1  Hx of thyroid disease, unclear  Serum TSH WNL (8/3: 0.40)  Not on any home medication.

## 2023-08-03 NOTE — PROGRESS NOTE ADULT - PROBLEM SELECTOR PLAN 1
P/w AMS, vomiting, abdominal pain, and subjective fevers  Likely secondary to UTI and cystitis  F/U urine Cx and blood cx for sensitivities   Lactate negative  RVP negative  C/w gentle fluids.   UA +ve.  CT A/P showed cystitis  Given 1 time Rocephin in ED  C/w Rocephin   Trend fever, leukocytosis  Questionable history of thyroid disease. F/U TSH.  Zofran as needed. P/w AMS, vomiting, abdominal pain, and subjective fevers  Likely secondary to UTI and cystitis  F/U urine Cx and blood cx for sensitivities   Lactate negative  RVP negative  C/w gentle fluids.   UA +ve. UC pending  CT A/P showed cystitis  Given 1 time Rocephin in ED  C/w Rocephin   Trend fever, leukocytosis  Questionable history of thyroid disease. F/U TSH.  Zofran as needed.

## 2023-08-03 NOTE — PROGRESS NOTE ADULT - SUBJECTIVE AND OBJECTIVE BOX
PGY-1 Progress Note discussed with attending    PAGER #: [[phone]] TILL 5:00 PM  PLEASE CONTACT ON CALL TEAM:  - On Call Team (Please refer to Claudine) FROM 5:00 PM - 8:30PM  - Nightfloat Team FROM 8:30 -7:30 AM    CHIEF COMPLAINT & BRIEF HOSPITAL COURSE:    INTERVAL HPI/OVERNIGHT EVENTS:   MEDICATIONS  (STANDING):  cefTRIAXone   IVPB 1000 milliGRAM(s) IV Intermittent every 24 hours  enoxaparin Injectable 40 milliGRAM(s) SubCutaneous every 24 hours    MEDICATIONS  (PRN):  acetaminophen     Tablet .. 650 milliGRAM(s) Oral every 6 hours PRN Temp greater or equal to 38C (100.4F), Mild Pain (1 - 3)      REVIEW OF SYSTEMS:  CONSTITUTIONAL: No fever, weight loss, or fatigue  RESPIRATORY: No shortness of breath  CARDIOVASCULAR: No chest pain  GASTROINTESTINAL: No abdominal pain.  GENITOURINARY: No dysuria  NEUROLOGICAL: No headaches  SKIN: No itching, burning, rashes    Vital Signs Last 24 Hrs  T(C): 36.8 (03 Aug 2023 04:30), Max: 36.9 (02 Aug 2023 17:57)  T(F): 98.2 (03 Aug 2023 04:30), Max: 98.5 (02 Aug 2023 17:57)  HR: 77 (03 Aug 2023 04:30) (62 - 77)  BP: 106/60 (03 Aug 2023 04:30) (102/60 - 110/62)  BP(mean): 78 (02 Aug 2023 16:00) (78 - 78)  RR: 18 (03 Aug 2023 04:30) (16 - 18)  SpO2: 98% (03 Aug 2023 04:30) (97% - 99%)    Parameters below as of 03 Aug 2023 04:30  Patient On (Oxygen Delivery Method): room air        PHYSICAL EXAMINATION:  GENERAL: NAD, well built  HEAD:  Atraumatic, Normocephalic  EYES:  conjunctiva and sclera clear  CHEST/LUNG: Clear to auscultation. No rales, rhonchi, wheezing, or rubs  HEART: Regular rate and rhythm; No murmurs, rubs, or gallops  ABDOMEN: Soft, Nontender, Nondistended; Bowel sounds present  NERVOUS SYSTEM:  Alert & Oriented X3,    EXTREMITIES:  2+ Peripheral Pulses, No clubbing, cyanosis, or edema  SKIN: warm dry                          10.6   4.63  )-----------( 260      ( 03 Aug 2023 08:05 )             34.0     08-03    140  |  107  |  13  ----------------------------<  83  3.5   |  25  |  0.47<L>    Ca    9.0      03 Aug 2023 08:05  Phos  3.3     08-03  Mg     2.2     08-03    TPro  7.6  /  Alb  3.4<L>  /  TBili  0.3  /  DBili  x   /  AST  23  /  ALT  24  /  AlkPhos  75  08-02    LIVER FUNCTIONS - ( 02 Aug 2023 01:09 )  Alb: 3.4 g/dL / Pro: 7.6 g/dL / ALK PHOS: 75 U/L / ALT: 24 U/L DA / AST: 23 U/L / GGT: x               PT/INR - ( 02 Aug 2023 01:09 )   PT: 11.2 sec;   INR: 0.98 ratio         PTT - ( 02 Aug 2023 01:09 )  PTT:28.7 sec    CAPILLARY BLOOD GLUCOSE      RADIOLOGY & ADDITIONAL TESTS:                   PGY-1 Progress Note discussed with attending    PAGER #: [[phone]] TILL 5:00 PM  PLEASE CONTACT ON CALL TEAM:  - On Call Team (Please refer to Claudine) FROM 5:00 PM - 8:30PM  - Nightfloat Team FROM 8:30 -7:30 AM    CHIEF COMPLAINT & BRIEF HOSPITAL COURSE:    INTERVAL HPI/OVERNIGHT EVENTS:   MEDICATIONS  (STANDING):  cefTRIAXone   IVPB 1000 milliGRAM(s) IV Intermittent every 24 hours  enoxaparin Injectable 40 milliGRAM(s) SubCutaneous every 24 hours    MEDICATIONS  (PRN):  acetaminophen     Tablet .. 650 milliGRAM(s) Oral every 6 hours PRN Temp greater or equal to 38C (100.4F), Mild Pain (1 - 3)      REVIEW OF SYSTEMS:  CONSTITUTIONAL: No fever, weight loss, or fatigue  RESPIRATORY: No shortness of breath  CARDIOVASCULAR: No chest pain  GASTROINTESTINAL: No abdominal pain.  GENITOURINARY: No dysuria  NEUROLOGICAL: No headaches; quadriplegic   SKIN: No itching, burning, rashes    Vital Signs Last 24 Hrs  T(C): 36.8 (03 Aug 2023 04:30), Max: 36.9 (02 Aug 2023 17:57)  T(F): 98.2 (03 Aug 2023 04:30), Max: 98.5 (02 Aug 2023 17:57)  HR: 77 (03 Aug 2023 04:30) (62 - 77)  BP: 106/60 (03 Aug 2023 04:30) (102/60 - 110/62)  BP(mean): 78 (02 Aug 2023 16:00) (78 - 78)  RR: 18 (03 Aug 2023 04:30) (16 - 18)  SpO2: 98% (03 Aug 2023 04:30) (97% - 99%)    Parameters below as of 03 Aug 2023 04:30  Patient On (Oxygen Delivery Method): room air        PHYSICAL EXAMINATION:  GENERAL: NAD, elderly, frail  HEAD:  Atraumatic, Normocephalic  EYES:  conjunctiva and sclera clear  CHEST/LUNG: Clear to auscultation. No rales, rhonchi, wheezing, or rubs  HEART: Regular rate and rhythm; No murmurs, rubs, or gallops  ABDOMEN: Soft, Nontender, Nondistended; Bowel sounds present  NERVOUS SYSTEM:  Alert & Oriented X1 (self); quadriplegic   EXTREMITIES:  2+ Peripheral Pulses, No clubbing, cyanosis, or edema  SKIN: warm dry                          10.6   4.63  )-----------( 260      ( 03 Aug 2023 08:05 )             34.0     08-03    140  |  107  |  13  ----------------------------<  83  3.5   |  25  |  0.47<L>    Ca    9.0      03 Aug 2023 08:05  Phos  3.3     08-03  Mg     2.2     08-03    TPro  7.6  /  Alb  3.4<L>  /  TBili  0.3  /  DBili  x   /  AST  23  /  ALT  24  /  AlkPhos  75  08-02    LIVER FUNCTIONS - ( 02 Aug 2023 01:09 )  Alb: 3.4 g/dL / Pro: 7.6 g/dL / ALK PHOS: 75 U/L / ALT: 24 U/L DA / AST: 23 U/L / GGT: x               PT/INR - ( 02 Aug 2023 01:09 )   PT: 11.2 sec;   INR: 0.98 ratio         PTT - ( 02 Aug 2023 01:09 )  PTT:28.7 sec    CAPILLARY BLOOD GLUCOSE      RADIOLOGY & ADDITIONAL TESTS:                   PGY-1 Progress Note discussed with attending    PAGER #: [[phone]] TILL 5:00 PM  PLEASE CONTACT ON CALL TEAM:  - On Call Team (Please refer to Claudine) FROM 5:00 PM - 8:30PM  - Nightfloat Team FROM 8:30 -7:30 AM    CHIEF COMPLAINT & BRIEF HOSPITAL COURSE:    INTERVAL HPI/OVERNIGHT EVENTS: No acute overnight events. Patient spoken to with Uzbek-speaking  Edilberto ID # [id number]. Complains of loss of appetite, abdominal pain, and nausea, but denies dysuria. Is confused as to why she is here in the hospital and still AMS.  MEDICATIONS  (STANDING):  cefTRIAXone   IVPB 1000 milliGRAM(s) IV Intermittent every 24 hours  enoxaparin Injectable 40 milliGRAM(s) SubCutaneous every 24 hours    MEDICATIONS  (PRN):  acetaminophen     Tablet .. 650 milliGRAM(s) Oral every 6 hours PRN Temp greater or equal to 38C (100.4F), Mild Pain (1 - 3)      REVIEW OF SYSTEMS:  CONSTITUTIONAL: No fever, weight loss, or fatigue  RESPIRATORY: No shortness of breath  CARDIOVASCULAR: No chest pain  GASTROINTESTINAL: No abdominal pain.  GENITOURINARY: No dysuria  NEUROLOGICAL: No headaches; quadriplegic   SKIN: No itching, burning, rashes    Vital Signs Last 24 Hrs  T(C): 36.8 (03 Aug 2023 04:30), Max: 36.9 (02 Aug 2023 17:57)  T(F): 98.2 (03 Aug 2023 04:30), Max: 98.5 (02 Aug 2023 17:57)  HR: 77 (03 Aug 2023 04:30) (62 - 77)  BP: 106/60 (03 Aug 2023 04:30) (102/60 - 110/62)  BP(mean): 78 (02 Aug 2023 16:00) (78 - 78)  RR: 18 (03 Aug 2023 04:30) (16 - 18)  SpO2: 98% (03 Aug 2023 04:30) (97% - 99%)    Parameters below as of 03 Aug 2023 04:30  Patient On (Oxygen Delivery Method): room air        PHYSICAL EXAMINATION:  GENERAL: NAD, elderly, frail  HEAD:  Atraumatic, Normocephalic  PHARYNX: Nonerythematous pharynx  EYES:  conjunctiva and sclera clear  CHEST/LUNG: Clear to auscultation. No rales, rhonchi, wheezing, or rubs  HEART: Regular rate and rhythm; No murmurs, rubs, or gallops  ABDOMEN: Soft, diffusely tender to light palpation. Tender to deep palpation in LUQ and suprapubic region, Nondistended; Bowel sounds present in all 4 quadrants.  NERVOUS SYSTEM:  Alert & Oriented X1 (self); quadriplegic   EXTREMITIES:  2+ Peripheral Pulses, No clubbing, cyanosis, or edema  SKIN: warm dry                          10.6   4.63  )-----------( 260      ( 03 Aug 2023 08:05 )             34.0     08-03    140  |  107  |  13  ----------------------------<  83  3.5   |  25  |  0.47<L>    Ca    9.0      03 Aug 2023 08:05  Phos  3.3     08-03  Mg     2.2     08-03    TPro  7.6  /  Alb  3.4<L>  /  TBili  0.3  /  DBili  x   /  AST  23  /  ALT  24  /  AlkPhos  75  08-02    LIVER FUNCTIONS - ( 02 Aug 2023 01:09 )  Alb: 3.4 g/dL / Pro: 7.6 g/dL / ALK PHOS: 75 U/L / ALT: 24 U/L DA / AST: 23 U/L / GGT: x               PT/INR - ( 02 Aug 2023 01:09 )   PT: 11.2 sec;   INR: 0.98 ratio         PTT - ( 02 Aug 2023 01:09 )  PTT:28.7 sec    CAPILLARY BLOOD GLUCOSE      RADIOLOGY & ADDITIONAL TESTS:

## 2023-08-03 NOTE — CONSULT NOTE ADULT - ASSESSMENT
83y old  Female  with sacral unstageable pu / acute metabolic encephalopathy secondary to UTI bedbound (1.5 years) with a HHA 24hr/7days with a PMHx of chronic lower back pain, and recurrent UTIs who presents to the ED with multiple episodes of vomiting, nausea, and abdominal pain. - now resolved   AAOx1 (AAOx2 baseline)  / history  of admission with acute metabolic encephalopathy secondary to UTI prior as well.    community aquired  pressure wound  HEALTH ISSUES - PROBLEM Dx:  Assessment  / Interventions :  sacral unstageable medihoney foam daily  DVT prophylaxisis : on AC  lovenox   cellulitis/ none -Pressure injury: CNS, encephalopathy PNS neuropathy  US  Héctor 11  -   Debridement :  -autolytic medihoney  excisional sharp- not indicated     Dressing: Absorptive  : Allevyn foam/   Moisture :no yeast  submammary/axilla groin, perineum   TRIAD BID /CAVILON ADVANCE TIW and prn soiling   consider probiotic acidophilus   Continue w/ attends under pads and Pericare  purewick care as per protocol    Topical Dry: CAVILON Advance/  Alexander,  moisture barrier/ Moisturize intact skin w/ SWEEN cream BID   Nutrition Consult for optimization in pt Protein Calorie Malnutrition, bmi 16  Encourage  PO intake, & Increased nutritional needs with albumin <2.5;  35cal/kg, protien 1.2-1.5g/kg,supplement ,     MVI & Vit B/ C   for   Continue w/ low air loss pressure redistribution bed surface   assistive devices as per protocol  Established/ problem--- stable , worsened  Additional workup : as noted/   data reviewed lab, tests, records, image  Complexity high    Risk read    high -  due to dx, complexity data, risk  Pressure :Group 2 mattress on hospital bed and ROHO cushion for wheel chair upon discharge home  Offload: podiatric/  for home consider Waffle Cushion to chair when oob to chair/ Turning and positioning w/ offloading      Care as per medicine, will follow w/ you/ remain available as requested   Upon discharge f/u as outpatient at [facility] [address] [facility] [phone]/ [facility]  [address] [facility] [address] 2nd floor [phone]  time 75 min 223

## 2023-08-03 NOTE — PROGRESS NOTE ADULT - ATTENDING COMMENTS
Patient seen and examined. Case discussed with housestaff. Communicated with patient via  #[id number].  In brief, this is a 82 yo F with functional quadriplegia, dementia, history of multiple UTI's who presents after developing abdominal pain with associated NBNB vomiting episodes. The son also noted that the patient was more confused than usual and as such decided to bring the patient to the ED. In the ED, patient underwent a CT A/P that was concerning for acute cystitis and a UA was also positive for possible infection. On review of urine cultures, patient does not appear to have a history of any resistant organisms. As such, the patient was admitted for metabolic encephalopathy 2/2 acute cystitis. Will continue ceftriaxone Patient seen and examined. Case discussed with housestaff. Communicated with patient via  #[id number].  In brief, this is a 84 yo F with functional quadriplegia, dementia, history of multiple UTI's who presents after developing abdominal pain with associated NBNB vomiting episodes. The son also noted that the patient was more confused than usual and as such decided to bring the patient to the ED. In the ED, patient underwent a CT A/P that was concerning for acute cystitis and a UA was also positive for possible infection. On review of urine cultures, patient does not appear to have a history of any resistant organisms. As such, the patient was admitted for metabolic encephalopathy 2/2 acute cystitis. Will continue ceftriaxone and follow-up urine cultures. Blood cultures are currently NTD. DC planning pending results of the patient's urine cultures. Remaining care as noted above.

## 2023-08-03 NOTE — PROGRESS NOTE ADULT - ASSESSMENT
Pt is a 84 y/o female bedbound (1.5 years) with a HHA 24hr/7days with a PMHx of chronic lower back pain, and recurrent UTIs presenting to the ED with AMS, multiple episodes of nonbillious, nonbloody vomiting, nausea, and abdominal pain, and admitted to medicine for further workup of acute metabolic encephalopathy secondary to UTI and cystitis.

## 2023-08-04 DIAGNOSIS — E43 UNSPECIFIED SEVERE PROTEIN-CALORIE MALNUTRITION: ICD-10-CM

## 2023-08-04 LAB
ANION GAP SERPL CALC-SCNC: 6 MMOL/L — SIGNIFICANT CHANGE UP (ref 5–17)
BASOPHILS # BLD AUTO: 0.01 K/UL — SIGNIFICANT CHANGE UP (ref 0–0.2)
BASOPHILS NFR BLD AUTO: 0.2 % — SIGNIFICANT CHANGE UP (ref 0–2)
BUN SERPL-MCNC: 14 MG/DL — SIGNIFICANT CHANGE UP (ref 7–18)
CALCIUM SERPL-MCNC: 8.8 MG/DL — SIGNIFICANT CHANGE UP (ref 8.4–10.5)
CHLORIDE SERPL-SCNC: 110 MMOL/L — HIGH (ref 96–108)
CO2 SERPL-SCNC: 26 MMOL/L — SIGNIFICANT CHANGE UP (ref 22–31)
CREAT SERPL-MCNC: 0.52 MG/DL — SIGNIFICANT CHANGE UP (ref 0.5–1.3)
EGFR: 92 ML/MIN/1.73M2 — SIGNIFICANT CHANGE UP
EOSINOPHIL # BLD AUTO: 0.14 K/UL — SIGNIFICANT CHANGE UP (ref 0–0.5)
EOSINOPHIL NFR BLD AUTO: 3.1 % — SIGNIFICANT CHANGE UP (ref 0–6)
GLUCOSE SERPL-MCNC: 112 MG/DL — HIGH (ref 70–99)
HCT VFR BLD CALC: 36 % — SIGNIFICANT CHANGE UP (ref 34.5–45)
HGB BLD-MCNC: 11 G/DL — LOW (ref 11.5–15.5)
IMM GRANULOCYTES NFR BLD AUTO: 0.7 % — SIGNIFICANT CHANGE UP (ref 0–0.9)
LYMPHOCYTES # BLD AUTO: 1.32 K/UL — SIGNIFICANT CHANGE UP (ref 1–3.3)
LYMPHOCYTES # BLD AUTO: 29 % — SIGNIFICANT CHANGE UP (ref 13–44)
MAGNESIUM SERPL-MCNC: 2.2 MG/DL — SIGNIFICANT CHANGE UP (ref 1.6–2.6)
MCHC RBC-ENTMCNC: 25.7 PG — LOW (ref 27–34)
MCHC RBC-ENTMCNC: 30.6 GM/DL — LOW (ref 32–36)
MCV RBC AUTO: 84.1 FL — SIGNIFICANT CHANGE UP (ref 80–100)
MONOCYTES # BLD AUTO: 0.53 K/UL — SIGNIFICANT CHANGE UP (ref 0–0.9)
MONOCYTES NFR BLD AUTO: 11.6 % — SIGNIFICANT CHANGE UP (ref 2–14)
NEUTROPHILS # BLD AUTO: 2.52 K/UL — SIGNIFICANT CHANGE UP (ref 1.8–7.4)
NEUTROPHILS NFR BLD AUTO: 55.4 % — SIGNIFICANT CHANGE UP (ref 43–77)
NRBC # BLD: 0 /100 WBCS — SIGNIFICANT CHANGE UP (ref 0–0)
PHOSPHATE SERPL-MCNC: 3 MG/DL — SIGNIFICANT CHANGE UP (ref 2.5–4.5)
PLATELET # BLD AUTO: 271 K/UL — SIGNIFICANT CHANGE UP (ref 150–400)
POTASSIUM SERPL-MCNC: 3.6 MMOL/L — SIGNIFICANT CHANGE UP (ref 3.5–5.3)
POTASSIUM SERPL-SCNC: 3.6 MMOL/L — SIGNIFICANT CHANGE UP (ref 3.5–5.3)
RBC # BLD: 4.28 M/UL — SIGNIFICANT CHANGE UP (ref 3.8–5.2)
RBC # FLD: 15.4 % — HIGH (ref 10.3–14.5)
SODIUM SERPL-SCNC: 142 MMOL/L — SIGNIFICANT CHANGE UP (ref 135–145)
WBC # BLD: 4.55 K/UL — SIGNIFICANT CHANGE UP (ref 3.8–10.5)
WBC # FLD AUTO: 4.55 K/UL — SIGNIFICANT CHANGE UP (ref 3.8–10.5)

## 2023-08-04 PROCEDURE — 99232 SBSQ HOSP IP/OBS MODERATE 35: CPT | Mod: GC

## 2023-08-04 RX ADMIN — CEFTRIAXONE 100 MILLIGRAM(S): 500 INJECTION, POWDER, FOR SOLUTION INTRAMUSCULAR; INTRAVENOUS at 06:01

## 2023-08-04 RX ADMIN — ENOXAPARIN SODIUM 40 MILLIGRAM(S): 100 INJECTION SUBCUTANEOUS at 17:09

## 2023-08-04 NOTE — DIETITIAN INITIAL EVALUATION ADULT - PROBLEM SELECTOR PLAN 3
P/w AMS  As per son, at baseline pt mentates at AOx2.   Pt currently AOx1  Hx of thyroid disease, unclear  F/U TSH  Not on any home medication.

## 2023-08-04 NOTE — DIETITIAN INITIAL EVALUATION ADULT - FACTORS AFF FOOD INTAKE
difficulty with food procurement/preparation/persistent lack of appetite/persistent nausea/vomiting/other (specify)

## 2023-08-04 NOTE — DISCHARGE NOTE PROVIDER - CARE PROVIDER_API CALL
Johanna Griffiths  Surgery  [address] [facility], [address]nd [place] [address]  Phone: [phone]  Fax: [phone]  Follow Up Time: 1 month

## 2023-08-04 NOTE — DIETITIAN INITIAL EVALUATION ADULT - OTHER INFO
Patient from home bedbound (1.5 years) has HHA (24/7) admitted for acute metabolic encephalopathy secondary to UTI. Visited pt. alert & awake, Wolof speaking, accepted "[employer]", ID# [id number] - Rosey, per pt. "eating okay, not as much as before", "I eat very little 3 meals", no "problems with chewing or swallowing", do not "know if she vomited?" prior to admit. Unsure if she has loss weight?, dosing wt. 92 Lbs on 08/02/23 & agreed to nutrition focus physical exam, see below jaydelds. Attempted to obtained food preference pt. reports "eating what I am served", accepted nutrition supplements, per flow sheets intake 26-50% with meal set up, encourage po intake &  pending blood cultures?

## 2023-08-04 NOTE — DISCHARGE NOTE PROVIDER - NSDCCAREPROVSEEN_GEN_ALL_CORE_FT
Brandyn, Keke Griffiths, Johanna Lu, Sarahi Del Toro, Erica Wooten, Tabitha Porter, Mell Alan, César Dubose, Lennox

## 2023-08-04 NOTE — PROGRESS NOTE ADULT - PROBLEM SELECTOR PLAN 1
P/w AMS, vomiting, abdominal pain, and subjective fevers  Likely secondary to UTI and cystitis  F/U urine Cx and blood cx for sensitivities   Lactate negative  RVP negative  C/w gentle fluids.   UA +ve. UC pending  CT A/P showed cystitis  Given 1 time Rocephin in ED  C/w Rocephin   Trend fever, leukocytosis  Questionable history of thyroid disease. F/U TSH.  Zofran as needed. P/w AMS, vomiting, abdominal pain, and subjective fevers  Likely secondary to UTI and cystitis  F/U urine Cx and blood cx for sensitivities   Lactate negative  RVP negative  C/w gentle fluids.   UA +ve. UC pending  CT A/P showed cystitis  Given 1 time Rocephin in ED  last day rocephin 8/4  Trend fever, leukocytosis  Questionable history of thyroid disease. F/U TSH.  Zofran as needed. P/w AMS, vomiting, abdominal pain, and subjective fevers  Likely secondary to UTI and cystitis  Lactate negative  RVP negative  C/w gentle fluids.   UA +ve. UC E. coli, pending sensitivities.  BC NGTD  CT A/P showed cystitis  Given 1 time Rocephin in ED  last day rocephin 8/4  Trend fever, leukocytosis  Questionable history of thyroid disease. TSH WNL (8/3: 0.40)  Zofran as needed.

## 2023-08-04 NOTE — DIETITIAN INITIAL EVALUATION ADULT - PERTINENT MEDS FT
MEDICATIONS  (STANDING):  cefTRIAXone   IVPB 1000 milliGRAM(s) IV Intermittent every 24 hours  enoxaparin Injectable 40 milliGRAM(s) SubCutaneous every 24 hours    MEDICATIONS  (PRN):  acetaminophen     Tablet .. 650 milliGRAM(s) Oral every 6 hours PRN Temp greater or equal to 38C (100.4F), Mild Pain (1 - 3)

## 2023-08-04 NOTE — PROGRESS NOTE ADULT - PROBLEM SELECTOR PLAN 3
P/w AMS  As per son, at baseline pt mentates at AOx2.   Pt currently AOx1  Hx of thyroid disease, unclear  Serum TSH WNL (8/3: 0.40)  Not on any home medication. P/w AMS  As per son, at baseline pt mentates at AOx2.   Pt currently AAOx2  Hx of thyroid disease, unclear. Serum TSH WNL (8/3: 0.40)  Not on any home medication.

## 2023-08-04 NOTE — DISCHARGE NOTE PROVIDER - HOSPITAL COURSE
Pt is a 84 y/o female bedbound (1.5 years) with a HHA 24hr/7days with a PMHx of chronic lower back pain and recurrent UTIs presenting to the ED with AMS, multiple episodes of nonbillious, nonbloody vomiting, nausea, and abdominal pain, and admitted to medicine for further workup of acute metabolic encephalopathy secondary to UTI and cystitis.       Infectious encephalopathy  AMS, vomiting, abdominal pain, and subjective fevers Likely secondary to UTI and cystitis Lactate negative. RVP negative Her urine UA +ve. UC E. coli, pending sensitivities which grew_________________________  BC NGTD CT A/P showed cystitis Given 1 time Rocephin in ED last day rocephin 8/4 Trend fever, leukocytosis Questionable history of thyroid disease. TSH WNL (8/3: 0.40)  Zofran as needed.    #Acute cystitis.   ·  Plan: See plan above.  awaiting e. coli sensitivities.    Dementia.   ·  Plan: P/w AMS  As per son, at baseline pt mentates at AOx2.   Pt currently AAOx2  Hx of thyroid disease, unclear. Serum TSH WNL (8/3: 0.40)  Not on any home medication.    Severe protein-calorie malnutrition.  ·  Plan: Patient with severe protein calorie malnutrition  -Ensure Enlive added to patient's diet.    Pt is stable for discharge. Pt has been advised to follow up as outpatient. Case has sascha discussed with the attending. This is just a summary of the case. For further information please refer to pt. chart document.   Pt is a 84 y/o female bedbound (1.5 years) with a HHA 24hr/7days with a PMHx of chronic lower back pain and recurrent UTIs presenting to the ED with AMS, multiple episodes of nonbillious, nonbloody vomiting, nausea, and abdominal pain, and admitted to medicine for further workup of acute metabolic encephalopathy secondary to UTI and cystitis.       Infectious encephalopathy  AMS, vomiting, abdominal pain, and subjective fevers Likely secondary to UTI and cystitis Lactate negative. RVP negative Her urine UA +ve. UC E. coli and Aerococcus, sensitivities grew pans-sensitive E. Coli and Aerococcus.BCx were negative. CT A/P showed cystitis Given 1 time Rocephin in ED last day rocephin 8/4 Trend fever, leukocytosis Questionable history of thyroid disease. TSH WNL (8/3: 0.40)  Zofran as needed.    #Acute cystitis.   Patient grew pan-sensitive E. coli and Aerococcus in her urine. She was treated with a 3 day course of ceftriaxone.     Dementia.   At baseline patient mentates at AOx2. On admission, she was AOx1 but now is AOx2. Her thyroid was checked and noted to be WNL.    Severe protein-calorie malnutrition.  Patient with severe protein calorie malnutrition. Ensure Enlive added to the patient's diet.      Unstageable Community-Acquired Sacral Wound  Patient seen by wound care. Recommended washing the wound with saline daily and then dressing medi-honey and cover with a foam dressing every day. She was recommended to follow-up with Dr. Ray at [address] [place] [address]. for further wound care.    Pt is stable for discharge. Pt has been advised to follow up as outpatient. Case has sascha discussed with the attending. This is just a summary of the case. For further information please refer to pt. chart document.

## 2023-08-04 NOTE — DIETITIAN INITIAL EVALUATION ADULT - PROBLEM SELECTOR PLAN 1
P/w AMS, vomiting, abdominal pain, and subjective fevers  Likely secondary to UTI and cystitis  F/U urine Cx and blood cx for sensitivities   Lactate negative  RVP negative  C/w gentle fluids.   UA +ve.  CT A/P showed cystitis  Given 1 time Rocephin in ED  C/w Rocephin   Trend fever, leukocytosis  Questionable history of thyroid disease. F/U TSH.  Zofran as needed.

## 2023-08-04 NOTE — DIETITIAN INITIAL EVALUATION ADULT - PERTINENT LABORATORY DATA
08-04    142  |  110<H>  |  14  ----------------------------<  112<H>  3.6   |  26  |  0.52    Ca    8.8      04 Aug 2023 06:40  Phos  3.0     08-04  Mg     2.2     08-04

## 2023-08-04 NOTE — DIETITIAN NUTRITION RISK NOTIFICATION - TREATMENT: THE FOLLOWING DIET HAS BEEN RECOMMENDED
Diet, Regular:   Supplement Feeding Modality:  Oral  Ensure Enlive Cans or Servings Per Day:  1       Frequency:  Three Times a day (08-04-23 @ 12:16) [Pending Verification By Attending]  Diet, Regular (08-02-23 @ 09:40) [Active]

## 2023-08-04 NOTE — DISCHARGE NOTE PROVIDER - NSDCCPCAREPLAN_GEN_ALL_CORE_FT
PRINCIPAL DISCHARGE DIAGNOSIS  Diagnosis: Acute metabolic encephalopathy  Assessment and Plan of Treatment: You came in with altered mental status. This was likely due to infection. Your cultures grew______________  You completed antibiotics and your symptoms resolved.  Please follow up with your PCP      SECONDARY DISCHARGE DIAGNOSES  Diagnosis: Acute cystitis  Assessment and Plan of Treatment: A UTI is an infection in any part of the urinary system, the kidneys, bladder, or urethra. Urinary tract infections are more common in women. They usually occur in the bladder or urethra, but more serious infections involve the kidney. A bladder infection may cause pelvic pain, increased urge to urinate, pain with urination, and blood in the urine. A kidney infection may cause back pain, nausea, vomiting, and fever. Urine tract infections can cause confusion and altered mental status especially in the elderly. Common treatment is with antibiotics. Your urine analysis was positive and we treated you with a course of IV antibiotic : Rocephin for_____________    Diagnosis: Dementia  Assessment and Plan of Treatment: Please follow up with your PCP     PRINCIPAL DISCHARGE DIAGNOSIS  Diagnosis: Acute metabolic encephalopathy  Assessment and Plan of Treatment: You came in with altered mental status. This was likely due to infection. Your cultures grew E. coli and Aerococcus.   You completed antibiotics and your symptoms resolved.  Please follow up with your PCP      SECONDARY DISCHARGE DIAGNOSES  Diagnosis: Dementia  Assessment and Plan of Treatment: Please follow up with your PCP    Diagnosis: Severe protein-calorie malnutrition  Assessment and Plan of Treatment: Add Ensure Enlive to your diet three times per day.    Diagnosis: Acute cystitis  Assessment and Plan of Treatment: A UTI is an infection in any part of the urinary system, the kidneys, bladder, or urethra. Urinary tract infections are more common in women. They usually occur in the bladder or urethra, but more serious infections involve the kidney. A bladder infection may cause pelvic pain, increased urge to urinate, pain with urination, and blood in the urine. A kidney infection may cause back pain, nausea, vomiting, and fever. Urine tract infections can cause confusion and altered mental status especially in the elderly. Common treatment is with antibiotics. Your urine analysis was positive and we treated you with a course of IV antibiotic : Rocephin for 3 days.    Diagnosis: Wound of sacral region  Assessment and Plan of Treatment: You were noted to have a wound on your sacral region. You were seen by our wound care physician who recommended cleaning the wound daily with normal saline. You should then dress the wound with medical honey (provided to you) and cover with a foam dressing. You can follow-up with our wound care physician, Dr. Ray at [address] [place] [address] for further treatment.

## 2023-08-04 NOTE — DISCHARGE NOTE PROVIDER - ATTENDING DISCHARGE PHYSICAL EXAMINATION:
PHYSICAL EXAM:  GENERAL: NAD, cachectic  HEAD:  Atraumatic, Normocephalic  EYES:  conjunctiva and sclera clear  NECK: Supple, No JVD  CHEST/LUNG: Clear to auscultation bilaterally; No wheeze, rhonchi, rales  HEART: Regular rate and rhythm; No murmurs, rubs, or gallops  ABDOMEN: Soft, Nontender, Nondistended; Bowel sounds present  EXTREMITIES:  2+ Peripheral Pulses, No clubbing, cyanosis, or edema  PSYCH: AAOx2 (to person, place)  NEUROLOGY: non-focal  SKIN: +non-stageable sacral ulcer  Patient seen and examined at bedside. Communicated with patient via  #[id number], Jazmin. Patient reports feeling well and wants to go home. She denies any CP, SOB, palpitations, abdominal pain, nausea, vomiting, diarrhea. She is excited about the prospects of going home. She is medically stable for dc.     PHYSICAL EXAM:  GENERAL: NAD, cachectic  HEAD:  Atraumatic, Normocephalic  EYES:  conjunctiva and sclera clear  NECK: Supple, No JVD  CHEST/LUNG: Clear to auscultation bilaterally; No wheeze, rhonchi, rales  HEART: Regular rate and rhythm; No murmurs, rubs, or gallops  ABDOMEN: Soft, Nontender, Nondistended; Bowel sounds present  EXTREMITIES:  2+ Peripheral Pulses, No clubbing, cyanosis, or edema  PSYCH: AAOx2 (to person, place)  NEUROLOGY: non-focal  SKIN: +non-stageable sacral ulcer  Patient seen and examined at bedside. Communicated with patient via  #[id number], Patti. Patient reports feeling well and wants to go home. She denies any CP, SOB, palpitations, abdominal pain, nausea, vomiting, diarrhea. She is excited about the prospects of going home. She is medically stable for dc.     PHYSICAL EXAM:  GENERAL: NAD, cachectic  HEAD:  Atraumatic, Normocephalic  EYES:  conjunctiva and sclera clear  NECK: Supple, No JVD  CHEST/LUNG: Clear to auscultation bilaterally; No wheeze, rhonchi, rales  HEART: Regular rate and rhythm; No murmurs, rubs, or gallops  ABDOMEN: Soft, Nontender, Nondistended; Bowel sounds present  EXTREMITIES:  2+ Peripheral Pulses, No clubbing, cyanosis, or edema  PSYCH: AAOx2 (to person, place)  NEUROLOGY: non-focal  SKIN: +non-stageable sacral ulcer

## 2023-08-04 NOTE — DISCHARGE NOTE PROVIDER - NSDCMRMEDTOKEN_GEN_ALL_CORE_FT
acetaminophen 325 mg oral tablet: 2 tab(s) orally every 6 hours as needed for  moderate pain  lidocaine 4% topical film: Apply topically to affected area once a day  Outpatient Physical Therapy: Outpatient Physical Therapy 3x-5x per week   acetaminophen 325 mg oral tablet: 2 tab(s) orally every 6 hours as needed for  moderate pain  lidocaine 4% topical film: Apply topically to affected area once a day

## 2023-08-04 NOTE — PROGRESS NOTE ADULT - PROBLEM SELECTOR PLAN 4
Bed bound at baseline  Lovenox subq Patient with severe protein calorie malnutrition  -Ensure Enlive added to patient's diet

## 2023-08-04 NOTE — DISCHARGE NOTE PROVIDER - DETAILS OF MALNUTRITION DIAGNOSIS/DIAGNOSES
This patient has been assessed with a concern for Malnutrition and was treated during this hospitalization for the following Nutrition diagnosis/diagnoses:     -  08/04/2023: Severe protein-calorie malnutrition   -  08/04/2023: Underweight (BMI < 19)

## 2023-08-04 NOTE — PROGRESS NOTE ADULT - SUBJECTIVE AND OBJECTIVE BOX
PGY-1 Progress Note discussed with attending    PAGER #: [[phone]] TILL 5:00 PM  PLEASE CONTACT ON CALL TEAM:  - On Call Team (Please refer to Claudine) FROM 5:00 PM - 8:30PM  - Nightfloat Team FROM 8:30 -7:30 AM    CHIEF COMPLAINT & BRIEF HOSPITAL COURSE:    INTERVAL HPI/OVERNIGHT EVENTS:   MEDICATIONS  (STANDING):  cefTRIAXone   IVPB 1000 milliGRAM(s) IV Intermittent every 24 hours  enoxaparin Injectable 40 milliGRAM(s) SubCutaneous every 24 hours    MEDICATIONS  (PRN):  acetaminophen     Tablet .. 650 milliGRAM(s) Oral every 6 hours PRN Temp greater or equal to 38C (100.4F), Mild Pain (1 - 3)      REVIEW OF SYSTEMS:  CONSTITUTIONAL: No fever, weight loss, or fatigue  RESPIRATORY: No shortness of breath  CARDIOVASCULAR: No chest pain  GASTROINTESTINAL: No abdominal pain.  GENITOURINARY: No dysuria  NEUROLOGICAL: No headaches  SKIN: No itching, burning, rashes    Vital Signs Last 24 Hrs  T(C): 36.7 (04 Aug 2023 05:35), Max: 36.7 (03 Aug 2023 13:52)  T(F): 98 (04 Aug 2023 05:35), Max: 98 (03 Aug 2023 13:52)  HR: 64 (04 Aug 2023 05:35) (64 - 86)  BP: 120/68 (04 Aug 2023 05:35) (101/61 - 120/68)  BP(mean): --  RR: 18 (04 Aug 2023 05:35) (16 - 18)  SpO2: 98% (04 Aug 2023 05:35) (96% - 98%)    Parameters below as of 04 Aug 2023 05:35  Patient On (Oxygen Delivery Method): room air        PHYSICAL EXAMINATION:  GENERAL: NAD, well built  HEAD:  Atraumatic, Normocephalic  EYES:  conjunctiva and sclera clear  CHEST/LUNG: Clear to auscultation. No rales, rhonchi, wheezing, or rubs  HEART: Regular rate and rhythm; No murmurs, rubs, or gallops  ABDOMEN: Soft, Nontender, Nondistended; Bowel sounds present  NERVOUS SYSTEM:  Alert & Oriented X3,    EXTREMITIES:  2+ Peripheral Pulses, No clubbing, cyanosis, or edema  SKIN: warm dry                          11.0   4.55  )-----------( 271      ( 04 Aug 2023 06:40 )             36.0     08-04    142  |  110<H>  |  14  ----------------------------<  112<H>  3.6   |  26  |  0.52    Ca    8.8      04 Aug 2023 06:40  Phos  3.0     08-04  Mg     2.2     08-04                CAPILLARY BLOOD GLUCOSE      RADIOLOGY & ADDITIONAL TESTS:                   PGY-1 Progress Note discussed with attending    PAGER #: [[phone]] TILL 5:00 PM  PLEASE CONTACT ON CALL TEAM:  - On Call Team (Please refer to Claudine) FROM 5:00 PM - 8:30PM  - Nightfloat Team FROM 8:30 -7:30 AM    CHIEF COMPLAINT & BRIEF HOSPITAL COURSE:    INTERVAL HPI/OVERNIGHT EVENTS:   MEDICATIONS  (STANDING):  cefTRIAXone   IVPB 1000 milliGRAM(s) IV Intermittent every 24 hours  enoxaparin Injectable 40 milliGRAM(s) SubCutaneous every 24 hours    MEDICATIONS  (PRN):  acetaminophen     Tablet .. 650 milliGRAM(s) Oral every 6 hours PRN Temp greater or equal to 38C (100.4F), Mild Pain (1 - 3)      REVIEW OF SYSTEMS:  CONSTITUTIONAL: No fever, weight loss, or fatigue  RESPIRATORY: No shortness of breath  CARDIOVASCULAR: No chest pain  GASTROINTESTINAL: No abdominal pain.  GENITOURINARY: No dysuria  NEUROLOGICAL: No headaches  SKIN: No itching, burning, rashes; small sacral wound    Vital Signs Last 24 Hrs  T(C): 36.7 (04 Aug 2023 05:35), Max: 36.7 (03 Aug 2023 13:52)  T(F): 98 (04 Aug 2023 05:35), Max: 98 (03 Aug 2023 13:52)  HR: 64 (04 Aug 2023 05:35) (64 - 86)  BP: 120/68 (04 Aug 2023 05:35) (101/61 - 120/68)  BP(mean): --  RR: 18 (04 Aug 2023 05:35) (16 - 18)  SpO2: 98% (04 Aug 2023 05:35) (96% - 98%)    Parameters below as of 04 Aug 2023 05:35  Patient On (Oxygen Delivery Method): room air        PHYSICAL EXAMINATION:  GENERAL: NAD, well built  HEAD:  Atraumatic, Normocephalic  EYES:  conjunctiva and sclera clear  CHEST/LUNG: Clear to auscultation. No rales, rhonchi, wheezing, or rubs  HEART: Regular rate and rhythm; No murmurs, rubs, or gallops  ABDOMEN: Soft, Nontender, Nondistended; Bowel sounds present  NERVOUS SYSTEM:  Alert & Oriented X1 to self  EXTREMITIES:  2+ Peripheral Pulses, No clubbing, cyanosis, or edema; quardiplegic  SKIN: warm dry; small serous sacral wound, nonstaged                          11.0   4.55  )-----------( 271      ( 04 Aug 2023 06:40 )             36.0     08-04    142  |  110<H>  |  14  ----------------------------<  112<H>  3.6   |  26  |  0.52    Ca    8.8      04 Aug 2023 06:40  Phos  3.0     08-04  Mg     2.2     08-04                CAPILLARY BLOOD GLUCOSE      RADIOLOGY & ADDITIONAL TESTS:                   PGY-1 Progress Note discussed with attending    PAGER #: [[phone]] TILL 5:00 PM  PLEASE CONTACT ON CALL TEAM:  - On Call Team (Please refer to Claudine) FROM 5:00 PM - 8:30PM  - Nightfloat Team FROM 8:30 -7:30 AM    CHIEF COMPLAINT & BRIEF HOSPITAL COURSE: No acute events overnight. Persistent loss of appetite. Denies nausea, vomiting, abdominal pain, fever.    INTERVAL HPI/OVERNIGHT EVENTS:   MEDICATIONS  (STANDING):  cefTRIAXone   IVPB 1000 milliGRAM(s) IV Intermittent every 24 hours  enoxaparin Injectable 40 milliGRAM(s) SubCutaneous every 24 hours    MEDICATIONS  (PRN):  acetaminophen     Tablet .. 650 milliGRAM(s) Oral every 6 hours PRN Temp greater or equal to 38C (100.4F), Mild Pain (1 - 3)      REVIEW OF SYSTEMS:  CONSTITUTIONAL: No fever, weight loss, or fatigue  RESPIRATORY: No shortness of breath  CARDIOVASCULAR: No chest pain  GASTROINTESTINAL: No abdominal pain. No nausea. No vomiting. No diarrhea. No constipation.  GENITOURINARY: No dysuria. No hematuria.  NEUROLOGICAL: No headaches  SKIN: No itching, burning, rashes; small sacral wound    Vital Signs Last 24 Hrs  T(C): 36.7 (04 Aug 2023 05:35), Max: 36.7 (03 Aug 2023 13:52)  T(F): 98 (04 Aug 2023 05:35), Max: 98 (03 Aug 2023 13:52)  HR: 64 (04 Aug 2023 05:35) (64 - 86)  BP: 120/68 (04 Aug 2023 05:35) (101/61 - 120/68)  BP(mean): --  RR: 18 (04 Aug 2023 05:35) (16 - 18)  SpO2: 98% (04 Aug 2023 05:35) (96% - 98%)    Parameters below as of 04 Aug 2023 05:35  Patient On (Oxygen Delivery Method): room air        PHYSICAL EXAMINATION:  GENERAL: NAD, thin  HEAD:  Atraumatic, Normocephalic  EYES:  conjunctiva and sclera clear  CHEST/LUNG: Clear to auscultation bilaterally. No rales, rhonchi, wheezing, or rubs  HEART: Regular rate and rhythm; No murmurs, rubs, or gallops  ABDOMEN: Soft, Nondistended; Bowel sounds present in all 4 quadrants. Diffuse tenderness to light palpation. Tenderness to deep palpation in LUQ,  NERVOUS SYSTEM:  Alert & Oriented X2 to person and place  EXTREMITIES:  2+ Peripheral Pulses, No clubbing, cyanosis, or edema; quadriplegic  SKIN: warm dry; small serous sacral wound, nonstaged, with overlying dressing intact                          11.0   4.55  )-----------( 271      ( 04 Aug 2023 06:40 )             36.0     08-04    142  |  110<H>  |  14  ----------------------------<  112<H>  3.6   |  26  |  0.52    Ca    8.8      04 Aug 2023 06:40  Phos  3.0     08-04  Mg     2.2     08-04                CAPILLARY BLOOD GLUCOSE      RADIOLOGY & ADDITIONAL TESTS:

## 2023-08-04 NOTE — PROGRESS NOTE ADULT - ASSESSMENT
Pt is a 84 y/o female bedbound (1.5 years) with a HHA 24hr/7days with a PMHx of chronic lower back pain, and recurrent UTIs presenting to the ED with AMS, multiple episodes of nonbillious, nonbloody vomiting, nausea, and abdominal pain, and admitted to medicine for further workup of acute metabolic encephalopathy secondary to UTI and cystitis.  Pt is a 84 y/o female bedbound (1.5 years) with a HHA 24hr/7days with a PMHx of chronic lower back pain and recurrent UTIs presenting to the ED with AMS, multiple episodes of nonbillious, nonbloody vomiting, nausea, and abdominal pain, and admitted to medicine for further workup of acute metabolic encephalopathy secondary to UTI and cystitis.

## 2023-08-05 LAB
-  AMIKACIN: SIGNIFICANT CHANGE UP
-  AMOXICILLIN/CLAVULANIC ACID: SIGNIFICANT CHANGE UP
-  AMPICILLIN/SULBACTAM: SIGNIFICANT CHANGE UP
-  AMPICILLIN: SIGNIFICANT CHANGE UP
-  AZTREONAM: SIGNIFICANT CHANGE UP
-  CEFAZOLIN: SIGNIFICANT CHANGE UP
-  CEFEPIME: SIGNIFICANT CHANGE UP
-  CEFOXITIN: SIGNIFICANT CHANGE UP
-  CEFTRIAXONE: SIGNIFICANT CHANGE UP
-  CEFUROXIME: SIGNIFICANT CHANGE UP
-  CIPROFLOXACIN: SIGNIFICANT CHANGE UP
-  ERTAPENEM: SIGNIFICANT CHANGE UP
-  GENTAMICIN: SIGNIFICANT CHANGE UP
-  IMIPENEM: SIGNIFICANT CHANGE UP
-  LEVOFLOXACIN: SIGNIFICANT CHANGE UP
-  MEROPENEM: SIGNIFICANT CHANGE UP
-  NITROFURANTOIN: SIGNIFICANT CHANGE UP
-  PIPERACILLIN/TAZOBACTAM: SIGNIFICANT CHANGE UP
-  TOBRAMYCIN: SIGNIFICANT CHANGE UP
-  TRIMETHOPRIM/SULFAMETHOXAZOLE: SIGNIFICANT CHANGE UP
ANION GAP SERPL CALC-SCNC: 7 MMOL/L — SIGNIFICANT CHANGE UP (ref 5–17)
BASOPHILS # BLD AUTO: 0.02 K/UL — SIGNIFICANT CHANGE UP (ref 0–0.2)
BASOPHILS NFR BLD AUTO: 0.4 % — SIGNIFICANT CHANGE UP (ref 0–2)
BUN SERPL-MCNC: 9 MG/DL — SIGNIFICANT CHANGE UP (ref 7–18)
CALCIUM SERPL-MCNC: 10.4 MG/DL — SIGNIFICANT CHANGE UP (ref 8.4–10.5)
CHLORIDE SERPL-SCNC: 110 MMOL/L — HIGH (ref 96–108)
CO2 SERPL-SCNC: 25 MMOL/L — SIGNIFICANT CHANGE UP (ref 22–31)
CREAT SERPL-MCNC: 0.39 MG/DL — LOW (ref 0.5–1.3)
CULTURE RESULTS: SIGNIFICANT CHANGE UP
EGFR: 98 ML/MIN/1.73M2 — SIGNIFICANT CHANGE UP
EOSINOPHIL # BLD AUTO: 0.15 K/UL — SIGNIFICANT CHANGE UP (ref 0–0.5)
EOSINOPHIL NFR BLD AUTO: 3 % — SIGNIFICANT CHANGE UP (ref 0–6)
GLUCOSE SERPL-MCNC: 98 MG/DL — SIGNIFICANT CHANGE UP (ref 70–99)
HCT VFR BLD CALC: 35.8 % — SIGNIFICANT CHANGE UP (ref 34.5–45)
HGB BLD-MCNC: 11.1 G/DL — LOW (ref 11.5–15.5)
IMM GRANULOCYTES NFR BLD AUTO: 0.4 % — SIGNIFICANT CHANGE UP (ref 0–0.9)
LYMPHOCYTES # BLD AUTO: 1.67 K/UL — SIGNIFICANT CHANGE UP (ref 1–3.3)
LYMPHOCYTES # BLD AUTO: 33.9 % — SIGNIFICANT CHANGE UP (ref 13–44)
MAGNESIUM SERPL-MCNC: 2.2 MG/DL — SIGNIFICANT CHANGE UP (ref 1.6–2.6)
MCHC RBC-ENTMCNC: 25.7 PG — LOW (ref 27–34)
MCHC RBC-ENTMCNC: 31 GM/DL — LOW (ref 32–36)
MCV RBC AUTO: 82.9 FL — SIGNIFICANT CHANGE UP (ref 80–100)
METHOD TYPE: SIGNIFICANT CHANGE UP
MONOCYTES # BLD AUTO: 0.57 K/UL — SIGNIFICANT CHANGE UP (ref 0–0.9)
MONOCYTES NFR BLD AUTO: 11.6 % — SIGNIFICANT CHANGE UP (ref 2–14)
NEUTROPHILS # BLD AUTO: 2.49 K/UL — SIGNIFICANT CHANGE UP (ref 1.8–7.4)
NEUTROPHILS NFR BLD AUTO: 50.7 % — SIGNIFICANT CHANGE UP (ref 43–77)
NRBC # BLD: 0 /100 WBCS — SIGNIFICANT CHANGE UP (ref 0–0)
ORGANISM # SPEC MICROSCOPIC CNT: SIGNIFICANT CHANGE UP
ORGANISM # SPEC MICROSCOPIC CNT: SIGNIFICANT CHANGE UP
PHOSPHATE SERPL-MCNC: 2.8 MG/DL — SIGNIFICANT CHANGE UP (ref 2.5–4.5)
PLATELET # BLD AUTO: 284 K/UL — SIGNIFICANT CHANGE UP (ref 150–400)
POTASSIUM SERPL-MCNC: 3.5 MMOL/L — SIGNIFICANT CHANGE UP (ref 3.5–5.3)
POTASSIUM SERPL-SCNC: 3.5 MMOL/L — SIGNIFICANT CHANGE UP (ref 3.5–5.3)
RBC # BLD: 4.32 M/UL — SIGNIFICANT CHANGE UP (ref 3.8–5.2)
RBC # FLD: 15 % — HIGH (ref 10.3–14.5)
SODIUM SERPL-SCNC: 142 MMOL/L — SIGNIFICANT CHANGE UP (ref 135–145)
SPECIMEN SOURCE: SIGNIFICANT CHANGE UP
WBC # BLD: 4.92 K/UL — SIGNIFICANT CHANGE UP (ref 3.8–10.5)
WBC # FLD AUTO: 4.92 K/UL — SIGNIFICANT CHANGE UP (ref 3.8–10.5)

## 2023-08-05 PROCEDURE — 99232 SBSQ HOSP IP/OBS MODERATE 35: CPT | Mod: GC

## 2023-08-05 PROCEDURE — 74018 RADEX ABDOMEN 1 VIEW: CPT | Mod: 26

## 2023-08-05 RX ORDER — ONDANSETRON 8 MG/1
4 TABLET, FILM COATED ORAL ONCE
Refills: 0 | Status: DISCONTINUED | OUTPATIENT
Start: 2023-08-05 | End: 2023-08-06

## 2023-08-05 RX ADMIN — CEFTRIAXONE 100 MILLIGRAM(S): 500 INJECTION, POWDER, FOR SOLUTION INTRAMUSCULAR; INTRAVENOUS at 06:13

## 2023-08-05 RX ADMIN — ENOXAPARIN SODIUM 40 MILLIGRAM(S): 100 INJECTION SUBCUTANEOUS at 17:16

## 2023-08-05 NOTE — PROGRESS NOTE ADULT - SUBJECTIVE AND OBJECTIVE BOX
PGY-1 Progress Note discussed with attending    PAGER #: [[phone]] TILL 5:00 PM  PLEASE CONTACT ON CALL TEAM:  - On Call Team (Please refer to Claudine) FROM 5:00 PM - 8:30PM  - Nightfloat Team FROM 8:30 -7:30 AM    CHIEF COMPLAINT & BRIEF HOSPITAL COURSE:    INTERVAL HPI/OVERNIGHT EVENTS:   MEDICATIONS  (STANDING):  enoxaparin Injectable 40 milliGRAM(s) SubCutaneous every 24 hours  ondansetron Injectable 4 milliGRAM(s) IV Push once    MEDICATIONS  (PRN):  acetaminophen     Tablet .. 650 milliGRAM(s) Oral every 6 hours PRN Temp greater or equal to 38C (100.4F), Mild Pain (1 - 3)      REVIEW OF SYSTEMS:  CONSTITUTIONAL: No fever, weight loss, or fatigue  RESPIRATORY: No shortness of breath  CARDIOVASCULAR: No chest pain  GASTROINTESTINAL: No abdominal pain.  GENITOURINARY: No dysuria  NEUROLOGICAL: No headaches, AOx2 (self, place)  SKIN: No itching, burning, rashes    Vital Signs Last 24 Hrs  T(C): 36.7 (05 Aug 2023 04:30), Max: 36.7 (04 Aug 2023 19:55)  T(F): 98 (05 Aug 2023 04:30), Max: 98 (04 Aug 2023 19:55)  HR: 67 (05 Aug 2023 04:30) (67 - 70)  BP: 121/74 (05 Aug 2023 04:30) (111/68 - 121/74)  BP(mean): --  RR: 18 (05 Aug 2023 04:30) (16 - 18)  SpO2: 98% (05 Aug 2023 04:30) (98% - 98%)    Parameters below as of 05 Aug 2023 04:30  Patient On (Oxygen Delivery Method): room air        PHYSICAL EXAMINATION:  GENERAL: NAD, well built  HEAD:  Atraumatic, Normocephalic  EYES:  conjunctiva and sclera clear  CHEST/LUNG: Clear to auscultation. No rales, rhonchi, wheezing, or rubs  HEART: Regular rate and rhythm; No murmurs, rubs, or gallops  ABDOMEN: Soft, LLQ tenderness, Nondistended; Bowel sounds present  NERVOUS SYSTEM:  Alert & Oriented X2 (self, place)  EXTREMITIES:  2+ Peripheral Pulses, No clubbing, cyanosis, or edema  SKIN: warm dry                          11.1   4.92  )-----------( 284      ( 05 Aug 2023 06:23 )             35.8     08-05    142  |  110<H>  |  9   ----------------------------<  98  3.5   |  25  |  0.39<L>    Ca    10.4      05 Aug 2023 06:23  Phos  2.8     08-05  Mg     2.2     08-05                CAPILLARY BLOOD GLUCOSE      RADIOLOGY & ADDITIONAL TESTS:                   PGY-1 Progress Note discussed with attending    PAGER #: [[phone]] TILL 5:00 PM  PLEASE CONTACT ON CALL TEAM:  - On Call Team (Please refer to Claudine) FROM 5:00 PM - 8:30PM  - Nightfloat Team FROM 8:30 -7:30 AM    CHIEF COMPLAINT & BRIEF HOSPITAL COURSE:    INTERVAL HPI/OVERNIGHT EVENTS: No acute events overnight. Patient feels well.  MEDICATIONS  (STANDING):  enoxaparin Injectable 40 milliGRAM(s) SubCutaneous every 24 hours  ondansetron Injectable 4 milliGRAM(s) IV Push once    MEDICATIONS  (PRN):  acetaminophen     Tablet .. 650 milliGRAM(s) Oral every 6 hours PRN Temp greater or equal to 38C (100.4F), Mild Pain (1 - 3)      REVIEW OF SYSTEMS:  CONSTITUTIONAL: No fever, weight loss, or fatigue  RESPIRATORY: No shortness of breath  CARDIOVASCULAR: No chest pain  GASTROINTESTINAL: No abdominal pain.  GENITOURINARY: No dysuria  NEUROLOGICAL: No headaches, AOx2 (self, place)  SKIN: No itching, burning, rashes    Vital Signs Last 24 Hrs  T(C): 36.7 (05 Aug 2023 04:30), Max: 36.7 (04 Aug 2023 19:55)  T(F): 98 (05 Aug 2023 04:30), Max: 98 (04 Aug 2023 19:55)  HR: 67 (05 Aug 2023 04:30) (67 - 70)  BP: 121/74 (05 Aug 2023 04:30) (111/68 - 121/74)  BP(mean): --  RR: 18 (05 Aug 2023 04:30) (16 - 18)  SpO2: 98% (05 Aug 2023 04:30) (98% - 98%)    Parameters below as of 05 Aug 2023 04:30  Patient On (Oxygen Delivery Method): room air        PHYSICAL EXAMINATION:  GENERAL: NAD, well built  HEAD:  Atraumatic, Normocephalic  EYES:  conjunctiva and sclera clear  CHEST/LUNG: Clear to auscultation. No rales, rhonchi, wheezing, or rubs  HEART: Regular rate and rhythm; No murmurs, rubs, or gallops  ABDOMEN: Soft, LLQ tenderness, Nondistended; Bowel sounds present  NERVOUS SYSTEM:  Alert & Oriented X2 (self, place)  EXTREMITIES:  2+ Peripheral Pulses, No clubbing, cyanosis, or edema  SKIN: warm dry                          11.1   4.92  )-----------( 284      ( 05 Aug 2023 06:23 )             35.8     08-05    142  |  110<H>  |  9   ----------------------------<  98  3.5   |  25  |  0.39<L>    Ca    10.4      05 Aug 2023 06:23  Phos  2.8     08-05  Mg     2.2     08-05                CAPILLARY BLOOD GLUCOSE      RADIOLOGY & ADDITIONAL TESTS:

## 2023-08-05 NOTE — PROGRESS NOTE ADULT - PROBLEM SELECTOR PLAN 1
P/w AMS, vomiting, abdominal pain, and subjective fevers  Likely secondary to UTI and cystitis  F/U urine Cx and blood cx for sensitivities   Lactate negative  RVP negative  C/w gentle fluids.   CT A/P showed cystitis  d3/3 CTX completed   Trend fever, leukocytosis  Questionable history of thyroid disease. F/U TSH.  Zofran as needed.    e coli pansensitive

## 2023-08-05 NOTE — PROGRESS NOTE ADULT - ATTENDING COMMENTS
Patient seen and examined. Case discussed with housestaff. Communicated with patient via  #[id number].  In brief, this is a 84 yo F with functional quadriplegia, dementia, history of multiple UTI's who presents after developing abdominal pain with associated NBNB vomiting episodes. The son also noted that the patient was more confused than usual and as such decided to bring the patient to the ED. In the ED, patient underwent a CT A/P that was concerning for acute cystitis and a UA was also positive for possible infection. Urine culture was positive for pan-sensitive E. coli and Aerococcus. Patient has completed 3 days of antibiotics for her acute cystitis at this time and blood cultures remain NTD. Plan for likely discharge home tomorrow with resumption of home care services. Patient also has a community-acquired unstageable sacral ulcer. Appreciate wound care follow-up. Will continue medical honey with foam dressings at this time. Sample of medical honey and foam dressings provided to the patient's son at bedside. Remaining care as noted above.

## 2023-08-05 NOTE — PROGRESS NOTE ADULT - PROBLEM SELECTOR PLAN 3
P/w AMS  As per son, at baseline pt mentates at AOx2.   Pt currently AOx1  Hx of thyroid disease, unclear  Serum TSH WNL (8/3: 0.40)  Not on any home medication. Clindamycin Counseling: I counseled the patient regarding use of clindamycin as an antibiotic for prophylactic and/or therapeutic purposes. Clindamycin is active against numerous classes of bacteria, including skin bacteria. Side effects may include nausea, diarrhea, gastrointestinal upset, rash, hives, yeast infections, and in rare cases, colitis.

## 2023-08-06 DIAGNOSIS — S31.000A UNSPECIFIED OPEN WOUND OF LOWER BACK AND PELVIS WITHOUT PENETRATION INTO RETROPERITONEUM, INITIAL ENCOUNTER: ICD-10-CM

## 2023-08-06 LAB
ANION GAP SERPL CALC-SCNC: 7 MMOL/L — SIGNIFICANT CHANGE UP (ref 5–17)
BASOPHILS # BLD AUTO: 0.02 K/UL — SIGNIFICANT CHANGE UP (ref 0–0.2)
BASOPHILS NFR BLD AUTO: 0.4 % — SIGNIFICANT CHANGE UP (ref 0–2)
BUN SERPL-MCNC: 14 MG/DL — SIGNIFICANT CHANGE UP (ref 7–18)
CALCIUM SERPL-MCNC: 9.3 MG/DL — SIGNIFICANT CHANGE UP (ref 8.4–10.5)
CHLORIDE SERPL-SCNC: 111 MMOL/L — HIGH (ref 96–108)
CO2 SERPL-SCNC: 25 MMOL/L — SIGNIFICANT CHANGE UP (ref 22–31)
CREAT SERPL-MCNC: 0.39 MG/DL — LOW (ref 0.5–1.3)
EGFR: 98 ML/MIN/1.73M2 — SIGNIFICANT CHANGE UP
EOSINOPHIL # BLD AUTO: 0.14 K/UL — SIGNIFICANT CHANGE UP (ref 0–0.5)
EOSINOPHIL NFR BLD AUTO: 3.1 % — SIGNIFICANT CHANGE UP (ref 0–6)
GLUCOSE SERPL-MCNC: 108 MG/DL — HIGH (ref 70–99)
HCT VFR BLD CALC: 35.6 % — SIGNIFICANT CHANGE UP (ref 34.5–45)
HGB BLD-MCNC: 10.9 G/DL — LOW (ref 11.5–15.5)
IMM GRANULOCYTES NFR BLD AUTO: 0.4 % — SIGNIFICANT CHANGE UP (ref 0–0.9)
LYMPHOCYTES # BLD AUTO: 1.25 K/UL — SIGNIFICANT CHANGE UP (ref 1–3.3)
LYMPHOCYTES # BLD AUTO: 27.5 % — SIGNIFICANT CHANGE UP (ref 13–44)
MAGNESIUM SERPL-MCNC: 2.4 MG/DL — SIGNIFICANT CHANGE UP (ref 1.6–2.6)
MCHC RBC-ENTMCNC: 25.4 PG — LOW (ref 27–34)
MCHC RBC-ENTMCNC: 30.6 GM/DL — LOW (ref 32–36)
MCV RBC AUTO: 83 FL — SIGNIFICANT CHANGE UP (ref 80–100)
MONOCYTES # BLD AUTO: 0.53 K/UL — SIGNIFICANT CHANGE UP (ref 0–0.9)
MONOCYTES NFR BLD AUTO: 11.7 % — SIGNIFICANT CHANGE UP (ref 2–14)
NEUTROPHILS # BLD AUTO: 2.58 K/UL — SIGNIFICANT CHANGE UP (ref 1.8–7.4)
NEUTROPHILS NFR BLD AUTO: 56.9 % — SIGNIFICANT CHANGE UP (ref 43–77)
NRBC # BLD: 0 /100 WBCS — SIGNIFICANT CHANGE UP (ref 0–0)
PHOSPHATE SERPL-MCNC: 2.7 MG/DL — SIGNIFICANT CHANGE UP (ref 2.5–4.5)
PLATELET # BLD AUTO: 277 K/UL — SIGNIFICANT CHANGE UP (ref 150–400)
POTASSIUM SERPL-MCNC: 3.4 MMOL/L — LOW (ref 3.5–5.3)
POTASSIUM SERPL-SCNC: 3.4 MMOL/L — LOW (ref 3.5–5.3)
RBC # BLD: 4.29 M/UL — SIGNIFICANT CHANGE UP (ref 3.8–5.2)
RBC # FLD: 15.4 % — HIGH (ref 10.3–14.5)
SODIUM SERPL-SCNC: 143 MMOL/L — SIGNIFICANT CHANGE UP (ref 135–145)
WBC # BLD: 4.54 K/UL — SIGNIFICANT CHANGE UP (ref 3.8–10.5)
WBC # FLD AUTO: 4.54 K/UL — SIGNIFICANT CHANGE UP (ref 3.8–10.5)

## 2023-08-06 PROCEDURE — 99232 SBSQ HOSP IP/OBS MODERATE 35: CPT

## 2023-08-06 RX ORDER — POTASSIUM CHLORIDE 20 MEQ
40 PACKET (EA) ORAL ONCE
Refills: 0 | Status: DISCONTINUED | OUTPATIENT
Start: 2023-08-06 | End: 2023-08-06

## 2023-08-06 RX ORDER — POTASSIUM CHLORIDE 20 MEQ
40 PACKET (EA) ORAL ONCE
Refills: 0 | Status: COMPLETED | OUTPATIENT
Start: 2023-08-06 | End: 2023-08-06

## 2023-08-06 RX ADMIN — ENOXAPARIN SODIUM 40 MILLIGRAM(S): 100 INJECTION SUBCUTANEOUS at 18:05

## 2023-08-06 RX ADMIN — Medication 40 MILLIEQUIVALENT(S): at 18:05

## 2023-08-06 NOTE — PROGRESS NOTE ADULT - NUTRITIONAL ASSESSMENT
This patient has been assessed with a concern for Malnutrition and has been determined to have a diagnosis/diagnoses of Severe protein-calorie malnutrition and Underweight (BMI < 19).    This patient is being managed with:   Diet Regular-  Supplement Feeding Modality:  Oral  Ensure Enlive Cans or Servings Per Day:  1       Frequency:  Three Times a day  Entered: Aug  4 2023 12:16PM    Diet Regular-  Entered: Aug  2 2023  9:39AM    The following pending diet order is being considered for treatment of Severe protein-calorie malnutrition and Underweight (BMI < 19):null
This patient has been assessed with a concern for Malnutrition and has been determined to have a diagnosis/diagnoses of Severe protein-calorie malnutrition and Underweight (BMI < 19).    This patient is being managed with:   Diet Regular-  Supplement Feeding Modality:  Oral  Ensure Enlive Cans or Servings Per Day:  1       Frequency:  Three Times a day  Entered: Aug  4 2023 12:16PM    Diet Regular-  Entered: Aug  2 2023  9:39AM    The following pending diet order is being considered for treatment of Severe protein-calorie malnutrition and Underweight (BMI < 19):null

## 2023-08-06 NOTE — PROGRESS NOTE ADULT - SUBJECTIVE AND OBJECTIVE BOX
[facility] Medicine    Patient is a 83y old  Female who presents with a chief complaint of Urinary tract infection     (04 Aug 2023 11:31)      SUBJECTIVE / OVERNIGHT EVENTS: No acute events overnight. Communicated with patient via  #[id number]. Plan was for discharge home today but later determined by CM that the patient's grandson is not the HHA and there is an additional aide provided by [facility]. As such, patient will not be able to return home today. Patient offers no complaints and wants to go home.    MEDICATIONS  (STANDING):  enoxaparin Injectable 40 milliGRAM(s) SubCutaneous every 24 hours  potassium chloride   Powder 40 milliEquivalent(s) Oral once    MEDICATIONS  (PRN):  acetaminophen     Tablet .. 650 milliGRAM(s) Oral every 6 hours PRN Temp greater or equal to 38C (100.4F), Mild Pain (1 - 3)      Vital Signs Last 24 Hrs  T(C): 36.1 (08-06-23 @ 14:02)  T(F): 97 (08-06-23 @ 14:02), Max: 98 (08-05-23 @ 19:44)  HR: 74 (08-06-23 @ 14:02) (71 - 77)  BP: 106/61 (08-06-23 @ 14:02)  BP(mean): --  RR: 17 (08-06-23 @ 14:02) (15 - 17)  SpO2: 99% (08-06-23 @ 14:02) (97% - 99%)  Wt(kg): --    CAPILLARY BLOOD GLUCOSE        I&O's Summary      PHYSICAL EXAM:  GENERAL: NAD, cachectic, in good spirits  HEAD:  Atraumatic, Normocephalic  EYES:  conjunctiva and sclera clear  NECK: Supple, No JVD  CHEST/LUNG: Clear to auscultation bilaterally; No wheeze, rhonchi, rales  HEART: Regular rate and rhythm; No murmurs, rubs, or gallops  ABDOMEN: Soft, Nontender, Nondistended; Bowel sounds present  EXTREMITIES:  2+ Peripheral Pulses, No clubbing, cyanosis, or edema  PSYCH: AAOx2 (person, place)  NEUROLOGY: non-focal  SKIN: +unstageable sacral wound    LABS:                        10.9   4.54  )-----------( 277      ( 06 Aug 2023 06:46 )             35.6     08-06    143  |  111<H>  |  14  ----------------------------<  108<H>  3.4<L>   |  25  |  0.39<L>    Ca    9.3      06 Aug 2023 06:46  Phos  2.7     08-06  Mg     2.4     08-06            Urinalysis Basic - ( 06 Aug 2023 06:46 )    Color: x / Appearance: x / SG: x / pH: x  Gluc: 108 mg/dL / Ketone: x  / Bili: x / Urobili: x   Blood: x / Protein: x / Nitrite: x   Leuk Esterase: x / RBC: x / WBC x   Sq Epi: x / Non Sq Epi: x / Bacteria: x        RADIOLOGY & ADDITIONAL TESTS:    Imaging Personally Reviewed:    Consultant(s) Notes Reviewed:      Care Discussed with Consultants/Other Providers:    Assessment and Plan:

## 2023-08-06 NOTE — PROGRESS NOTE ADULT - REASON FOR ADMISSION
acute metabolic encephalopathy secondary to UTI
Acute metabolic encephalopathy 2/2 UTI, cystitis
UTI

## 2023-08-06 NOTE — PROGRESS NOTE ADULT - ASSESSMENT
82 yo bedbound F with chronic lower back pain, recurrent UTI's, and severe protein calorie malnutrition who presents with NBNB vomiting and abdominal pain, subsequently found to have acute cystitis. Urine cultures growing E. coli and Aerococcus.

## 2023-08-06 NOTE — PROGRESS NOTE ADULT - PROBLEM SELECTOR PROBLEM 1
Infectious encephalopathy

## 2023-08-06 NOTE — PROGRESS NOTE ADULT - PROBLEM SELECTOR PLAN 2
P/w AMS, vomiting, abdominal pain, and subjective fevers likely secondary to cystitis  -Blood cultures are NTD  -Urine cultures growing E. coli and Aerococcus now s/p 3 days of ceftriaxone  -TSH WNL

## 2023-08-06 NOTE — PROGRESS NOTE ADULT - PROBLEM SELECTOR PLAN 4
Patient with unstageable sacral wound and seen by Dr. Ray  -Recommends irrigating the wound with NS, dressing with med honey, and then Foam dressing  -Patient should follow-up with Dr. Griffiths at [address] [place] [address] for additional wound care

## 2023-08-06 NOTE — PROGRESS NOTE ADULT - PROBLEM SELECTOR PLAN 1
P/w AMS, vomiting, abdominal pain, and subjective fevers likely secondary to cystitis  -Blood cultures are NTD  -Urine cultures growing E. coli and Aerococcus now s/p 3 days of ceftriaxone  -TSH WNL  -Now back at baseline AOx2 (to person and place)

## 2023-08-06 NOTE — PROGRESS NOTE ADULT - PROBLEM SELECTOR PLAN 5
Presented with AMS of AOx1, now back to baseline AOx2  -TSH WNL
Bed bound at baseline  Lovenox subq for DVT ppx

## 2023-08-07 VITALS
DIASTOLIC BLOOD PRESSURE: 61 MMHG | HEART RATE: 67 BPM | TEMPERATURE: 98 F | SYSTOLIC BLOOD PRESSURE: 104 MMHG | OXYGEN SATURATION: 98 %

## 2023-08-07 LAB
ANION GAP SERPL CALC-SCNC: 7 MMOL/L — SIGNIFICANT CHANGE UP (ref 5–17)
BASOPHILS # BLD AUTO: 0.03 K/UL — SIGNIFICANT CHANGE UP (ref 0–0.2)
BASOPHILS NFR BLD AUTO: 0.6 % — SIGNIFICANT CHANGE UP (ref 0–2)
BUN SERPL-MCNC: 20 MG/DL — HIGH (ref 7–18)
CALCIUM SERPL-MCNC: 9 MG/DL — SIGNIFICANT CHANGE UP (ref 8.4–10.5)
CHLORIDE SERPL-SCNC: 111 MMOL/L — HIGH (ref 96–108)
CO2 SERPL-SCNC: 26 MMOL/L — SIGNIFICANT CHANGE UP (ref 22–31)
CREAT SERPL-MCNC: 0.43 MG/DL — LOW (ref 0.5–1.3)
CULTURE RESULTS: SIGNIFICANT CHANGE UP
CULTURE RESULTS: SIGNIFICANT CHANGE UP
EGFR: 96 ML/MIN/1.73M2 — SIGNIFICANT CHANGE UP
EOSINOPHIL # BLD AUTO: 0.16 K/UL — SIGNIFICANT CHANGE UP (ref 0–0.5)
EOSINOPHIL NFR BLD AUTO: 3.4 % — SIGNIFICANT CHANGE UP (ref 0–6)
GLUCOSE SERPL-MCNC: 97 MG/DL — SIGNIFICANT CHANGE UP (ref 70–99)
HCT VFR BLD CALC: 34.7 % — SIGNIFICANT CHANGE UP (ref 34.5–45)
HGB BLD-MCNC: 10.6 G/DL — LOW (ref 11.5–15.5)
IMM GRANULOCYTES NFR BLD AUTO: 0.2 % — SIGNIFICANT CHANGE UP (ref 0–0.9)
LYMPHOCYTES # BLD AUTO: 1.41 K/UL — SIGNIFICANT CHANGE UP (ref 1–3.3)
LYMPHOCYTES # BLD AUTO: 30.1 % — SIGNIFICANT CHANGE UP (ref 13–44)
MAGNESIUM SERPL-MCNC: 2.3 MG/DL — SIGNIFICANT CHANGE UP (ref 1.6–2.6)
MCHC RBC-ENTMCNC: 25.7 PG — LOW (ref 27–34)
MCHC RBC-ENTMCNC: 30.5 GM/DL — LOW (ref 32–36)
MCV RBC AUTO: 84 FL — SIGNIFICANT CHANGE UP (ref 80–100)
MONOCYTES # BLD AUTO: 0.61 K/UL — SIGNIFICANT CHANGE UP (ref 0–0.9)
MONOCYTES NFR BLD AUTO: 13 % — SIGNIFICANT CHANGE UP (ref 2–14)
NEUTROPHILS # BLD AUTO: 2.47 K/UL — SIGNIFICANT CHANGE UP (ref 1.8–7.4)
NEUTROPHILS NFR BLD AUTO: 52.7 % — SIGNIFICANT CHANGE UP (ref 43–77)
NRBC # BLD: 0 /100 WBCS — SIGNIFICANT CHANGE UP (ref 0–0)
PHOSPHATE SERPL-MCNC: 2.6 MG/DL — SIGNIFICANT CHANGE UP (ref 2.5–4.5)
PLATELET # BLD AUTO: 261 K/UL — SIGNIFICANT CHANGE UP (ref 150–400)
POTASSIUM SERPL-MCNC: 3.6 MMOL/L — SIGNIFICANT CHANGE UP (ref 3.5–5.3)
POTASSIUM SERPL-SCNC: 3.6 MMOL/L — SIGNIFICANT CHANGE UP (ref 3.5–5.3)
RBC # BLD: 4.13 M/UL — SIGNIFICANT CHANGE UP (ref 3.8–5.2)
RBC # FLD: 15.5 % — HIGH (ref 10.3–14.5)
SODIUM SERPL-SCNC: 144 MMOL/L — SIGNIFICANT CHANGE UP (ref 135–145)
SPECIMEN SOURCE: SIGNIFICANT CHANGE UP
SPECIMEN SOURCE: SIGNIFICANT CHANGE UP
WBC # BLD: 4.69 K/UL — SIGNIFICANT CHANGE UP (ref 3.8–10.5)
WBC # FLD AUTO: 4.69 K/UL — SIGNIFICANT CHANGE UP (ref 3.8–10.5)

## 2023-08-07 PROCEDURE — 80048 BASIC METABOLIC PNL TOTAL CA: CPT

## 2023-08-07 PROCEDURE — 93005 ELECTROCARDIOGRAM TRACING: CPT

## 2023-08-07 PROCEDURE — 85730 THROMBOPLASTIN TIME PARTIAL: CPT

## 2023-08-07 PROCEDURE — 96375 TX/PRO/DX INJ NEW DRUG ADDON: CPT

## 2023-08-07 PROCEDURE — 96365 THER/PROPH/DIAG IV INF INIT: CPT

## 2023-08-07 PROCEDURE — 80053 COMPREHEN METABOLIC PANEL: CPT

## 2023-08-07 PROCEDURE — 74018 RADEX ABDOMEN 1 VIEW: CPT

## 2023-08-07 PROCEDURE — 99285 EMERGENCY DEPT VISIT HI MDM: CPT | Mod: 25

## 2023-08-07 PROCEDURE — 84443 ASSAY THYROID STIM HORMONE: CPT

## 2023-08-07 PROCEDURE — 87077 CULTURE AEROBIC IDENTIFY: CPT

## 2023-08-07 PROCEDURE — 83735 ASSAY OF MAGNESIUM: CPT

## 2023-08-07 PROCEDURE — 83605 ASSAY OF LACTIC ACID: CPT

## 2023-08-07 PROCEDURE — 0225U NFCT DS DNA&RNA 21 SARSCOV2: CPT

## 2023-08-07 PROCEDURE — 87186 SC STD MICRODIL/AGAR DIL: CPT

## 2023-08-07 PROCEDURE — 84100 ASSAY OF PHOSPHORUS: CPT

## 2023-08-07 PROCEDURE — 85025 COMPLETE CBC W/AUTO DIFF WBC: CPT

## 2023-08-07 PROCEDURE — 99239 HOSP IP/OBS DSCHRG MGMT >30: CPT | Mod: GC

## 2023-08-07 PROCEDURE — 70450 CT HEAD/BRAIN W/O DYE: CPT | Mod: MA

## 2023-08-07 PROCEDURE — 85610 PROTHROMBIN TIME: CPT

## 2023-08-07 PROCEDURE — 84484 ASSAY OF TROPONIN QUANT: CPT

## 2023-08-07 PROCEDURE — 71045 X-RAY EXAM CHEST 1 VIEW: CPT

## 2023-08-07 PROCEDURE — 87086 URINE CULTURE/COLONY COUNT: CPT

## 2023-08-07 PROCEDURE — 87040 BLOOD CULTURE FOR BACTERIA: CPT

## 2023-08-07 PROCEDURE — 81001 URINALYSIS AUTO W/SCOPE: CPT

## 2023-08-07 PROCEDURE — 36415 COLL VENOUS BLD VENIPUNCTURE: CPT

## 2023-08-07 PROCEDURE — 74177 CT ABD & PELVIS W/CONTRAST: CPT | Mod: MA

## 2023-08-07 PROCEDURE — 85027 COMPLETE CBC AUTOMATED: CPT

## 2023-08-07 PROCEDURE — 96361 HYDRATE IV INFUSION ADD-ON: CPT

## 2023-08-07 NOTE — DISCHARGE NOTE NURSING/CASE MANAGEMENT/SOCIAL WORK - PATIENT PORTAL LINK FT
You can access the FollowMyHealth Patient Portal offered by [facility] by registering at the following website: [email or web address]. By joining [facility]’s FollowMyHealth portal, you will also be able to view your health information using other applications (apps) compatible with our system.

## 2023-08-07 NOTE — DISCHARGE NOTE NURSING/CASE MANAGEMENT/SOCIAL WORK - NSDCPEFALRISK_GEN_ALL_CORE
For information on Fall & Injury Prevention, visit: [email or web address] OR  [email or web address] OR  https://www.cdc.gov/steadi/patient.html

## 2023-08-10 ENCOUNTER — INPATIENT (INPATIENT)
Facility: HOSPITAL | Age: 83
LOS: 6 days | Discharge: NURSING, REHAB OR LONG-TERM CARE | DRG: 551 | End: 2023-08-17
Attending: HOSPITALIST | Admitting: STUDENT IN AN ORGANIZED HEALTH CARE EDUCATION/TRAINING PROGRAM
Payer: COMMERCIAL

## 2023-08-10 VITALS
OXYGEN SATURATION: 97 % | TEMPERATURE: 98 F | RESPIRATION RATE: 20 BRPM | SYSTOLIC BLOOD PRESSURE: 114 MMHG | DIASTOLIC BLOOD PRESSURE: 69 MMHG | HEART RATE: 74 BPM

## 2023-08-10 DIAGNOSIS — S31.000A UNSPECIFIED OPEN WOUND OF LOWER BACK AND PELVIS WITHOUT PENETRATION INTO RETROPERITONEUM, INITIAL ENCOUNTER: ICD-10-CM
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
## 2023-08-10 DIAGNOSIS — F03.90 UNSPECIFIED DEMENTIA, UNSPECIFIED SEVERITY, WITHOUT BEHAVIORAL DISTURBANCE, PSYCHOTIC DISTURBANCE, MOOD DISTURBANCE, AND ANXIETY: ICD-10-CM

## 2023-08-10 DIAGNOSIS — E44.0 MODERATE PROTEIN-CALORIE MALNUTRITION: ICD-10-CM

## 2023-08-10 LAB
ALBUMIN SERPL ELPH-MCNC: 3.8 G/DL — SIGNIFICANT CHANGE UP (ref 3.3–5)
ALP SERPL-CCNC: 61 U/L — SIGNIFICANT CHANGE UP (ref 40–120)
ALT FLD-CCNC: 14 U/L — SIGNIFICANT CHANGE UP (ref 10–45)
ANION GAP SERPL CALC-SCNC: 12 MMOL/L — SIGNIFICANT CHANGE UP (ref 5–17)
APTT BLD: 27.4 SEC — SIGNIFICANT CHANGE UP (ref 24.5–35.6)
AST SERPL-CCNC: 19 U/L — SIGNIFICANT CHANGE UP (ref 10–40)
BASE EXCESS BLDV CALC-SCNC: 2.4 MMOL/L — SIGNIFICANT CHANGE UP (ref -2–3)
BASOPHILS # BLD AUTO: 0.03 K/UL — SIGNIFICANT CHANGE UP (ref 0–0.2)
BASOPHILS NFR BLD AUTO: 0.5 % — SIGNIFICANT CHANGE UP (ref 0–2)
BILIRUB SERPL-MCNC: 0.3 MG/DL — SIGNIFICANT CHANGE UP (ref 0.2–1.2)
BUN SERPL-MCNC: 13 MG/DL — SIGNIFICANT CHANGE UP (ref 7–23)
CA-I SERPL-SCNC: 1.28 MMOL/L — SIGNIFICANT CHANGE UP (ref 1.15–1.33)
CALCIUM SERPL-MCNC: 9.6 MG/DL — SIGNIFICANT CHANGE UP (ref 8.4–10.5)
CHLORIDE BLDV-SCNC: 107 MMOL/L — SIGNIFICANT CHANGE UP (ref 96–108)
CHLORIDE SERPL-SCNC: 107 MMOL/L — SIGNIFICANT CHANGE UP (ref 96–108)
CO2 BLDV-SCNC: 28 MMOL/L — HIGH (ref 22–26)
CO2 SERPL-SCNC: 22 MMOL/L — SIGNIFICANT CHANGE UP (ref 22–31)
CREAT SERPL-MCNC: 0.41 MG/DL — LOW (ref 0.5–1.3)
EGFR: 97 ML/MIN/1.73M2 — SIGNIFICANT CHANGE UP
EOSINOPHIL # BLD AUTO: 0.15 K/UL — SIGNIFICANT CHANGE UP (ref 0–0.5)
EOSINOPHIL NFR BLD AUTO: 2.6 % — SIGNIFICANT CHANGE UP (ref 0–6)
GAS PNL BLDV: 139 MMOL/L — SIGNIFICANT CHANGE UP (ref 136–145)
GAS PNL BLDV: SIGNIFICANT CHANGE UP
GAS PNL BLDV: SIGNIFICANT CHANGE UP
GLUCOSE BLDV-MCNC: 98 MG/DL — SIGNIFICANT CHANGE UP (ref 70–99)
GLUCOSE SERPL-MCNC: 101 MG/DL — HIGH (ref 70–99)
HCO3 BLDV-SCNC: 26 MMOL/L — SIGNIFICANT CHANGE UP (ref 22–29)
HCT VFR BLD CALC: 36.9 % — SIGNIFICANT CHANGE UP (ref 34.5–45)
HCT VFR BLDA CALC: 34 % — LOW (ref 34.5–46.5)
HGB BLD CALC-MCNC: 11.4 G/DL — LOW (ref 11.7–16.1)
HGB BLD-MCNC: 11.5 G/DL — SIGNIFICANT CHANGE UP (ref 11.5–15.5)
IMM GRANULOCYTES NFR BLD AUTO: 0.3 % — SIGNIFICANT CHANGE UP (ref 0–0.9)
INR BLD: 1 RATIO — SIGNIFICANT CHANGE UP (ref 0.85–1.18)
LACTATE BLDV-MCNC: 1.1 MMOL/L — SIGNIFICANT CHANGE UP (ref 0.5–2)
LYMPHOCYTES # BLD AUTO: 1.39 K/UL — SIGNIFICANT CHANGE UP (ref 1–3.3)
LYMPHOCYTES # BLD AUTO: 24 % — SIGNIFICANT CHANGE UP (ref 13–44)
MCHC RBC-ENTMCNC: 26 PG — LOW (ref 27–34)
MCHC RBC-ENTMCNC: 31.2 GM/DL — LOW (ref 32–36)
MCV RBC AUTO: 83.3 FL — SIGNIFICANT CHANGE UP (ref 80–100)
MONOCYTES # BLD AUTO: 0.58 K/UL — SIGNIFICANT CHANGE UP (ref 0–0.9)
MONOCYTES NFR BLD AUTO: 10 % — SIGNIFICANT CHANGE UP (ref 2–14)
NEUTROPHILS # BLD AUTO: 3.61 K/UL — SIGNIFICANT CHANGE UP (ref 1.8–7.4)
NEUTROPHILS NFR BLD AUTO: 62.6 % — SIGNIFICANT CHANGE UP (ref 43–77)
NRBC # BLD: 0 /100 WBCS — SIGNIFICANT CHANGE UP (ref 0–0)
PCO2 BLDV: 38 MMHG — LOW (ref 39–42)
PH BLDV: 7.45 — HIGH (ref 7.32–7.43)
PLATELET # BLD AUTO: 281 K/UL — SIGNIFICANT CHANGE UP (ref 150–400)
PO2 BLDV: 53 MMHG — HIGH (ref 25–45)
POTASSIUM BLDV-SCNC: 4 MMOL/L — SIGNIFICANT CHANGE UP (ref 3.5–5.1)
POTASSIUM SERPL-MCNC: 4 MMOL/L — SIGNIFICANT CHANGE UP (ref 3.5–5.3)
POTASSIUM SERPL-SCNC: 4 MMOL/L — SIGNIFICANT CHANGE UP (ref 3.5–5.3)
PROT SERPL-MCNC: 6.9 G/DL — SIGNIFICANT CHANGE UP (ref 6–8.3)
PROTHROM AB SERPL-ACNC: 10.5 SEC — SIGNIFICANT CHANGE UP (ref 9.5–13)
RBC # BLD: 4.43 M/UL — SIGNIFICANT CHANGE UP (ref 3.8–5.2)
RBC # FLD: 15.9 % — HIGH (ref 10.3–14.5)
SAO2 % BLDV: 89.2 % — HIGH (ref 67–88)
SODIUM SERPL-SCNC: 141 MMOL/L — SIGNIFICANT CHANGE UP (ref 135–145)
TROPONIN T, HIGH SENSITIVITY RESULT: 9 NG/L — SIGNIFICANT CHANGE UP (ref 0–51)
WBC # BLD: 5.78 K/UL — SIGNIFICANT CHANGE UP (ref 3.8–10.5)
WBC # FLD AUTO: 5.78 K/UL — SIGNIFICANT CHANGE UP (ref 3.8–10.5)

## 2023-08-10 PROCEDURE — 99223 1ST HOSP IP/OBS HIGH 75: CPT

## 2023-08-10 PROCEDURE — 72128 CT CHEST SPINE W/O DYE: CPT | Mod: 26,MA

## 2023-08-10 PROCEDURE — 71260 CT THORAX DX C+: CPT | Mod: 26,MA

## 2023-08-10 PROCEDURE — 71045 X-RAY EXAM CHEST 1 VIEW: CPT | Mod: 26

## 2023-08-10 PROCEDURE — 99285 EMERGENCY DEPT VISIT HI MDM: CPT

## 2023-08-10 PROCEDURE — 74177 CT ABD & PELVIS W/CONTRAST: CPT | Mod: 26,MA

## 2023-08-10 PROCEDURE — 72131 CT LUMBAR SPINE W/O DYE: CPT | Mod: 26,MA

## 2023-08-10 RX ORDER — SENNA PLUS 8.6 MG/1
2 TABLET ORAL AT BEDTIME
Refills: 0 | Status: DISCONTINUED | OUTPATIENT
Start: 2023-08-10 | End: 2023-08-17

## 2023-08-10 RX ORDER — LANOLIN ALCOHOL/MO/W.PET/CERES
3 CREAM (GRAM) TOPICAL AT BEDTIME
Refills: 0 | Status: DISCONTINUED | OUTPATIENT
Start: 2023-08-10 | End: 2023-08-17

## 2023-08-10 RX ORDER — ENOXAPARIN SODIUM 100 MG/ML
40 INJECTION SUBCUTANEOUS EVERY 24 HOURS
Refills: 0 | Status: DISCONTINUED | OUTPATIENT
Start: 2023-08-10 | End: 2023-08-17

## 2023-08-10 RX ORDER — SODIUM CHLORIDE 9 MG/ML
1000 INJECTION INTRAMUSCULAR; INTRAVENOUS; SUBCUTANEOUS ONCE
Refills: 0 | Status: COMPLETED | OUTPATIENT
Start: 2023-08-10 | End: 2023-08-10

## 2023-08-10 RX ORDER — ONDANSETRON 8 MG/1
4 TABLET, FILM COATED ORAL EVERY 8 HOURS
Refills: 0 | Status: DISCONTINUED | OUTPATIENT
Start: 2023-08-10 | End: 2023-08-17

## 2023-08-10 RX ORDER — ACETAMINOPHEN 500 MG
650 TABLET ORAL EVERY 6 HOURS
Refills: 0 | Status: DISCONTINUED | OUTPATIENT
Start: 2023-08-10 | End: 2023-08-17

## 2023-08-10 RX ADMIN — SODIUM CHLORIDE 1000 MILLILITER(S): 9 INJECTION INTRAMUSCULAR; INTRAVENOUS; SUBCUTANEOUS at 16:10

## 2023-08-10 NOTE — ED PROVIDER NOTE - CLINICAL SUMMARY MEDICAL DECISION MAKING FREE TEXT BOX
83-year-old female Cuban speaking brought to the ED for concerns of frequent falls. Patient not able to engage in meaningful conversation. Complains of upper back pain. Hemodyn stable. Will order labs, CT CAP, Thoracic/Lumber spine. Dispo after labs.

## 2023-08-10 NOTE — ED PROVIDER NOTE - OBJECTIVE STATEMENT
83-year-old female Mauritian speaking brought to the ED for concerns of frequent falls. Patient not able to engage in meaningful conversation. Complains of upper back pain. Will attempt to obtain collateral.

## 2023-08-10 NOTE — ED ADULT NURSE NOTE - OBJECTIVE STATEMENT
84 yo F PMHx of Dementia BIBEMS from home. As per EMS pt's family called and is requesting 24 hour care for pt as she lives alone, has dementia A&Ox2 baseline and has had recent increase of falls. pt ambulates with walker/cane at baseline.. pt's son endorses pt's last fall was a few months ago pt was found to have compression fracture of vertebral unsure which and pt developed pressure ulcer in nursing home.  pt is A&Ox2, TADEO, stage 2 sacral ulcer noted with no drainage and minimal surrounding erythema, no peripheral edema.  Pt denies: headache, dizziness, chest pain, palpitations, cough, SOB, abdominal pain, n/v/d, urinary symptoms, fevers, chills, weakness at this time.

## 2023-08-10 NOTE — H&P ADULT - HISTORY OF PRESENT ILLNESS
83F bedbound (x1.5 years) has HHA 8hr x 7d, pmh chronic lower back pain, recurrent UTIs recent admission at  for metabolic encephalopathy 2/2 recurrent UTI & cystitis presents for eval of recurrent falls. Pt bed bound after having w/multiple falls w/subsequent chronic back pain. pt lives alone, dependent for ADLs & iADLS, family c/f safety.       ROS: Denies CP, SOB, palpitation, N/V/D, fever, cough, chills, dizziness, abm pain, recent travel, sick contact, change in bowel and urinary habits     A 10-system ROS was performed and is negative except as noted above and/or in the HPI.   Pt Welsh speaking and poor historian 2/2 dementia, history obtained via chart review and collateral from son    83F bedbound (x1.5 years) has HHA 8hr x 7d, pmh chronic lower back pain, recurrent UTIs recent admission at  for metabolic encephalopathy 2/2 recurrent UTI & cystitis presents for eval of recurrent falls ( has 2 chart,  Mrn for 2nd chart = [id number]). Pt bed bound after having w/multiple falls w/subsequent chronic back pain. pt lives alone, dependent for ADLs & iADLS, family c/f safety.       ROS: Denies CP, SOB, palpitation, N/V/D, fever, cough, chills, dizziness, abm pain, recent travel, sick contact, change in bowel and urinary habits     A 10-system ROS was performed and is negative except as noted above and/or in the HPI.

## 2023-08-10 NOTE — ED PROVIDER NOTE - ATTENDING CONTRIBUTION TO CARE
[id number] Karin  Private Physician None  83y f PMH No dm,htn.hld,cad. Pt comes to ed c/o "I do not know why I was brought here" Pt triage info pt had recent falls unable to care for self. Lives alone. Pt very poor historian. PT complains of thorasic back pain when moved. PE elderly female thin. chest clear anterior & posterior cv no rubs, gallops or murmurs abd soft +bs pelvis stable. abd soft +bs no mass guarding, Neuro awake alert oriented x2, moves all ex  Gopi Ely MD, Facep

## 2023-08-10 NOTE — ED PROVIDER NOTE - PHYSICAL EXAMINATION
PHYSICAL EXAM:    GENERAL: NAD, lying in bed comfortably  HEAD:  Atraumatic, Normocephalic  EYES: EOMI, PERRLA, conjunctiva and sclera clear  ENT: No erythema/pallor/petechiae/lesionsl  NECK: Supple  LUNG: CTA b/l; no r/r/w  HEART: RRR, +S1/S2; No m/r/g  ABDOMEN: soft, NT/ND; BS audible   EXTREMITIES:  2+ Peripheral Pulses, brisk cap refill. No clubbing, cyanosis, or edema  NERVOUS SYSTEM:  AAOx3, speech clear. No sensory/motor deficits   SKIN: No rashes or lesions

## 2023-08-10 NOTE — H&P ADULT - NSHPPHYSICALEXAM_GEN_ALL_CORE
T(C): 36.6 (08-10-23 @ 21:05), Max: 36.9 (08-10-23 @ 14:46)  HR: 69 (08-10-23 @ 21:05) (69 - 74)  BP: 112/67 (08-10-23 @ 21:05) (112/67 - 114/69)  RR: 18 (08-10-23 @ 21:05) (18 - 20)  SpO2: 96% (08-10-23 @ 21:05) (96% - 97%)    CONSTITUTIONAL: Well groomed, no apparent distress, thin frail appearing   EYES: PERRLA and symmetric, EOMI  ENMT: MMM. Normal dentition  RESP: No respiratory distress, CTA b/l  CV: +S1S2, RRR,  no peripheral edema  GI: Soft, NTND, no RGR  MSK: normal pain free ROM x4 extremities   SKIN: per family and chart review from admission at  pt w/ decubitus on lower back, present prior to admission    NEURO: CN II-XII grossly intact; sensation intact throughout   PSYCH: A+O x 1-2 ( baseline), mood and affect appropriate T(C): 36.6 (08-10-23 @ 21:05), Max: 36.9 (08-10-23 @ 14:46)  HR: 69 (08-10-23 @ 21:05) (69 - 74)  BP: 112/67 (08-10-23 @ 21:05) (112/67 - 114/69)  RR: 18 (08-10-23 @ 21:05) (18 - 20)  SpO2: 96% (08-10-23 @ 21:05) (96% - 97%)    CONSTITUTIONAL: Well groomed, no apparent distress, thin frail appearing   EYES: PERRLA and symmetric, EOMI  ENMT: MMM. Normal dentition  RESP: No respiratory distress, CTA b/l  CV: +S1S2, RRR,  no peripheral edema  GI: Soft, NTND, no RGR  MSK: normal pain free ROM x4 extremities   SKIN: per family and chart review from admission at  pt w/ unstagable sacral wound, present prior to admission    NEURO: CN II-XII grossly intact; sensation intact throughout   PSYCH: A+O x 1-2 ( baseline), mood and affect appropriate

## 2023-08-10 NOTE — H&P ADULT - PROBLEM SELECTOR PLAN 1
Frail elderly pt, bedbound, after having w/multiple falls w/subsequent chronic back pain.  Pt lives alone, dependent for ADLs & iADLS, family c/f safety.   Current has HHA 8hr x7d, family would like to increase hours   - Afebrile, VSS, nontoxic   - EKG NSR  - Labs reviewed and wnl   - CXR clear   - CT L & T spine: age indeterminate compression fx  - CT a/p: large stool burden, endometrial thickening vs fluid  - PT/OT consult for eval   - CM & SW for assistance w/ dispo planning    CT a/P showed  endometrial thickening vs fluid, consider U/S for further eval, could be done O/P

## 2023-08-10 NOTE — H&P ADULT - PROBLEM SELECTOR PLAN 4
- Pt presents w/ unstageable sacra wound, prior to admission   - Per chart review during admission at , was seen by wound care that rec   clean wound with saline daily,  apply medi-honey and cover with a foam dressing every day  - Wound care consult

## 2023-08-10 NOTE — H&P ADULT - NSHPLABSRESULTS_GEN_ALL_CORE
11.5   5.78  )-----------( 281      ( 10 Aug 2023 16:10 )             36.9     08-10    141  |  107  |  13  ----------------------------<  101<H>  4.0   |  22  |  0.41<L>    Ca    9.6      10 Aug 2023 16:10    TPro  6.9  /  Alb  3.8  /  TBili  0.3  /  DBili  x   /  AST  19  /  ALT  14  /  AlkPhos  61  08-10      Troponin T, High Sensitivity Result: 9: * (08.10.23 @ 16:10)      - - - - - - - - - - - - - - - - - - - - - - - - - - - - - - - - - - - - - - - - - - - - - - - - - - - -       EKG personally reviewed:  NSr 70bpm     Images personally reviewed:     < from: Xray Chest 1 View AP/PA (08.10.23 @ 16:53) >  INTERPRETATION:  Clear lungs. No acute osseous abnormalities.    < from: CT Thoracic Spine Reform No Cont (08.10.23 @ 18:44) >    IMPRESSION:  1.  Thoracic spine:   T12 superior endplate compression fracture, age   indeterminate. No canal compromise or vertebral malalignment results    2.  Lumbar spine:   L1 superior endplate compressionfracture, age   indeterminate. No canal compromise or vertebral malalignment results.   Slight retrolisthesis of L3 on L4. No significant degenerative central   canal stenosis. Degenerative foraminal stenosis      < from: CT Chest w/ IV Cont (08.10.23 @ 18:37) >  IMPRESSION:  No acute chest pathology.    Large stool burden with stercoral colitis.    6 mm endometrial thickening versus fluid. Ultrasound correlation can BE   obtained.

## 2023-08-10 NOTE — H&P ADULT - ASSESSMENT
83F bedbound (x1.5 years) has HHA 8hr x7d, pmh chronic lower back pain, recurrent UTIs recent admission at  for metabolic encephalopathy 2/2 recurrent UTI & cystitis presents for eval of recurrent falls c/f safety admission for PT/OT, dispo planning

## 2023-08-10 NOTE — ED PROVIDER NOTE - PROGRESS NOTE DETAILS
Kimani ALVARENGA: Social work informed together information about family contact in order to obtain collateral. Kimani ALVARENGA: Collateral obtained from Grandson, says nurse came to evaluate patients living situation and said to admit to hospital. Patient had a fall 1 mnth ago and had a vertebral fracture which required PT. Regular doctor is Dr. Nathaniel Pantoja

## 2023-08-10 NOTE — ED ADULT NURSE NOTE - NSFALLHARMRISKINTERV_ED_ALL_ED
Assistance OOB with selected safe patient handling equipment if applicable/Communicate risk of Fall with Harm to all staff, patient, and family/Monitor gait and stability/Provide patient with walking aids/Provide visual cue: red socks, yellow wristband, yellow gown, etc/Reinforce activity limits and safety measures with patient and family/Bed in lowest position, wheels locked, appropriate side rails in place/Call bell, personal items and telephone in reach/Instruct patient to call for assistance before getting out of bed/chair/stretcher/Non-slip footwear applied when patient is off stretcher/[place] to call system/Physically safe environment - no spills, clutter or unnecessary equipment/Purposeful Proactive Rounding/Room/bathroom lighting operational, light cord in reach

## 2023-08-11 DIAGNOSIS — Z29.9 ENCOUNTER FOR PROPHYLACTIC MEASURES, UNSPECIFIED: ICD-10-CM

## 2023-08-11 DIAGNOSIS — S22.000A WEDGE COMPRESSION FRACTURE OF UNSPECIFIED THORACIC VERTEBRA, INITIAL ENCOUNTER FOR CLOSED FRACTURE: ICD-10-CM

## 2023-08-11 DIAGNOSIS — R10.819 ABDOMINAL TENDERNESS, UNSPECIFIED SITE: ICD-10-CM

## 2023-08-11 LAB
ANION GAP SERPL CALC-SCNC: 13 MMOL/L — SIGNIFICANT CHANGE UP (ref 5–17)
BUN SERPL-MCNC: 10 MG/DL — SIGNIFICANT CHANGE UP (ref 7–23)
CALCIUM SERPL-MCNC: 9.7 MG/DL — SIGNIFICANT CHANGE UP (ref 8.4–10.5)
CHLORIDE SERPL-SCNC: 105 MMOL/L — SIGNIFICANT CHANGE UP (ref 96–108)
CO2 SERPL-SCNC: 22 MMOL/L — SIGNIFICANT CHANGE UP (ref 22–31)
CREAT SERPL-MCNC: 0.44 MG/DL — LOW (ref 0.5–1.3)
EGFR: 95 ML/MIN/1.73M2 — SIGNIFICANT CHANGE UP
GLUCOSE SERPL-MCNC: 82 MG/DL — SIGNIFICANT CHANGE UP (ref 70–99)
HCT VFR BLD CALC: 34.7 % — SIGNIFICANT CHANGE UP (ref 34.5–45)
HGB BLD-MCNC: 10.7 G/DL — LOW (ref 11.5–15.5)
INR BLD: 1.05 RATIO — SIGNIFICANT CHANGE UP (ref 0.85–1.18)
MCHC RBC-ENTMCNC: 25.7 PG — LOW (ref 27–34)
MCHC RBC-ENTMCNC: 30.8 GM/DL — LOW (ref 32–36)
MCV RBC AUTO: 83.2 FL — SIGNIFICANT CHANGE UP (ref 80–100)
NRBC # BLD: 0 /100 WBCS — SIGNIFICANT CHANGE UP (ref 0–0)
PLATELET # BLD AUTO: 291 K/UL — SIGNIFICANT CHANGE UP (ref 150–400)
POTASSIUM SERPL-MCNC: 3.9 MMOL/L — SIGNIFICANT CHANGE UP (ref 3.5–5.3)
POTASSIUM SERPL-SCNC: 3.9 MMOL/L — SIGNIFICANT CHANGE UP (ref 3.5–5.3)
PROTHROM AB SERPL-ACNC: 11.5 SEC — SIGNIFICANT CHANGE UP (ref 9.5–13)
RBC # BLD: 4.17 M/UL — SIGNIFICANT CHANGE UP (ref 3.8–5.2)
RBC # FLD: 15.9 % — HIGH (ref 10.3–14.5)
SODIUM SERPL-SCNC: 140 MMOL/L — SIGNIFICANT CHANGE UP (ref 135–145)
WBC # BLD: 5.36 K/UL — SIGNIFICANT CHANGE UP (ref 3.8–10.5)
WBC # FLD AUTO: 5.36 K/UL — SIGNIFICANT CHANGE UP (ref 3.8–10.5)

## 2023-08-11 PROCEDURE — 99233 SBSQ HOSP IP/OBS HIGH 50: CPT

## 2023-08-11 PROCEDURE — 99222 1ST HOSP IP/OBS MODERATE 55: CPT

## 2023-08-11 RX ADMIN — SENNA PLUS 2 TABLET(S): 8.6 TABLET ORAL at 23:04

## 2023-08-11 RX ADMIN — ENOXAPARIN SODIUM 40 MILLIGRAM(S): 100 INJECTION SUBCUTANEOUS at 00:02

## 2023-08-11 RX ADMIN — ENOXAPARIN SODIUM 40 MILLIGRAM(S): 100 INJECTION SUBCUTANEOUS at 23:14

## 2023-08-11 RX ADMIN — SENNA PLUS 2 TABLET(S): 8.6 TABLET ORAL at 00:02

## 2023-08-11 NOTE — PHYSICAL THERAPY INITIAL EVALUATION ADULT - ADDITIONAL COMMENTS
as per pt, pt lives in a private house with daughter and nephew, pt reports ambulating with rolling walker, +HHA 7d/9-345pm

## 2023-08-11 NOTE — PROGRESS NOTE ADULT - PROBLEM SELECTOR PLAN 3
With deep palpation LLQ and periumbilical  CT a/p: large stool burden, endometrial thickening vs fluid  Order abdominal US

## 2023-08-11 NOTE — OCCUPATIONAL THERAPY INITIAL EVALUATION ADULT - PERTINENT HX OF CURRENT PROBLEM, REHAB EVAL
83F bedbound (x1.5 years) has HHA 8hr x 7d, pmh chronic lower back pain, recurrent UTIs recent admission at  for metabolic encephalopathy 2/2 recurrent UTI & cystitis presents for eval of recurrent falls (has 2 chart, Mrn for 2nd chart = [id number]). Pt bed bound after having w/multiple falls w/subsequent chronic back pain. pt lives alone, dependent for ADLs & iADLS, family c/f safety. CT Abdomen and pelvis 8/10-Age-indeterminate compression deformities of L1 and L3 vertebral bodies. CT Lumbar spine 8/10-Thoracic spine: T12 superior endplate compression fracture, age indeterminate. No canal compromise or vertebral malalignment results. 2.  Lumbar spine: L1 superior endplate compression fracture, age indeterminate. No canal compromise or vertebral malalignment results. Slight retrolisthesis of L3 on L4. No significant degenerative central canal stenosis. Degenerative foraminal stenosis

## 2023-08-11 NOTE — OCCUPATIONAL THERAPY INITIAL EVALUATION ADULT - MD ORDER
OT evaluate and treat  Activity- Ambulate with assistance OT evaluate and treat  Activity- Ambulate with assistance  Functional Evaluation 8/14

## 2023-08-11 NOTE — OCCUPATIONAL THERAPY INITIAL EVALUATION ADULT - LIVES WITH, PROFILE
as per pt, pt lives in a private house with daughter and nephew./children as per pt, pt lives in a private house with daughter and nephew. pt reported ambulating in house and around neighborhood with occasional use of rolling walker. pt reported being able to use toilet and get dressed with slight assistance. pt has HHA 7days a week from 9-3:30 pm/children

## 2023-08-11 NOTE — OCCUPATIONAL THERAPY INITIAL EVALUATION ADULT - DIAGNOSIS, OT EVAL
pt presents with decreased strength, endurance, cognition, and balance limiting their ability to engage in ADLs and functional tasks.

## 2023-08-11 NOTE — CONSULT NOTE ADULT - SUBJECTIVE AND OBJECTIVE BOX
Wound SURGERY CONSULT NOTE    HPI:  Pt Serbian speaking and poor historian 2/2 dementia, history obtained via chart review and collateral from son    83F bedbound (x1.5 years) has HHA 8hr x 7d, pmh chronic lower back pain, recurrent UTIs recent admission at  for metabolic encephalopathy 2/2 recurrent UTI & cystitis presents for eval of recurrent falls ( has 2 chart,  Mrn for 2nd chart = [id number]). Pt bed bound after having w/multiple falls w/subsequent chronic back pain. pt lives alone, dependent for ADLs & iADLS, family c/f safety.       ROS: Denies CP, SOB, palpitation, N/V/D, fever, cough, chills, dizziness, abm pain, recent travel, sick contact, change in bowel and urinary habits     A 10-system ROS was performed and is negative except as noted above and/or in the HPI.   (10 Aug 2023 21:35)        N/V/D,  BM/ Flatus,   NGT,     palp/ sob/dyspnea/ cp,       F/C/S  Wound consult requested by team to assist w/ management of      wound/ pressure injury.   Pt (unable to)  c/o pain, drainage, odor, color change,  or worsening swelling. Offloading and pericare initiated upon admission as pt Increasingly sedentary 2/2 to illness. Pt is Incontinent of urine & stool. (+)alvarado/ ostomy.   No h/o bites, scratches, falls, trauma.  Pt seen by Wound RN  CAVILON Advance/  Alexander,TRIAD/ Aquacell/ medihoney/ Allevyn foam/ dakins/ Adaptic/ DSD recommended used at home/ while awaiting consult.  Appetite good/ decreased.  weight loss.  S&S / RD consult appreciated All questions asked and answered to pt's and family's expressed understanding and satisfaction.    Current Diet: Diet, Regular:   Supplement Feeding Modality:  Oral  Ensure Clear Cans or Servings Per Day:  1       Frequency:  Two Times a day (08-10-23 @ 22:01)      PAST MEDICAL & SURGICAL HISTORY:  History of chronic back pain      Chronic dementia          REVIEW OF SYSTEMS: Pt unable to offer  General/ Breast/ Skin/Vasc/ Neuro/ MSK: see HPI  All other systems negative    MEDICATIONS  (STANDING):  enoxaparin Injectable 40 milliGRAM(s) SubCutaneous every 24 hours  senna 2 Tablet(s) Oral at bedtime    MEDICATIONS  (PRN):  acetaminophen     Tablet .. 650 milliGRAM(s) Oral every 6 hours PRN Temp greater or equal to 38C (100.4F), Mild Pain (1 - 3)  aluminum hydroxide/magnesium hydroxide/simethicone Suspension 30 milliLiter(s) Oral every 4 hours PRN Dyspepsia  melatonin 3 milliGRAM(s) Oral at bedtime PRN Insomnia  ondansetron Injectable 4 milliGRAM(s) IV Push every 8 hours PRN Nausea and/or Vomiting      Allergies    No Known Allergies    Intolerances        SOCIAL HISTORY:  / /single/ ; (+)HHA/ lives in SNF; Former smoker, No current/ Denies smoking, ETOH, drugs    FAMILY HISTORY:   no h/o PVD or wound healing or skin/ significant problems    PHYSICAL EXAM:  Vital Signs Last 24 Hrs  T(C): 36.6 (11 Aug 2023 10:39), Max: 36.9 (10 Aug 2023 14:46)  T(F): 97.9 (11 Aug 2023 10:39), Max: 98.4 (10 Aug 2023 14:46)  HR: 75 (11 Aug 2023 10:39) (61 - 75)  BP: 96/61 (11 Aug 2023 10:39) (96/61 - 114/69)  BP(mean): 77 (11 Aug 2023 00:04) (77 - 77)  RR: 18 (11 Aug 2023 10:39) (18 - 20)  SpO2: 98% (11 Aug 2023 10:39) (96% - 98%)    Parameters below as of 11 Aug 2023 10:39  Patient On (Oxygen Delivery Method): room air        NAD, Guarded but stable,  A&Ox3/ Alert/ Confused  cachectic/ thin, MO/ Obese, frail,  WD/ WN/ WG,  Disheveled  Total Care Sport/ Versa Care P500 / Envella Progressa bed     HEENT:  NC/AT, PERRL, EOMI, sclera clear, mucosa moist, throat clear, trachea midline, neck supple, trach  Respiratory: nonlabored w/ equal chest rise  Gastrointestinal: soft NT/ND (+)BS  (+)PEG (+)ostomy (+)NGT  : (+)alvarado/ purewick/ condom cath  Neurology:  weakened strength & sensation grossly intact, paraesthesia  nonverbal, no follow commands, paraplegic  Psych: calm/ appropriate/ flat affect/ easily agitated/ restless/ anxious/ difficult to assess  Musculoskeletal:  limited stiff / p/FROM, no deformities/ contractures  Vascular: BLE equally warm/ cool,  no cyanosis, clubbing, edema nor acute ischemia           >LE //BLE edema equal           BLE DP/PT pulses palpable          BLE hemosiderin staining/ varicose veins  Skin:  moist w/ good turgor  thin, dry, pale, frail,  ecchymosis w/o hematoma  blistering  or serosanguinous drainage  No odor, erythema, increased warmth, tenderness, induration, fluctuance, nor crepitus    LABS/ CULTURES/ RADIOLOGY:                        10.7   5.36  )-----------( 291      ( 11 Aug 2023 07:04 )             34.7       140  |  105  |  10  ----------------------------<  82      [08-11-23 @ 07:04]  3.9   |  22  |  0.44        Ca     9.7     [08-11-23 @ 07:04]    TPro  6.9  /  Alb  3.8  /  TBili  0.3  /  DBili  x   /  AST  19  /  ALT  14  /  AlkPhos  61  [08-10-23 @ 16:10]    PT/INR: PT 11.5 , INR 1.05       [08-11-23 @ 07:04]  PTT: 27.4       [08-10-23 @ 16:10]                              A/P:    Wound Consult requested to assist w/ management of    BLE elevation & Compression  Consider YOCASTA/PVR, Duplex, Xray, A/P BLE CT or MRI  Abx per Medicine/ ID  Moisturize intact skin w/ SWEEN cream BID  Nutrition Consult for optimization in pt w/ Severe Protein Calorie Malnutrition,        Inadequate PO intake, & Increased nutritional needs            encourage high quality protein, bakari/ prosource, MVI & Vit C to promote wound healing  Hyperglycemia - improving w/ ADA diet and Lantus/ NPH & FS w/ ISS, consider Endo Consult, consider HgA1c  Anemia- improving w/ transfusions, Fe studies, protonix  Continue turning and positioning w/ offloading assistive devices as per protocol  Buttocks/ Sacrum Alexander/ TRIAD BID /CAVILON ADVANCE TIW and prn soiling        Continue w/ attends under pads and Pericare w/ alvarado/ condom cath maintenance / purewick care as per protocol  Waffle Cushion to chair when oob to chair  Continue w/ low air loss pressure redistribution bed surface   Pt will need Group 2 mattress on hospital bed and ROHO cushion for wheel chair upon discharge home  Care as per medicine, will follow w/ you/ remain available as requested  Upon discharge f/u as outpatient at [facility] [address] [facility] [phone]  Seen w/ attng & RN and D/w team & RN  Thank you for this consult  Danae Payan PA-C [id number]  Nights/ Weekends/ Holidays please call:  General Surgery Consult pager ([phone]) for emergencies  Wound PT for multilayer leg wrapping or VAC issues (x [phone])      Wound SURGERY CONSULT NOTE    HPI:  Pt Pashto speaking and poor historian 2/2 dementia, history obtained via chart review and collateral from son    83F bedbound (x1.5 years) has HHA 8hr x 7d, pmh chronic lower back pain, recurrent UTIs recent admission at  for metabolic encephalopathy 2/2 recurrent UTI & cystitis presents for eval of recurrent falls ( has 2 chart,  Mrn for 2nd chart = [id number]). Pt bed bound after having w/multiple falls w/subsequent chronic back pain. pt lives alone, dependent for ADLs & iADLS, family c/f safety.       ROS: Denies CP, SOB, palpitation, N/V/D, fever, cough, chills, dizziness, abm pain, recent travel, sick contact, change in bowel and urinary habits     A 10-system ROS was performed and is negative except as noted above and/or in the HPI.   (10 Aug 2023 21:35)        N/V/D,  BM/ Flatus,   NGT,     palp/ sob/dyspnea/ cp,       F/C/S  Wound consult requested by team to assist w/ management of      wound/ pressure injury.   Pt (unable to)  c/o pain, drainage, odor, color change,  or worsening swelling. Offloading and pericare initiated upon admission as pt Increasingly sedentary 2/2 to illness. Pt is Incontinent of urine & stool. (+)alvarado/ ostomy.   No h/o bites, scratches, falls, trauma.  Pt seen by Wound RN  CAVILON Advance/  Alexander,TRIAD/ Aquacell/ medihoney/ Allevyn foam/ dakins/ Adaptic/ DSD recommended used at home/ while awaiting consult.  Appetite good/ decreased.  weight loss.  S&S / RD consult appreciated All questions asked and answered to pt's and family's expressed understanding and satisfaction.    Current Diet: Diet, Regular:   Supplement Feeding Modality:  Oral  Ensure Clear Cans or Servings Per Day:  1       Frequency:  Two Times a day (08-10-23 @ 22:01)      PAST MEDICAL & SURGICAL HISTORY:  History of chronic back pain      Chronic dementia          REVIEW OF SYSTEMS: Pt unable to offer  General/ Breast/ Skin/Vasc/ Neuro/ MSK: see HPI  All other systems negative    MEDICATIONS  (STANDING):  enoxaparin Injectable 40 milliGRAM(s) SubCutaneous every 24 hours  senna 2 Tablet(s) Oral at bedtime    MEDICATIONS  (PRN):  acetaminophen     Tablet .. 650 milliGRAM(s) Oral every 6 hours PRN Temp greater or equal to 38C (100.4F), Mild Pain (1 - 3)  aluminum hydroxide/magnesium hydroxide/simethicone Suspension 30 milliLiter(s) Oral every 4 hours PRN Dyspepsia  melatonin 3 milliGRAM(s) Oral at bedtime PRN Insomnia  ondansetron Injectable 4 milliGRAM(s) IV Push every 8 hours PRN Nausea and/or Vomiting      Allergies    No Known Allergies    Intolerances        SOCIAL HISTORY:  / /single/ ; (+)HHA/ lives in SNF; Former smoker, No current/ Denies smoking, ETOH, drugs    FAMILY HISTORY:   no h/o PVD or wound healing or skin/ significant problems    PHYSICAL EXAM:  Vital Signs Last 24 Hrs  T(C): 36.6 (11 Aug 2023 10:39), Max: 36.9 (10 Aug 2023 14:46)  T(F): 97.9 (11 Aug 2023 10:39), Max: 98.4 (10 Aug 2023 14:46)  HR: 75 (11 Aug 2023 10:39) (61 - 75)  BP: 96/61 (11 Aug 2023 10:39) (96/61 - 114/69)  BP(mean): 77 (11 Aug 2023 00:04) (77 - 77)  RR: 18 (11 Aug 2023 10:39) (18 - 20)  SpO2: 98% (11 Aug 2023 10:39) (96% - 98%)    Parameters below as of 11 Aug 2023 10:39  Patient On (Oxygen Delivery Method): room air        NAD, Guarded but stable,  A&Ox3/ Alert/ Confused  cachectic/ thin, MO/ Obese, frail,  WD/ WN/ WG,  Disheveled  Total Care Sport/ Versa Care P500 / Envella Progressa bed     HEENT:  NC/AT, PERRL, EOMI, sclera clear, mucosa moist, throat clear, trachea midline, neck supple, trach  Respiratory: nonlabored w/ equal chest rise  Gastrointestinal: soft NT/ND (+)BS  (+)PEG (+)ostomy (+)NGT  : (+)alvarado/ purewick/ condom cath  Neurology:  weakened strength & sensation grossly intact, paraesthesia  nonverbal, no follow commands, paraplegic  Psych: calm/ appropriate/ flat affect/ easily agitated/ restless/ anxious/ difficult to assess  Musculoskeletal:  limited stiff / p/FROM, no deformities/ contractures  Vascular: BLE equally warm/ cool,  no cyanosis, clubbing, edema nor acute ischemia           >LE //BLE edema equal           BLE DP/PT pulses palpable          BLE hemosiderin staining/ varicose veins  Skin:  moist w/ good turgor  thin, dry, pale, frail,  ecchymosis w/o hematoma  blistering  or serosanguinous drainage  No odor, erythema, increased warmth, tenderness, induration, fluctuance, nor crepitus    LABS/ CULTURES/ RADIOLOGY:                        10.7   5.36  )-----------( 291      ( 11 Aug 2023 07:04 )             34.7       140  |  105  |  10  ----------------------------<  82      [08-11-23 @ 07:04]  3.9   |  22  |  0.44        Ca     9.7     [08-11-23 @ 07:04]    TPro  6.9  /  Alb  3.8  /  TBili  0.3  /  DBili  x   /  AST  19  /  ALT  14  /  AlkPhos  61  [08-10-23 @ 16:10]    PT/INR: PT 11.5 , INR 1.05       [08-11-23 @ 07:04]  PTT: 27.4       [08-10-23 @ 16:10]           Wound SURGERY CONSULT NOTE    HPI:  Pt Maori speaking and poor historian 2/2 dementia, history obtained via chart review and collateral from son    83F bedbound (x1.5 years) has HHA 8hr x 7d, pmh chronic lower back pain, recurrent UTIs recent admission at  for metabolic encephalopathy 2/2 recurrent UTI & cystitis presents for eval of recurrent falls ( has 2 chart,  Mrn for 2nd chart = [id number]). Pt bed bound after having w/multiple falls w/subsequent chronic back pain. pt lives alone, dependent for ADLs & ADLS, family c/f safety. Denies CP, SOB, palpitation, N/V/D, fever, cough, chills, dizziness, pain, recent travel, sick contact, change in bowel and urinary habits     Wound consult requested by team to assist w/ management of sacral wound/ pressure injury.  Pt w/o c/o pain, drainage, odor, color change,  or worsening swelling. Offloading and pericare initiated upon admission as pt Increasingly sedentary 2/2 to illness. Pt is Incontinent of urine & stool. No new h/o bites, scratches, falls, trauma.  Appetite so/so w/ weight loss.     Current Diet: Diet, Regular:   Supplement Feeding Modality:  Oral  Ensure Clear Cans or Servings Per Day:  1       Frequency:  Two Times a day (08-10-23 @ 22:01)      PAST MEDICAL & SURGICAL HISTORY:  Chronic back pain    dementia      REVIEW OF SYSTEMS: Pt unable to offer      MEDICATIONS  (STANDING):  enoxaparin Injectable 40 milliGRAM(s) SubCutaneous every 24 hours  senna 2 Tablet(s) Oral at bedtime    MEDICATIONS  (PRN):  acetaminophen     Tablet .. 650 milliGRAM(s) Oral every 6 hours PRN Temp greater or equal to 38C (100.4F), Mild Pain (1 - 3)  aluminum hydroxide/magnesium hydroxide/simethicone Suspension 30 milliLiter(s) Oral every 4 hours PRN Dyspepsia  melatonin 3 milliGRAM(s) Oral at bedtime PRN Insomnia  ondansetron Injectable 4 milliGRAM(s) IV Push every 8 hours PRN Nausea and/or Vomiting      No Known Allergies    SOCIAL HISTORY:  single/  lives alone, No smoking, ETOH, drugs    FAMILY HISTORY:   no h/o PVD or wound healing or skin/ significant problems    PHYSICAL EXAM:  Vital Signs Last 24 Hrs  T(C): 36.6 (11 Aug 2023 10:39), Max: 36.9 (10 Aug 2023 14:46)  T(F): 97.9 (11 Aug 2023 10:39), Max: 98.4 (10 Aug 2023 14:46)  HR: 75 (11 Aug 2023 10:39) (61 - 75)  BP: 96/61 (11 Aug 2023 10:39) (96/61 - 114/69)  BP(mean): 77 (11 Aug 2023 00:04) (77 - 77)  RR: 18 (11 Aug 2023 10:39) (18 - 20)  SpO2: 98% (11 Aug 2023 10:39) (96% - 98%)    Parameters below as of 11 Aug 2023 10:39  Patient On (Oxygen Delivery Method): room air    NAD, Alert/ Confused,  thin, frail,  WD/ WN/Disheveled  HEENT:  NC/AT, EOMI, sclera clear, mucosa moist, throat clear, trachea midline, neck supple  Respiratory: nonlabored w/ equal chest rise  Gastrointestinal: soft NT/ND  : (+)purewick cath  Neurology:  weakened strength & sensation grossly intact  Psych: calm/ appropriate  Musculoskeletal: FROM, no deformities/ contractures  Vascular: BLE equally warm,  no cyanosis, clubbing, edema nor acute ischemia  Skin:  thin, dry, pale, frail,  ecchymosis w/o hematoma  Bilateral buttocks w/ hyperpigmentation of chronic incontinence  sacrum w/ moist denuded skin surrounded by hyper/ hypopigmented skin      of healing pressure injury     no blistering or drainage  No odor, erythema, increased warmth, tenderness, induration, fluctuance, nor crepitus    LABS/ CULTURES/ RADIOLOGY:                        10.7   5.36  )-----------( 291      ( 11 Aug 2023 07:04 )             34.7       140  |  105  |  10  ----------------------------<  82      [08-11-23 @ 07:04]  3.9   |  22  |  0.44        Ca     9.7     [08-11-23 @ 07:04]    TPro  6.9  /  Alb  3.8  /  TBili  0.3  /  DBili  x   /  AST  19  /  ALT  14  /  AlkPhos  61  [08-10-23 @ 16:10]    PT/INR: PT 11.5 , INR 1.05       [08-11-23 @ 07:04]  PTT: 27.4       [08-10-23 @ 16:10]

## 2023-08-11 NOTE — OCCUPATIONAL THERAPY INITIAL EVALUATION ADULT - TRANSFER TRAINING, PT EVAL
GOAL: Pt will require min A with all functional transfers in 4 weeks to increase participation in functional mobility tasks.

## 2023-08-11 NOTE — PROGRESS NOTE ADULT - PROBLEM SELECTOR PLAN 6
unstageable sacra wound, prior to admission   clean wound with saline daily,  apply medi-honey and cover with a foam dressing every day  Wound care consult

## 2023-08-11 NOTE — PROGRESS NOTE ADULT - PROBLEM SELECTOR PLAN 2
CT L & T spine: age indeterminate compression fx  Ortho recs WBAT with PT/OT  PT/OT consult for eval   d/c to STR

## 2023-08-11 NOTE — PHYSICAL THERAPY INITIAL EVALUATION ADULT - PATIENT PROFILE REVIEW, REHAB EVAL
Psychotherapy Provided: Individual Psychotherapy 50 minutes     Length of time in session: 50 minutes, follow up in 2 week    Encounter Diagnosis     ICD-10-CM    1  PTSD (post-traumatic stress disorder)  F43 10       2  Acute post-traumatic stress disorder  F43 11       3  Major depressive disorder, recurrent severe without psychotic features ([facility] [address] )  F33 2       4  Memory difficulty  R41 3       5  Expected bereavement due to life event  Z63 4           Goals addressed in session: Goal 1, Goal 2 and Goal 3      Pain:      moderate to severe    9    Current suicide risk : Low     Data: Galileo's physical pain is at a 9  His severe headaches have returned  He is so upset what disability is putting him thru  He has so many medical issues  Brooke Pretty feels he has no support from his job or the union that represents him  Brooke Pretty has severe physical and emotional symptoms as a result of catching covid on his job  He has depression, anxiety, PTSD and sleep issues all as a result  He also has financial issues all related to how sick he was and the symptoms that remain  Assessment: Brooke Pretty denies suicidal/homicidal ideation, plan or intent  However he is very depressed, anxious and traumatized from his work related illness  I continue to provide mindfulness, CBT and solution focused therapy strategies  Plan: Continue to provide the support Brooke Pretty needs thru this difficult time  Behavioral Health Treatment Plan [facility]: Diagnosis and Treatment Plan explained to Richmond Stewart relates understanding diagnosis and is agreeable to Treatment Plan   Yes     Visit start and stop times:    11/18/22  Start Time: 1010  Stop Time: 1100  Total Visit Time: 50 minutes
yes

## 2023-08-11 NOTE — PROGRESS NOTE ADULT - SUBJECTIVE AND OBJECTIVE BOX
SUBJECTIVE / OVERNIGHT EVENTS:  Today is hospital day . There are no new issues or overnight events.   Denies any headache, lightheadedness, vertigo, shortness of breathe, cough, chest pain, palpitations, tachycardia, abdominal pain, nausea, vomiting, diarrhea or constipation    HPI:  Pt Greek speaking and poor historian 2/2 dementia, history obtained via chart review and collateral from son    83F bedbound (x1.5 years) has HHA 8hr x 7d, pmh chronic lower back pain, recurrent UTIs recent admission at  for metabolic encephalopathy 2/2 recurrent UTI & cystitis presents for eval of recurrent falls ( has 2 chart,  Mrn for 2nd chart = [id number]). Pt bed bound after having w/multiple falls w/subsequent chronic back pain. pt lives alone, dependent for ADLs & iADLS, family c/f safety.       ROS: Denies CP, SOB, palpitation, N/V/D, fever, cough, chills, dizziness, abm pain, recent travel, sick contact, change in bowel and urinary habits     A 10-system ROS was performed and is negative except as noted above and/or in the HPI.   (10 Aug 2023 21:35)    MEDICATIONS  (STANDING):  enoxaparin Injectable 40 milliGRAM(s) SubCutaneous every 24 hours  senna 2 Tablet(s) Oral at bedtime    MEDICATIONS  (PRN):  acetaminophen     Tablet .. 650 milliGRAM(s) Oral every 6 hours PRN Temp greater or equal to 38C (100.4F), Mild Pain (1 - 3)  aluminum hydroxide/magnesium hydroxide/simethicone Suspension 30 milliLiter(s) Oral every 4 hours PRN Dyspepsia  melatonin 3 milliGRAM(s) Oral at bedtime PRN Insomnia  ondansetron Injectable 4 milliGRAM(s) IV Push every 8 hours PRN Nausea and/or Vomiting    HOME MEDICATIONS:    PHYSICAL EXAM:  Vital Signs Last 24 Hrs  T(C): 36.6 (11 Aug 2023 10:39), Max: 36.8 (11 Aug 2023 00:04)  T(F): 97.9 (11 Aug 2023 10:39), Max: 98.2 (11 Aug 2023 00:04)  HR: 78 (11 Aug 2023 11:35) (61 - 78)  BP: 108/57 (11 Aug 2023 11:35) (96/61 - 112/67)  BP(mean): 77 (11 Aug 2023 00:04) (77 - 77)  RR: 18 (11 Aug 2023 10:39) (18 - 18)  SpO2: 98% (11 Aug 2023 11:35) (96% - 98%)    Parameters below as of 11 Aug 2023 11:35  Patient On (Oxygen Delivery Method): room air      I&O's Summary    CONSTITUTIONAL: NAD, well-developed, well-groomed  EYES: PERRLA; conjunctiva and sclera clear  ENMT: Moist oral mucosa;  normal dentition  NECK: Trachea at midline  RESPIRATORY: Normal respiratory effort; lungs are clear to auscultation bilaterally  CARDIOVASCULAR: Regular rate and rhythm, normal S1 and S2, no murmur/rub/gallop; No lower extremity edema  ABDOMEN: Soft to palpation, no rebound/guarding;  MUSCULOSKELETAL:  Normal gait; no clubbing or cyanosis of digits  PSYCH: affect appropriate  NEUROLOGY: A+O to person, place, and time; no gross motor deficits     LABS:                        10.7   5.36  )-----------( 291      ( 11 Aug 2023 07:04 )             34.7     08-11    140  |  105  |  10  ----------------------------<  82  3.9   |  22  |  0.44<L>    Ca    9.7      11 Aug 2023 07:04    TPro  6.9  /  Alb  3.8  /  TBili  0.3  /  DBili  x   /  AST  19  /  ALT  14  /  AlkPhos  61  08-10    PT/INR - ( 11 Aug 2023 07:04 )   PT: 11.5 sec;   INR: 1.05 ratio         PTT - ( 10 Aug 2023 16:10 )  PTT:27.4 sec      Urinalysis Basic - ( 11 Aug 2023 07:04 )    Color: x / Appearance: x / SG: x / pH: x  Gluc: 82 mg/dL / Ketone: x  / Bili: x / Urobili: x   Blood: x / Protein: x / Nitrite: x   Leuk Esterase: x / RBC: x / WBC x   Sq Epi: x / Non Sq Epi: x / Bacteria: x            RADIOLOGY & ADDITIONAL TESTS:  EKG

## 2023-08-11 NOTE — PHYSICAL THERAPY INITIAL EVALUATION ADULT - PERTINENT HX OF CURRENT PROBLEM, REHAB EVAL
Pt is a 83F admitted to [facility] on 8/10/23 hx bedbound (x1.5 years) has HHA 8hr x 7d, pmh chronic lower back pain, recurrent UTIs recent admission at  for metabolic encephalopathy 2/2 recurrent UTI & cystitis presents for eval of recurrent falls ( has 2 chart,  Mrn for 2nd chart = [id number]). Pt bed bound after having w/multiple falls w/subsequent chronic back pain. pt lives alone, dependent for ADLs & iADLS, family c/f safety. Hospital course:  Denies CP, SOB, palpitation, N/V/D, fever, cough, chills, dizziness, abm pain, recent travel, sick contact, change in bowel and urinary habits. CXR clear,  CT L & T spine: age indeterminate compression fx (Thoracic spine: T12 superior endplate compression fracture, age indeterminate. No canal compromise or vertebral malalignment results; Lumbar spine:L1 superior endplate compression fracture, age indeterminate. No canal compromise or vertebral malalignment results. Slight retrolisthesis of L3 on L4. No significant degenerative central canal stenosis. Degenerative foraminal stenosis), CT a/p: large stool burden, endometrial thickening vs fluid,  Pt presents w/ unstageable sacra wound, prior to admission

## 2023-08-11 NOTE — CONSULT NOTE ADULT - ASSESSMENT
A/P: 83F bedbound (x1.5 years) has HHA 8hr x7d, pmh chronic lower back pain, recurrent UTIs recent admission at  for metabolic encephalopathy 2/2 recurrent UTI & cystitis presents for eval of recurrent falls c/f safety admission for PT/OT, dispo planning     Wound Consult requested to assist w/ management of Sacral Stage 2 pressure injury  Incontinence Dermatitis of buttocks    Buttocks/ Sacrum TRIAD BID  and prn soiling        Continue w/ attends under pads and Pericare w/ purewick care as per protocol  BLE elevation & Compression  Abx per Medicine/ ID  Moisturize intact skin w/ SWEEN cream BID  Nutrition Consult for optimization       encourage high quality protein, bakari/ prosource, MVI & Vit C to promote wound healing  Continue turning and positioning w/ offloading assistive devices as per protocol  Waffle Cushion to chair when oob to chair  Continue w/ low air loss pressure redistribution bed surface   Care as per medicine, remain available as requested  Upon discharge f/u as outpatient at [facility] [address] [facility] [phone]  D/w team & RN  & attng  Thank you for this consult  Danae Payan PA-C CWS [id number]  Nights/ Weekends/ Holidays please call:  General Surgery Consult pager ([phone]) for emergencies  Wound PT for multilayer leg wrapping or VAC issues (x [phone])   I spent 55minutes face to face w/ this pt of which more than 50% of the time was spent counseling & coordinating care of this pt.

## 2023-08-11 NOTE — OCCUPATIONAL THERAPY INITIAL EVALUATION ADULT - LEVEL OF INDEPENDENCE: SIT/SUPINE, REHAB EVAL
Upon sitting at EOB, pt reporting severe pain in back, +retropulsion. Further mobility deferred at this time.

## 2023-08-12 PROCEDURE — 99232 SBSQ HOSP IP/OBS MODERATE 35: CPT

## 2023-08-12 RX ORDER — POLYETHYLENE GLYCOL 3350 17 G/17G
17 POWDER, FOR SOLUTION ORAL DAILY
Refills: 0 | Status: DISCONTINUED | OUTPATIENT
Start: 2023-08-12 | End: 2023-08-17

## 2023-08-12 RX ADMIN — SENNA PLUS 2 TABLET(S): 8.6 TABLET ORAL at 21:10

## 2023-08-12 NOTE — DIETITIAN INITIAL EVALUATION ADULT - PERTINENT MEDS FT
MEDICATIONS  (STANDING):  enoxaparin Injectable 40 milliGRAM(s) SubCutaneous every 24 hours  polyethylene glycol 3350 17 Gram(s) Oral daily  senna 2 Tablet(s) Oral at bedtime    MEDICATIONS  (PRN):  acetaminophen     Tablet .. 650 milliGRAM(s) Oral every 6 hours PRN Temp greater or equal to 38C (100.4F), Mild Pain (1 - 3)  aluminum hydroxide/magnesium hydroxide/simethicone Suspension 30 milliLiter(s) Oral every 4 hours PRN Dyspepsia  melatonin 3 milliGRAM(s) Oral at bedtime PRN Insomnia  ondansetron Injectable 4 milliGRAM(s) IV Push every 8 hours PRN Nausea and/or Vomiting

## 2023-08-12 NOTE — PROGRESS NOTE ADULT - NSPROGADDITIONALINFOA_GEN_ALL_CORE
Time-based billing (NON-critical care).     51 minutes spent on total encounter. The necessity of the time spent during the encounter on this date of service was due to:     Time-based billing (NON-critical care).     The necessity of the time spent during the encounter on this date of service was due to:     - Ordering, reviewing, and interpreting labs, testing, and imaging.  - Independently obtaining a review of systems and performing a physical exam  - Reviewing prior records and where necessary, outpatient records.  - Counselling and educating patient and family regarding interpretation of aforementioned items and plan of care.    d/w ACP Time-based billing (NON-critical care).     48 minutes spent on total encounter. The necessity of the time spent during the encounter on this date of service was due to:     Time-based billing (NON-critical care).     The necessity of the time spent during the encounter on this date of service was due to:     - Ordering, reviewing, and interpreting labs, testing, and imaging.  - Independently obtaining a review of systems and performing a physical exam  - Reviewing prior records and where necessary, outpatient records.  - Counselling and educating patient and family regarding interpretation of aforementioned items and plan of care.    d/w ACP

## 2023-08-12 NOTE — DIETITIAN INITIAL EVALUATION ADULT - MALNUTRITION
[FreeTextEntry1] : EKG today was within normal limits\par NSR at 68 BPM
chronic severe protein calorie malnutrition

## 2023-08-12 NOTE — DIETITIAN INITIAL EVALUATION ADULT - PERTINENT LABORATORY DATA
08-11    140  |  105  |  10  ----------------------------<  82  3.9   |  22  |  0.44<L>    Ca    9.7      11 Aug 2023 07:04    TPro  6.9  /  Alb  3.8  /  TBili  0.3  /  DBili  x   /  AST  19  /  ALT  14  /  AlkPhos  61  08-10

## 2023-08-12 NOTE — DIETITIAN INITIAL EVALUATION ADULT - NSFNSGIASSESSMENTFT_GEN_A_CORE
- RN denies pt with vomiting, diarrhea, or constipation at this time   - Last BM:8/11 per RN; currently ordered for bowel regimen (senna)

## 2023-08-12 NOTE — DIETITIAN INITIAL EVALUATION ADULT - ADD RECOMMEND
- Malnutrition sticker placed, RD to follow-up as per protocol   - If not medically contraindicated, recommend MVI, vitamin C 500 mg daily to promote wound healing.   - Will continue to monitor PO intake, weight, labs, skin, GI status, diet.   - Nutrition care plan to remain consistent with pt goals of care  - RD remains available to review diet education and adjust diet recommendations as needed.

## 2023-08-12 NOTE — DIETITIAN INITIAL EVALUATION ADULT - FEEDING ASSISTANCE
Provide assistance with meals as needed to promote adequate PO intake  Encourage adequate consumption of meals/supplements to optimize protein-energy intake

## 2023-08-12 NOTE — DIETITIAN INITIAL EVALUATION ADULT - NS FNS DIET ORDER
Diet, Regular:   Supplement Feeding Modality:  Oral  Ensure Clear Cans or Servings Per Day:  1       Frequency:  Two Times a day (08-10-23)

## 2023-08-12 NOTE — PROGRESS NOTE ADULT - PROBLEM SELECTOR PLAN 6
Unstageable sacra wound, prior to admission   clean wound with saline daily,  apply medi-honey and cover with a foam dressing every day  Wound care consult- Continue w/ attends under pads and Pericare w/ Purewick care as per protocol Unstageable sacra wound, prior to admission   Clean wound with saline daily,  apply medi-honey and cover with a foam dressing every day  Wound care consult- Continue w/ attends under pads and Pericare w/ Purewick care as per protocol

## 2023-08-12 NOTE — DIETITIAN NUTRITION RISK NOTIFICATION - TREATMENT: THE FOLLOWING DIET HAS BEEN RECOMMENDED
Diet, Regular:   Supplement Feeding Modality:  Oral  Ensure Clear Cans or Servings Per Day:  1       Frequency:  Two Times a day (08-10-23 @ 22:01) [Active]

## 2023-08-12 NOTE — DIETITIAN INITIAL EVALUATION ADULT - REASON INDICATOR FOR ASSESSMENT
Consult received for nutrition assessment   Information obtained from pt ([place] Islander speaking,  services used ID [id number], Jose Juan)- attempted but pt confused, discussed with RN, electronic medical record  Chart reviewed, events noted

## 2023-08-12 NOTE — PROGRESS NOTE ADULT - PROBLEM SELECTOR PLAN 2
CT L & T spine: age indeterminate compression fx  Ortho recs WBAT with PT/OT  PT/OT consult for eval   d/c to STR CT L & T spine: age indeterminate compression fx  Ortho recs WBAT with PT/OT  PT/OT consult for eval

## 2023-08-12 NOTE — PROGRESS NOTE ADULT - SUBJECTIVE AND OBJECTIVE BOX
[facility] Division of Hospital Medicine  Suzanne Aquino DO  Pager (M-F, 8A-5P): MS Teams PREFERRED      SUBJECTIVE / OVERNIGHT EVENTS:  ADDITIONAL REVIEW OF SYSTEMS:    MEDICATIONS  (STANDING):  enoxaparin Injectable 40 milliGRAM(s) SubCutaneous every 24 hours  senna 2 Tablet(s) Oral at bedtime    MEDICATIONS  (PRN):  acetaminophen     Tablet .. 650 milliGRAM(s) Oral every 6 hours PRN Temp greater or equal to 38C (100.4F), Mild Pain (1 - 3)  aluminum hydroxide/magnesium hydroxide/simethicone Suspension 30 milliLiter(s) Oral every 4 hours PRN Dyspepsia  melatonin 3 milliGRAM(s) Oral at bedtime PRN Insomnia  ondansetron Injectable 4 milliGRAM(s) IV Push every 8 hours PRN Nausea and/or Vomiting      I&O's Summary    11 Aug 2023 07:01  -  12 Aug 2023 07:00  --------------------------------------------------------  IN: 240 mL / OUT: 100 mL / NET: 140 mL        PHYSICAL EXAM:  Vital Signs Last 24 Hrs  T(C): 36.9 (12 Aug 2023 06:02), Max: 37 (11 Aug 2023 19:38)  T(F): 98.4 (12 Aug 2023 06:02), Max: 98.6 (11 Aug 2023 19:38)  HR: 79 (12 Aug 2023 06:02) (75 - 79)  BP: 124/79 (12 Aug 2023 06:02) (96/61 - 124/79)  BP(mean): --  RR: 18 (12 Aug 2023 06:02) (18 - 18)  SpO2: 98% (12 Aug 2023 06:02) (98% - 98%)    Parameters below as of 12 Aug 2023 06:02  Patient On (Oxygen Delivery Method): room air      CONSTITUTIONAL: NAD, well-developed, well-groomed  EYES: PERRLA; conjunctiva and sclera clear  ENMT: Moist oral mucosa;  normal dentition  NECK: Trachea at midline  RESPIRATORY: Normal respiratory effort; lungs are clear to auscultation bilaterally  CARDIOVASCULAR: Regular rate and rhythm, normal S1 and S2, no murmur/rub/gallop; No lower extremity edema  ABDOMEN: Soft to palpation, no rebound/guarding;  MUSCULOSKELETAL:  Normal gait; no clubbing or cyanosis of digits  PSYCH: affect appropriate  NEUROLOGY: A+O to person, place, and time; no gross motor deficits   LABS:                        10.7   5.36  )-----------( 291      ( 11 Aug 2023 07:04 )             34.7     08-11    140  |  105  |  10  ----------------------------<  82  3.9   |  22  |  0.44<L>    Ca    9.7      11 Aug 2023 07:04    TPro  6.9  /  Alb  3.8  /  TBili  0.3  /  DBili  x   /  AST  19  /  ALT  14  /  AlkPhos  61  08-10    PT/INR - ( 11 Aug 2023 07:04 )   PT: 11.5 sec;   INR: 1.05 ratio         PTT - ( 10 Aug 2023 16:10 )  PTT:27.4 sec      Urinalysis Basic - ( 11 Aug 2023 07:04 )    Color: x / Appearance: x / SG: x / pH: x  Gluc: 82 mg/dL / Ketone: x  / Bili: x / Urobili: x   Blood: x / Protein: x / Nitrite: x   Leuk Esterase: x / RBC: x / WBC x   Sq Epi: x / Non Sq Epi: x / Bacteria: x          RADIOLOGY & ADDITIONAL TESTS:  Results Reviewed:   Imaging Personally Reviewed:  Electrocardiogram Personally Reviewed:    COORDINATION OF CARE:  Care Discussed with Consultants/Other Providers [Y/N]:  Prior or Outpatient Records Reviewed [Y/N]:   [facility] Division of Hospital Medicine  Suzanne DO Miles  Pager (M-F, 8A-5P): MS Teams PREFERRED      SUBJECTIVE / OVERNIGHT EVENTS: No acute events overnight. Patient with no acute complaints. Denies fever, chills, chest pain, palpitations, SOB, N/V, abdominal pain, diarrhea.   ADDITIONAL REVIEW OF SYSTEMS:    MEDICATIONS  (STANDING):  enoxaparin Injectable 40 milliGRAM(s) SubCutaneous every 24 hours  senna 2 Tablet(s) Oral at bedtime    MEDICATIONS  (PRN):  acetaminophen     Tablet .. 650 milliGRAM(s) Oral every 6 hours PRN Temp greater or equal to 38C (100.4F), Mild Pain (1 - 3)  aluminum hydroxide/magnesium hydroxide/simethicone Suspension 30 milliLiter(s) Oral every 4 hours PRN Dyspepsia  melatonin 3 milliGRAM(s) Oral at bedtime PRN Insomnia  ondansetron Injectable 4 milliGRAM(s) IV Push every 8 hours PRN Nausea and/or Vomiting      I&O's Summary    11 Aug 2023 07:01  -  12 Aug 2023 07:00  --------------------------------------------------------  IN: 240 mL / OUT: 100 mL / NET: 140 mL        PHYSICAL EXAM:  Vital Signs Last 24 Hrs  T(C): 36.9 (12 Aug 2023 06:02), Max: 37 (11 Aug 2023 19:38)  T(F): 98.4 (12 Aug 2023 06:02), Max: 98.6 (11 Aug 2023 19:38)  HR: 79 (12 Aug 2023 06:02) (75 - 79)  BP: 124/79 (12 Aug 2023 06:02) (96/61 - 124/79)  BP(mean): --  RR: 18 (12 Aug 2023 06:02) (18 - 18)  SpO2: 98% (12 Aug 2023 06:02) (98% - 98%)    Parameters below as of 12 Aug 2023 06:02  Patient On (Oxygen Delivery Method): room air      CONSTITUTIONAL: NAD, well-developed, well-groomed  EYES: EOMI; conjunctiva and sclera clear  ENMT: Moist oral mucosa;  normal dentition  NECK: Trachea at midline  RESPIRATORY: Normal respiratory effort; lungs are clear to auscultation bilaterally  CARDIOVASCULAR: Regular rate and rhythm, normal S1 and S2, no murmur/rub/gallop; No lower extremity edema  ABDOMEN: Soft to palpation, no rebound/guarding;  MUSCULOSKELETAL:  Normal gait; no clubbing or cyanosis of digits  PSYCH: affect appropriate  NEUROLOGY: A+O to person, place, and time; no gross motor deficits     LABS:                        10.7   5.36  )-----------( 291      ( 11 Aug 2023 07:04 )             34.7     08-11    140  |  105  |  10  ----------------------------<  82  3.9   |  22  |  0.44<L>    Ca    9.7      11 Aug 2023 07:04    TPro  6.9  /  Alb  3.8  /  TBili  0.3  /  DBili  x   /  AST  19  /  ALT  14  /  AlkPhos  61  08-10    PT/INR - ( 11 Aug 2023 07:04 )   PT: 11.5 sec;   INR: 1.05 ratio         PTT - ( 10 Aug 2023 16:10 )  PTT:27.4 sec      Urinalysis Basic - ( 11 Aug 2023 07:04 )    Color: x / Appearance: x / SG: x / pH: x  Gluc: 82 mg/dL / Ketone: x  / Bili: x / Urobili: x   Blood: x / Protein: x / Nitrite: x   Leuk Esterase: x / RBC: x / WBC x   Sq Epi: x / Non Sq Epi: x / Bacteria: x          RADIOLOGY & ADDITIONAL TESTS:  Results Reviewed:   Imaging Personally Reviewed:  Electrocardiogram Personally Reviewed:    COORDINATION OF CARE:  Care Discussed with Consultants/Other Providers [Y/N]: Y  Prior or Outpatient Records Reviewed [Y/N]:

## 2023-08-12 NOTE — PROGRESS NOTE ADULT - PROBLEM SELECTOR PLAN 3
With deep palpation LLQ and periumbilical  CT a/p: large stool burden, endometrial thickening vs fluid  Order abdominal US    Bowel regimen With deep palpation LLQ and periumbilical  CT a/p: large stool burden, endometrial thickening vs fluid  F/u abdominal US   Bowel regimen - Miralax and Senna

## 2023-08-12 NOTE — DIETITIAN INITIAL EVALUATION ADULT - NSFNSPHYEXAMSKINFT_GEN_A_CORE
Dosing wt no ht or wt in chart at this time; per bedscale wt 8/12/23 34.3 kg (~76 pounds )  Skin: stage 2 pressure injury to sacrum per wound care and as per flowsheets

## 2023-08-13 LAB
ANION GAP SERPL CALC-SCNC: 13 MMOL/L — SIGNIFICANT CHANGE UP (ref 5–17)
BUN SERPL-MCNC: 11 MG/DL — SIGNIFICANT CHANGE UP (ref 7–23)
CALCIUM SERPL-MCNC: 9.8 MG/DL — SIGNIFICANT CHANGE UP (ref 8.4–10.5)
CHLORIDE SERPL-SCNC: 105 MMOL/L — SIGNIFICANT CHANGE UP (ref 96–108)
CO2 SERPL-SCNC: 20 MMOL/L — LOW (ref 22–31)
CREAT SERPL-MCNC: 0.41 MG/DL — LOW (ref 0.5–1.3)
EGFR: 97 ML/MIN/1.73M2 — SIGNIFICANT CHANGE UP
GLUCOSE SERPL-MCNC: 93 MG/DL — SIGNIFICANT CHANGE UP (ref 70–99)
HCT VFR BLD CALC: 35.4 % — SIGNIFICANT CHANGE UP (ref 34.5–45)
HGB BLD-MCNC: 11.2 G/DL — LOW (ref 11.5–15.5)
MCHC RBC-ENTMCNC: 26.5 PG — LOW (ref 27–34)
MCHC RBC-ENTMCNC: 31.6 GM/DL — LOW (ref 32–36)
MCV RBC AUTO: 83.7 FL — SIGNIFICANT CHANGE UP (ref 80–100)
NRBC # BLD: 0 /100 WBCS — SIGNIFICANT CHANGE UP (ref 0–0)
PLATELET # BLD AUTO: 306 K/UL — SIGNIFICANT CHANGE UP (ref 150–400)
POTASSIUM SERPL-MCNC: 3.8 MMOL/L — SIGNIFICANT CHANGE UP (ref 3.5–5.3)
POTASSIUM SERPL-SCNC: 3.8 MMOL/L — SIGNIFICANT CHANGE UP (ref 3.5–5.3)
RBC # BLD: 4.23 M/UL — SIGNIFICANT CHANGE UP (ref 3.8–5.2)
RBC # FLD: 15.9 % — HIGH (ref 10.3–14.5)
SODIUM SERPL-SCNC: 138 MMOL/L — SIGNIFICANT CHANGE UP (ref 135–145)
WBC # BLD: 7.3 K/UL — SIGNIFICANT CHANGE UP (ref 3.8–10.5)
WBC # FLD AUTO: 7.3 K/UL — SIGNIFICANT CHANGE UP (ref 3.8–10.5)

## 2023-08-13 PROCEDURE — 99232 SBSQ HOSP IP/OBS MODERATE 35: CPT

## 2023-08-13 RX ADMIN — SENNA PLUS 2 TABLET(S): 8.6 TABLET ORAL at 22:59

## 2023-08-13 RX ADMIN — ENOXAPARIN SODIUM 40 MILLIGRAM(S): 100 INJECTION SUBCUTANEOUS at 00:10

## 2023-08-13 NOTE — PROGRESS NOTE ADULT - PROBLEM SELECTOR PLAN 3
With deep palpation LLQ and periumbilical  CT a/p: large stool burden, endometrial thickening vs fluid  F/u abdominal US   Bowel regimen - Miralax and Senna With deep palpation LLQ and periumbilical  CT a/p: large stool burden, endometrial thickening vs fluid  F/u outpatient Gyn for TVUS   Bowel regimen - Miralax and Senna With deep palpation LLQ and periumbilical - now resolved.   CT a/p: large stool burden, endometrial thickening vs fluid  F/u outpatient Gyn for TVUS   Bowel regimen - Miralax and Senna

## 2023-08-13 NOTE — PROGRESS NOTE ADULT - PROBLEM SELECTOR PLAN 6
Unstageable sacra wound, prior to admission   Clean wound with saline daily,  apply medi-honey and cover with a foam dressing every day  Wound care consult- Continue w/ attends under pads and Pericare w/ Purewick care as per protocol

## 2023-08-13 NOTE — DISCHARGE NOTE PROVIDER - HOSPITAL COURSE
83F bedbound (x1.5 years) has HHA 8hr x 7d, pmh chronic lower back pain, recurrent UTIs recent admission at  for metabolic encephalopathy 2/2 recurrent UTI & cystitis presents for eval of recurrent falls ( has 2 chart,  Mrn for 2nd chart = [id number]). Pt bed bound after having w/multiple falls w/subsequent chronic back pain. pt lives alone, dependent for ADLs & iADLS, family c/f safety.     CT L & T spine on 08/10/23 showing age indeterminate compression fx. c/o back pain with ambulation. Ortho recs WBAT with PT/OT. s/p TLSO brace for stability. PT reccs JENIFFER ; family would however want her discharged home ; will need care at night since grandson works night shifts ; family trying to facilitate HHA available for 8 hours during the day  If family unable to obtain care at night JENIFFER would be the safer dispo plan.    Unstageable sacral wound, prior to admission. Clean wound with saline daily,  apply medi-honey and cover with a foam dressing every day. Wound care consulted ; reccs  to continue w/ attends under pads and Pericare w/ Purewick care as per protocol.   83F bedbound (x1.5 years) has HHA 8hr x 7d, pmh chronic lower back pain, recurrent UTIs recent admission at  for metabolic encephalopathy 2/2 recurrent UTI & cystitis presents for eval of recurrent falls ( has 2 chart,  Mrn for 2nd chart = [id number]). Pt bed bound after having w/multiple falls w/subsequent chronic back pain. pt lives alone, dependent for ADLs & iADLS, family c/f safety. Patient was admitted for eval of recurrent falls c/f safety admission for PT/OT for dispo planning. Patient appeared to be afebrile, VSS, nontoxic , EKG NSR,  CXR with no acute process, and CT L & T spine: age indeterminate compression fx. Per Ortho, patient is to be WBAT and to use TLSO brace . PT reccs JENIFFER ; family would however want her discharged home ; will need care at night since grandson works night shifts ; family trying to facilitate HHA available for 8 hours during the day. Family is able to establish care at night.     Patient also noted for abdominal tenderness with deep palpation of LLQ and periumbilical. CT a/p: demonstrated large stool burden, endometrial thickening vs fluid. Patient is on a bowel regimen and abdominal pain has now resolved. She is to follow up with GYN for TVUS as outpatient for endometrial thickening. Of note, patient has an unstage-able sacral wound, prior to admission. Per wound care instructions include the following to Clean wound with saline daily,  apply medi-honey and cover with a foam dressing every day. Wound care consulted ; reccs  to continue w/ attends under pads and Pericare w/ Purewick care as per protocol.    Patient is medically clear for discharge by Dr. Zacarias to Home. Outpatient follow up with PCP, GYN,   Med recc and clearance discussed with attending

## 2023-08-13 NOTE — PROGRESS NOTE ADULT - PROBLEM SELECTOR PLAN 2
CT L & T spine: age indeterminate compression fx  Ortho recs WBAT with PT/OT  PT/OT consult for eval

## 2023-08-13 NOTE — DISCHARGE NOTE PROVIDER - NSDCMRMEDTOKEN_GEN_ALL_CORE_FT
melatonin 3 mg oral tablet: 1 tab(s) orally once a day (at bedtime) As needed Insomnia  polyethylene glycol 3350 oral powder for reconstitution: 17 gram(s) orally once a day  senna leaf extract oral tablet: 2 tab(s) orally once a day (at bedtime)

## 2023-08-13 NOTE — DISCHARGE NOTE PROVIDER - NSDCCPCAREPLAN_GEN_ALL_CORE_FT
PRINCIPAL DISCHARGE DIAGNOSIS  Diagnosis: Chronic back pain  Assessment and Plan of Treatment:       SECONDARY DISCHARGE DIAGNOSES  Diagnosis: Thickened endometrium  Assessment and Plan of Treatment: Incidental finding of 6 mm endometrial thickening versus fluid appreciated on CT scan. Please follow up with GYN upon discharge for possibel US for further evaluation     PRINCIPAL DISCHARGE DIAGNOSIS  Diagnosis: Falls  Assessment and Plan of Treatment: EKG normal sinus rhythym. Chest xray is unremarkable. CT of Thoracic and lumbar spine. CT L & T spine: age indeterminate compression fx  Ortho reccomending Weight bearing as tolerated (WBAT) and to use TLSO brace.         SECONDARY DISCHARGE DIAGNOSES  Diagnosis: Thickened endometrium  Assessment and Plan of Treatment: Incidental finding of 6 mm endometrial thickening versus fluid appreciated on CT scan. Please follow up with GYN upon discharge for possible US for further evaluation    Diagnosis: Sacral wound  Assessment and Plan of Treatment: Wound care instructions  Clean wound with saline daily,  apply medi-honey and cover with a foam dressing every day. Wound care consulted ; reccs  to continue w/ attends under pads and Pericare w/ Purewick care as per protocol.    Diagnosis: Abdominal tenderness  Assessment and Plan of Treatment: CT abdomen and pelvis showed some stool burden you were given a bowel regimen. Continue taking miralax and senna upon discharge to prevent constipation.    Diagnosis: Dementia  Assessment and Plan of Treatment: Monitor for constipation, urinary retention, pain, hunger, thirst etc.  Promote sleep wake cycle and reorientation as indicated.

## 2023-08-13 NOTE — DISCHARGE NOTE PROVIDER - CARE PROVIDER_API CALL
Wound, Center  Wound Center   [address] [facility] [phone]  Phone: (   )    -  Fax: (   )    -  Follow Up Time:

## 2023-08-13 NOTE — DISCHARGE NOTE PROVIDER - NSDCFUADDAPPT_GEN_ALL_CORE_FT
APPTS ARE READY TO BE MADE: [x ] YES    Best Family or Patient Contact (if needed):    Additional Information about above appointments (if needed):    1:   2:   3:     Other comments or requests:    APPTS ARE READY TO BE MADE: [x ] YES    Best Family or Patient Contact (if needed):    Additional Information about above appointments (if needed):    1:   2:   3:     Other comments or requests:     Patient is being discharged to rehab. Caregiver will arrange follow up.

## 2023-08-13 NOTE — DISCHARGE NOTE PROVIDER - PROVIDER TOKENS
FREE:[LAST:[Wound],FIRST:[Center],PHONE:[(   )    -],FAX:[(   )    -],ADDRESS:[Wound Center   [address] [place] [address] [phone]]]

## 2023-08-13 NOTE — DISCHARGE NOTE PROVIDER - NSFOLLOWUPCLINICS_GEN_ALL_ED_FT
[facility] Gynecology and Obstetrics  Gynceology/OB  [address] [place] [address]  Phone: [phone]  Fax:   Follow Up Time: 1 week     [facility] Gynecology and Obstetrics  Gynceology/OB  [address] [place] [address]  Phone: [phone]  Fax:   Follow Up Time: 1 week    [facility] - Primary Care  Primary Care  [address] [facility] [place] [address]  Phone: [phone]  Fax:      [facility] - Primary Care  [facility]  [address] [facility] [place] [address]  Phone: [phone]  Fax:   Follow Up Time: Routine    [facility] Gynecology and Obstetrics  Gynceology/OB  [address] [place] [address]  Phone: [phone]  Fax:   Follow Up Time: 1 week     [facility] - Primary Care  Primary [facility]  [address] [facility] [place] [address]  Phone: [phone]  Fax:   Follow Up Time: 1 week    [facility] Gynecology and Obstetrics  Gynceology/OB  [address] [place] [address]  Phone: [phone]  Fax:   Follow Up Time: 1 week

## 2023-08-13 NOTE — DISCHARGE NOTE PROVIDER - NSFOLLOWUPCLINICSTOKEN_GEN_ALL_ED_FT
490669:1 week|| ||00\01||False; 665436:1 week|| ||00\01||False;036513: || ||00\01||False; 405906:Routine|| ||00\01||False;911354:1 week|| ||00\01||False; 771690:1 week|| ||00\01||False;486706:1 week|| ||00\01||False;

## 2023-08-13 NOTE — PROGRESS NOTE ADULT - SUBJECTIVE AND OBJECTIVE BOX
[facility] Division of Hospital Medicine  Suzanne Aquino DO  Pager (M-F, 8A-5P): MS Teams PREFERRED      SUBJECTIVE / OVERNIGHT EVENTS: No acute events overnight. Patient with no acute complaints.     ADDITIONAL REVIEW OF SYSTEMS:    MEDICATIONS  (STANDING):  enoxaparin Injectable 40 milliGRAM(s) SubCutaneous every 24 hours  polyethylene glycol 3350 17 Gram(s) Oral daily  senna 2 Tablet(s) Oral at bedtime    MEDICATIONS  (PRN):  acetaminophen     Tablet .. 650 milliGRAM(s) Oral every 6 hours PRN Temp greater or equal to 38C (100.4F), Mild Pain (1 - 3)  aluminum hydroxide/magnesium hydroxide/simethicone Suspension 30 milliLiter(s) Oral every 4 hours PRN Dyspepsia  melatonin 3 milliGRAM(s) Oral at bedtime PRN Insomnia  ondansetron Injectable 4 milliGRAM(s) IV Push every 8 hours PRN Nausea and/or Vomiting      I&O's Summary    12 Aug 2023 07:01  -  13 Aug 2023 07:00  --------------------------------------------------------  IN: 360 mL / OUT: 0 mL / NET: 360 mL    13 Aug 2023 07:01  -  13 Aug 2023 12:58  --------------------------------------------------------  IN: 120 mL / OUT: 0 mL / NET: 120 mL        PHYSICAL EXAM:  Vital Signs Last 24 Hrs  T(C): 36.7 (13 Aug 2023 11:42), Max: 36.9 (13 Aug 2023 05:07)  T(F): 98 (13 Aug 2023 11:42), Max: 98.4 (13 Aug 2023 05:07)  HR: 86 (13 Aug 2023 11:42) (72 - 89)  BP: 102/69 (13 Aug 2023 11:42) (102/69 - 121/79)  BP(mean): --  RR: 18 (13 Aug 2023 11:42) (18 - 18)  SpO2: 97% (13 Aug 2023 11:42) (97% - 98%)    Parameters below as of 13 Aug 2023 11:42  Patient On (Oxygen Delivery Method): room air    CONSTITUTIONAL: NAD, well-developed, well-groomed  EYES: EOMI; conjunctiva and sclera clear  ENMT: Moist oral mucosa;  normal dentition  NECK: Trachea at midline  RESPIRATORY: Normal respiratory effort; lungs are clear to auscultation bilaterally  CARDIOVASCULAR: Regular rate and rhythm, normal S1 and S2, no murmur/rub/gallop; No lower extremity edema  ABDOMEN: Soft to palpation, no rebound/guarding;  MUSCULOSKELETAL:  Normal gait; no clubbing or cyanosis of digits  PSYCH: affect appropriate  NEUROLOGY: A+O to person, place, and time; no gross motor deficits     LABS:                        11.2   7.30  )-----------( 306      ( 13 Aug 2023 06:43 )             35.4     08-13    138  |  105  |  11  ----------------------------<  93  3.8   |  20<L>  |  0.41<L>    Ca    9.8      13 Aug 2023 06:43            Urinalysis Basic - ( 13 Aug 2023 06:43 )    Color: x / Appearance: x / SG: x / pH: x  Gluc: 93 mg/dL / Ketone: x  / Bili: x / Urobili: x   Blood: x / Protein: x / Nitrite: x   Leuk Esterase: x / RBC: x / WBC x   Sq Epi: x / Non Sq Epi: x / Bacteria: x          RADIOLOGY & ADDITIONAL TESTS:  Results Reviewed:   Imaging Personally Reviewed:  Electrocardiogram Personally Reviewed:    COORDINATION OF CARE:  Care Discussed with Consultants/Other Providers [Y/N]: Y  Prior or Outpatient Records Reviewed [Y/N]: Y

## 2023-08-13 NOTE — PROGRESS NOTE ADULT - NSPROGADDITIONALINFOA_GEN_ALL_CORE
Time-based billing (NON-critical care).     36 minutes spent on total encounter. The necessity of the time spent during the encounter on this date of service was due to:    Time-based billing (NON-critical care).     The necessity of the time spent during the encounter on this date of service was due to:     - Ordering, reviewing, and interpreting labs, testing, and imaging.  - Independently obtaining a review of systems and performing a physical exam  - Reviewing prior records and where necessary, outpatient records.  - Counselling and educating patient and family regarding interpretation of aforementioned items and plan of care.    d/w ACP

## 2023-08-13 NOTE — DISCHARGE NOTE PROVIDER - DETAILS OF MALNUTRITION DIAGNOSIS/DIAGNOSES
This patient has been assessed with a concern for Malnutrition and was treated during this hospitalization for the following Nutrition diagnosis/diagnoses:     -  08/12/2023: Severe protein-calorie malnutrition

## 2023-08-14 PROCEDURE — 99232 SBSQ HOSP IP/OBS MODERATE 35: CPT

## 2023-08-14 RX ADMIN — SENNA PLUS 2 TABLET(S): 8.6 TABLET ORAL at 22:04

## 2023-08-14 RX ADMIN — ENOXAPARIN SODIUM 40 MILLIGRAM(S): 100 INJECTION SUBCUTANEOUS at 00:23

## 2023-08-14 NOTE — PROGRESS NOTE ADULT - PROBLEM SELECTOR PLAN 6
Unstageable sacral wound, prior to admission   Clean wound with saline daily,  apply medi-honey and cover with a foam dressing every day  Wound care consult- Continue w/ attends under pads and Pericare w/ Purewick care as per protocol

## 2023-08-14 NOTE — PROGRESS NOTE ADULT - SUBJECTIVE AND OBJECTIVE BOX
Patient is a 83y old  Female who presents with a chief complaint of Frequent falls (13 Aug 2023 17:57)      SUBJECTIVE / OVERNIGHT EVENTS:  Pt seen and examined. No acute events overnight.    MEDICATIONS  (STANDING):  enoxaparin Injectable 40 milliGRAM(s) SubCutaneous every 24 hours  polyethylene glycol 3350 17 Gram(s) Oral daily  senna 2 Tablet(s) Oral at bedtime    MEDICATIONS  (PRN):  acetaminophen     Tablet .. 650 milliGRAM(s) Oral every 6 hours PRN Temp greater or equal to 38C (100.4F), Mild Pain (1 - 3)  aluminum hydroxide/magnesium hydroxide/simethicone Suspension 30 milliLiter(s) Oral every 4 hours PRN Dyspepsia  melatonin 3 milliGRAM(s) Oral at bedtime PRN Insomnia  ondansetron Injectable 4 milliGRAM(s) IV Push every 8 hours PRN Nausea and/or Vomiting      Vital Signs Last 24 Hrs  T(C): 36.9 (14 Aug 2023 12:01), Max: 36.9 (13 Aug 2023 19:36)  T(F): 98.4 (14 Aug 2023 12:01), Max: 98.5 (13 Aug 2023 19:36)  HR: 87 (14 Aug 2023 12:01) (80 - 87)  BP: 106/67 (14 Aug 2023 12:01) (106/67 - 123/69)  BP(mean): --  RR: 18 (14 Aug 2023 12:01) (18 - 18)  SpO2: 97% (14 Aug 2023 12:01) (95% - 97%)    Parameters below as of 14 Aug 2023 12:01  Patient On (Oxygen Delivery Method): room air      CAPILLARY BLOOD GLUCOSE        I&O's Summary    13 Aug 2023 07:01  -  14 Aug 2023 07:00  --------------------------------------------------------  IN: 240 mL / OUT: 0 mL / NET: 240 mL        PHYSICAL EXAM:  GENERAL: NAD, well-developed  HEAD:  Atraumatic, Normocephalic  EYES: EOMI, PERRLA, conjunctiva and sclera clear  NECK: Supple, No JVD  CHEST/LUNG: Clear to auscultation bilaterally; No wheeze  HEART: Regular rate and rhythm; No murmurs, rubs, or gallops  ABDOMEN: Soft, Nontender, Nondistended; Bowel sounds present  EXTREMITIES:  2+ Peripheral Pulses, No clubbing, cyanosis, or edema  PSYCH: AAOx3  NEUROLOGY: non-focal  SKIN: No rashes or lesions    LABS:                        11.2   7.30  )-----------( 306      ( 13 Aug 2023 06:43 )             35.4     08-13    138  |  105  |  11  ----------------------------<  93  3.8   |  20<L>  |  0.41<L>    Ca    9.8      13 Aug 2023 06:43            Urinalysis Basic - ( 13 Aug 2023 06:43 )    Color: x / Appearance: x / SG: x / pH: x  Gluc: 93 mg/dL / Ketone: x  / Bili: x / Urobili: x   Blood: x / Protein: x / Nitrite: x   Leuk Esterase: x / RBC: x / WBC x   Sq Epi: x / Non Sq Epi: x / Bacteria: x        RADIOLOGY & ADDITIONAL TESTS:    Imaging Personally Reviewed:    Consultant(s) Notes Reviewed:      Care Discussed with Consultants/Other Providers:   Patient is a 83y old  Female who presents with a chief complaint of Frequent falls (13 Aug 2023 17:57)      SUBJECTIVE / OVERNIGHT EVENTS:  Pt seen and examined. No acute events overnight. Limited ROS on account of cognitive impairment. c/o back pain with movement.    MEDICATIONS  (STANDING):  enoxaparin Injectable 40 milliGRAM(s) SubCutaneous every 24 hours  polyethylene glycol 3350 17 Gram(s) Oral daily  senna 2 Tablet(s) Oral at bedtime    MEDICATIONS  (PRN):  acetaminophen     Tablet .. 650 milliGRAM(s) Oral every 6 hours PRN Temp greater or equal to 38C (100.4F), Mild Pain (1 - 3)  aluminum hydroxide/magnesium hydroxide/simethicone Suspension 30 milliLiter(s) Oral every 4 hours PRN Dyspepsia  melatonin 3 milliGRAM(s) Oral at bedtime PRN Insomnia  ondansetron Injectable 4 milliGRAM(s) IV Push every 8 hours PRN Nausea and/or Vomiting      Vital Signs Last 24 Hrs  T(C): 36.9 (14 Aug 2023 12:01), Max: 36.9 (13 Aug 2023 19:36)  T(F): 98.4 (14 Aug 2023 12:01), Max: 98.5 (13 Aug 2023 19:36)  HR: 87 (14 Aug 2023 12:01) (80 - 87)  BP: 106/67 (14 Aug 2023 12:01) (106/67 - 123/69)  BP(mean): --  RR: 18 (14 Aug 2023 12:01) (18 - 18)  SpO2: 97% (14 Aug 2023 12:01) (95% - 97%)    Parameters below as of 14 Aug 2023 12:01  Patient On (Oxygen Delivery Method): room air      CAPILLARY BLOOD GLUCOSE        I&O's Summary    13 Aug 2023 07:01  -  14 Aug 2023 07:00  --------------------------------------------------------  IN: 240 mL / OUT: 0 mL / NET: 240 mL        PHYSICAL EXAM:  GENERAL: NAD, frail  HEAD:  Atraumatic, Normocephalic  EYES: conjunctiva and sclera clear  NECK: Supple, No JVD  CHEST/LUNG: Clear to auscultation bilaterally; No wheeze  HEART: Regular rate and rhythm; No murmurs, rubs, or gallops  ABDOMEN: Soft, Nontender, Nondistended; Bowel sounds present  EXTREMITIES:  2+ Peripheral Pulses, No clubbing, cyanosis, or edema  PSYCH: AAOx3  NEUROLOGY: non-focal  SKIN: No rashes or lesions    LABS:                        11.2   7.30  )-----------( 306      ( 13 Aug 2023 06:43 )             35.4     08-13    138  |  105  |  11  ----------------------------<  93  3.8   |  20<L>  |  0.41<L>    Ca    9.8      13 Aug 2023 06:43            Urinalysis Basic - ( 13 Aug 2023 06:43 )    Color: x / Appearance: x / SG: x / pH: x  Gluc: 93 mg/dL / Ketone: x  / Bili: x / Urobili: x   Blood: x / Protein: x / Nitrite: x   Leuk Esterase: x / RBC: x / WBC x   Sq Epi: x / Non Sq Epi: x / Bacteria: x        RADIOLOGY & ADDITIONAL TESTS:    Imaging Personally Reviewed:    CT  Thoracic spine:   T12 superior endplate compression fracture, age   indeterminate. No canal compromise or vertebral malalignment results    CT  Lumbar spine:   L1 superior endplate compression fracture, age   indeterminate. No canal compromise or vertebral malalignment results.   Slight retrolisthesis of L3 on L4. No significant degenerative central   canal stenosis. Degenerative foraminal stenosis

## 2023-08-14 NOTE — PROGRESS NOTE ADULT - PROBLEM SELECTOR PLAN 3
With deep palpation LLQ and periumbilical - now resolved.   CT a/p: large stool burden, endometrial thickening vs fluid  F/u outpatient Gyn for TVUS   Bowel regimen - Miralax and Senna

## 2023-08-14 NOTE — CHART NOTE - NSCHARTNOTEFT_GEN_A_CORE
Patient was measured and dispensed a TLSO rigid posterior panel extending from sacrococcygeal junction to the scapular spine with a soft apron front. The TLSO will stabilize and control the spine safely protect the fracture sites and assist in the healing process. Care use and function were explained. All went without incident.   Hiro Mistry [facility]  [phone]
Patient has T12 endplate compression fracture, indeterminate.    Discussed with orthopedics, and advised to have patient WBAT.        Coretta Porter PA-C  Medicine

## 2023-08-14 NOTE — PROGRESS NOTE ADULT - PROBLEM SELECTOR PLAN 2
CT L & T spine: age indeterminate compression fx  c/o back pain with ambulation  Ortho recs WBAT with PT/OT  would obtain Orthotics eval for TLSO brace for stability  PT/OT

## 2023-08-15 PROCEDURE — 99232 SBSQ HOSP IP/OBS MODERATE 35: CPT

## 2023-08-15 RX ADMIN — ENOXAPARIN SODIUM 40 MILLIGRAM(S): 100 INJECTION SUBCUTANEOUS at 01:08

## 2023-08-15 RX ADMIN — POLYETHYLENE GLYCOL 3350 17 GRAM(S): 17 POWDER, FOR SOLUTION ORAL at 11:37

## 2023-08-15 RX ADMIN — SENNA PLUS 2 TABLET(S): 8.6 TABLET ORAL at 21:25

## 2023-08-15 NOTE — PROGRESS NOTE ADULT - SUBJECTIVE AND OBJECTIVE BOX
Patient is a 83y old  Female who presents with a chief complaint of Frequent falls (15 Aug 2023 11:26)      SUBJECTIVE / OVERNIGHT EVENTS: Pt seen and examined w/family at bedside.  No acute events overnight. Limited ROS on account of cognitive impairment. c/o back pain with movement.      MEDICATIONS  (STANDING):  enoxaparin Injectable 40 milliGRAM(s) SubCutaneous every 24 hours  polyethylene glycol 3350 17 Gram(s) Oral daily  senna 2 Tablet(s) Oral at bedtime    MEDICATIONS  (PRN):  acetaminophen     Tablet .. 650 milliGRAM(s) Oral every 6 hours PRN Temp greater or equal to 38C (100.4F), Mild Pain (1 - 3)  aluminum hydroxide/magnesium hydroxide/simethicone Suspension 30 milliLiter(s) Oral every 4 hours PRN Dyspepsia  melatonin 3 milliGRAM(s) Oral at bedtime PRN Insomnia  ondansetron Injectable 4 milliGRAM(s) IV Push every 8 hours PRN Nausea and/or Vomiting      Vital Signs Last 24 Hrs  T(C): 36.8 (15 Aug 2023 06:02), Max: 36.8 (15 Aug 2023 06:02)  T(F): 98.3 (15 Aug 2023 06:02), Max: 98.3 (15 Aug 2023 06:02)  HR: 85 (15 Aug 2023 06:02) (80 - 85)  BP: 106/65 (15 Aug 2023 06:02) (103/67 - 106/65)  BP(mean): --  RR: 18 (15 Aug 2023 06:02) (18 - 18)  SpO2: 96% (15 Aug 2023 06:02) (95% - 96%)    Parameters below as of 15 Aug 2023 06:02  Patient On (Oxygen Delivery Method): room air      CAPILLARY BLOOD GLUCOSE        I&O's Summary    14 Aug 2023 07:01  -  15 Aug 2023 07:00  --------------------------------------------------------  IN: 240 mL / OUT: 0 mL / NET: 240 mL    15 Aug 2023 07:01  -  15 Aug 2023 13:13  --------------------------------------------------------  IN: 240 mL / OUT: 0 mL / NET: 240 mL        PHYSICAL EXAM:  GENERAL: NAD, frail  HEAD:  Atraumatic, Normocephalic  EYES: conjunctiva and sclera clear  NECK: Supple, No JVD  CHEST/LUNG: Clear to auscultation bilaterally; No wheeze  HEART: Regular rate and rhythm; No murmurs, rubs, or gallops  ABDOMEN: Soft, Nontender, Nondistended; Bowel sounds present  EXTREMITIES:  2+ Peripheral Pulses, No clubbing, cyanosis, or edema  PSYCH: AAOx3  NEUROLOGY: non-focal  SKIN: No rashes or lesions

## 2023-08-15 NOTE — PROGRESS NOTE ADULT - PROBLEM SELECTOR PLAN 2
CT L & T spine: age indeterminate compression fx  c/o back pain with ambulation  Ortho recs WBAT with PT/OT  s/p TLSO brace for stability  PT reccs JENIFFER ; family would however want her discharged home ; CM to facilitate

## 2023-08-15 NOTE — PATIENT PROFILE ADULT - FALL HARM RISK - HARM RISK INTERVENTIONS
Assistance with ambulation/Assistance OOB with selected safe patient handling equipment/Communicate Risk of Fall with Harm to all staff/Discuss with provider need for PT consult/Monitor gait and stability/Reinforce activity limits and safety measures with patient and family/Tailored Fall Risk Interventions/Visual Cue: Yellow wristband and red socks/Bed in lowest position, wheels locked, appropriate side rails in place/Call bell, personal items and telephone in reach/Instruct patient to call for assistance before getting out of bed or chair/Non-slip footwear when patient is out of bed/[place] to call system/Physically safe environment - no spills, clutter or unnecessary equipment/Purposeful Proactive Rounding/Room/bathroom lighting operational, light cord in reach

## 2023-08-15 NOTE — PROGRESS NOTE ADULT - NSPROGADDITIONALINFOA_GEN_ALL_CORE
time spent reviewing prior charts, meds, discussing plan with patient= 41 minutes    d/w ACP Sonia time spent reviewing prior charts, meds, discussing plan with patient's family= 41 minutes    d/w ACP Sonia

## 2023-08-16 PROCEDURE — 99232 SBSQ HOSP IP/OBS MODERATE 35: CPT

## 2023-08-16 RX ADMIN — POLYETHYLENE GLYCOL 3350 17 GRAM(S): 17 POWDER, FOR SOLUTION ORAL at 11:18

## 2023-08-16 RX ADMIN — ENOXAPARIN SODIUM 40 MILLIGRAM(S): 100 INJECTION SUBCUTANEOUS at 23:16

## 2023-08-16 RX ADMIN — SENNA PLUS 2 TABLET(S): 8.6 TABLET ORAL at 21:58

## 2023-08-16 RX ADMIN — ENOXAPARIN SODIUM 40 MILLIGRAM(S): 100 INJECTION SUBCUTANEOUS at 00:58

## 2023-08-16 NOTE — PROGRESS NOTE ADULT - PROBLEM SELECTOR PLAN 6
Unstageable sacral wound, prior to admission   Clean wound with saline daily,  apply medi-honey and cover with a foam dressing every day  Wound care consulted ; reccs  to continue w/ attends under pads and Pericare w/ Purewick care as per protocol

## 2023-08-16 NOTE — PROGRESS NOTE ADULT - NSPROGADDITIONALINFOA_GEN_ALL_CORE
time spent reviewing prior charts, meds, discussing plan with patient= 40 minutes    d/w ACP Geovanna

## 2023-08-16 NOTE — PROGRESS NOTE ADULT - PROBLEM SELECTOR PLAN 2
CT L & T spine: age indeterminate compression fx  c/o back pain with ambulation  Ortho recs WBAT with PT/OT  s/p TLSO brace for stability  PT reccs JENIFFER ; family would however want her discharged home ; will need care at night since grandson works night shifts ; family trying to facilitate  HHA available for 8 hours during the day  If family unable to obtain care at night JENIFFER would be the safer dispo plan

## 2023-08-16 NOTE — PROGRESS NOTE ADULT - SUBJECTIVE AND OBJECTIVE BOX
Patient is a 83y old  Female who presents with a chief complaint of Frequent falls (15 Aug 2023 11:26)      SUBJECTIVE / OVERNIGHT EVENTS:  Pt seen and examined.  No acute events overnight. Limited ROS on account of cognitive impairment. c/o back pain with movement.        MEDICATIONS  (STANDING):  enoxaparin Injectable 40 milliGRAM(s) SubCutaneous every 24 hours  polyethylene glycol 3350 17 Gram(s) Oral daily  senna 2 Tablet(s) Oral at bedtime    MEDICATIONS  (PRN):  acetaminophen     Tablet .. 650 milliGRAM(s) Oral every 6 hours PRN Temp greater or equal to 38C (100.4F), Mild Pain (1 - 3)  aluminum hydroxide/magnesium hydroxide/simethicone Suspension 30 milliLiter(s) Oral every 4 hours PRN Dyspepsia  melatonin 3 milliGRAM(s) Oral at bedtime PRN Insomnia  ondansetron Injectable 4 milliGRAM(s) IV Push every 8 hours PRN Nausea and/or Vomiting      Vital Signs Last 24 Hrs  T(C): 36.4 (16 Aug 2023 05:03), Max: 37.2 (15 Aug 2023 19:46)  T(F): 97.6 (16 Aug 2023 05:03), Max: 99 (15 Aug 2023 19:46)  HR: 64 (16 Aug 2023 05:03) (64 - 75)  BP: 102/63 (16 Aug 2023 05:03) (102/63 - 124/77)  BP(mean): --  RR: 18 (16 Aug 2023 05:03) (18 - 18)  SpO2: 98% (16 Aug 2023 05:03) (98% - 98%)    Parameters below as of 16 Aug 2023 05:03  Patient On (Oxygen Delivery Method): room air      CAPILLARY BLOOD GLUCOSE        I&O's Summary    15 Aug 2023 07:01  -  16 Aug 2023 07:00  --------------------------------------------------------  IN: 560 mL / OUT: 0 mL / NET: 560 mL    16 Aug 2023 07:01  -  16 Aug 2023 12:14  --------------------------------------------------------  IN: 250 mL / OUT: 0 mL / NET: 250 mL        PHYSICAL EXAM:  GENERAL: NAD, frail  HEAD:  Atraumatic, Normocephalic  EYES: conjunctiva and sclera clear  NECK: Supple, No JVD  CHEST/LUNG: Clear to auscultation bilaterally; No wheeze  HEART: Regular rate and rhythm; No murmurs, rubs, or gallops  ABDOMEN: Soft, Nontender, Nondistended; Bowel sounds present  EXTREMITIES:  2+ Peripheral Pulses, No clubbing, cyanosis, or edema  PSYCH: AAOx3  NEUROLOGY: non-focal  SKIN: No rashes or lesions

## 2023-08-17 VITALS
HEART RATE: 71 BPM | RESPIRATION RATE: 18 BRPM | DIASTOLIC BLOOD PRESSURE: 60 MMHG | TEMPERATURE: 98 F | SYSTOLIC BLOOD PRESSURE: 99 MMHG | OXYGEN SATURATION: 96 %

## 2023-08-17 PROCEDURE — 85018 HEMOGLOBIN: CPT

## 2023-08-17 PROCEDURE — 85025 COMPLETE CBC W/AUTO DIFF WBC: CPT

## 2023-08-17 PROCEDURE — 85027 COMPLETE CBC AUTOMATED: CPT

## 2023-08-17 PROCEDURE — 99239 HOSP IP/OBS DSCHRG MGMT >30: CPT

## 2023-08-17 PROCEDURE — 82803 BLOOD GASES ANY COMBINATION: CPT

## 2023-08-17 PROCEDURE — 83605 ASSAY OF LACTIC ACID: CPT

## 2023-08-17 PROCEDURE — 85730 THROMBOPLASTIN TIME PARTIAL: CPT

## 2023-08-17 PROCEDURE — 82330 ASSAY OF CALCIUM: CPT

## 2023-08-17 PROCEDURE — 82435 ASSAY OF BLOOD CHLORIDE: CPT

## 2023-08-17 PROCEDURE — 71260 CT THORAX DX C+: CPT | Mod: MA

## 2023-08-17 PROCEDURE — 84132 ASSAY OF SERUM POTASSIUM: CPT

## 2023-08-17 PROCEDURE — 82947 ASSAY GLUCOSE BLOOD QUANT: CPT

## 2023-08-17 PROCEDURE — 97165 OT EVAL LOW COMPLEX 30 MIN: CPT

## 2023-08-17 PROCEDURE — 74177 CT ABD & PELVIS W/CONTRAST: CPT | Mod: MA

## 2023-08-17 PROCEDURE — 71045 X-RAY EXAM CHEST 1 VIEW: CPT

## 2023-08-17 PROCEDURE — 80048 BASIC METABOLIC PNL TOTAL CA: CPT

## 2023-08-17 PROCEDURE — 85610 PROTHROMBIN TIME: CPT

## 2023-08-17 PROCEDURE — 99285 EMERGENCY DEPT VISIT HI MDM: CPT

## 2023-08-17 PROCEDURE — 85014 HEMATOCRIT: CPT

## 2023-08-17 PROCEDURE — 84295 ASSAY OF SERUM SODIUM: CPT

## 2023-08-17 PROCEDURE — 80053 COMPREHEN METABOLIC PANEL: CPT

## 2023-08-17 PROCEDURE — 97530 THERAPEUTIC ACTIVITIES: CPT

## 2023-08-17 PROCEDURE — 97112 NEUROMUSCULAR REEDUCATION: CPT

## 2023-08-17 PROCEDURE — 97110 THERAPEUTIC EXERCISES: CPT

## 2023-08-17 PROCEDURE — 84484 ASSAY OF TROPONIN QUANT: CPT

## 2023-08-17 PROCEDURE — 36415 COLL VENOUS BLD VENIPUNCTURE: CPT

## 2023-08-17 RX ORDER — POLYETHYLENE GLYCOL 3350 17 G/17G
17 POWDER, FOR SOLUTION ORAL
Qty: 0 | Refills: 0 | DISCHARGE
Start: 2023-08-17

## 2023-08-17 RX ORDER — SENNA PLUS 8.6 MG/1
2 TABLET ORAL
Qty: 0 | Refills: 0 | DISCHARGE
Start: 2023-08-17

## 2023-08-17 RX ORDER — LANOLIN ALCOHOL/MO/W.PET/CERES
1 CREAM (GRAM) TOPICAL
Qty: 0 | Refills: 0 | DISCHARGE
Start: 2023-08-17

## 2023-08-17 RX ADMIN — POLYETHYLENE GLYCOL 3350 17 GRAM(S): 17 POWDER, FOR SOLUTION ORAL at 14:38

## 2023-08-17 NOTE — PROGRESS NOTE ADULT - ASSESSMENT
83F bedbound (x1.5 years) has HHA 8hr x7d, PMHx of chronic lower back pain, recurrent UTIs recent admission at  for metabolic encephalopathy 2/2 recurrent UTI & cystitis presents for eval of recurrent falls c/f safety admission for PT/OT for dispo planning 
83F bedbound (x1.5 years) has HHA 8hr x7d, PMHx of chronic lower back pain, recurrent UTIs recent admission at  for metabolic encephalopathy 2/2 recurrent UTI & cystitis presents for eval of recurrent falls c/f safety admission for PT/OT for dispo planning 
83F bedbound (x1.5 years) has HHA 8hr x7d, pmh chronic lower back pain, recurrent UTIs recent admission at  for metabolic encephalopathy 2/2 recurrent UTI & cystitis presents for eval of recurrent falls c/f safety admission for PT/OT, dispo planning 
83F bedbound (x1.5 years) has HHA 8hr x7d, PMHx of chronic lower back pain, recurrent UTIs recent admission at  for metabolic encephalopathy 2/2 recurrent UTI & cystitis presents for eval of recurrent falls c/f safety admission for PT/OT for dispo planning 

## 2023-08-17 NOTE — PROGRESS NOTE ADULT - PROBLEM SELECTOR PLAN 7
DVT Prophylaxis: Lovenox SC  Diet: Regular  GI Prophylaxis: Not Indicated  Activity: Increase As Tolerated  Code Status: Full  Disposition: Acute
DVT Prophylaxis: Lovenox SC  Diet: Regular  GI Prophylaxis: Not Indicated  Activity: Increase As Tolerated  Code Status: Full  Disposition: Acute
DVT Prophylaxis: Lovenox SC  Diet: Regular  GI Prophylaxis: Not Indicated  Activity: Increase As Tolerated  Code Status: Full
DVT Prophylaxis: Lovenox SC  Diet: Regular  GI Prophylaxis: Not Indicated  Activity: Increase As Tolerated  Code Status: Full
DVT Prophylaxis: Lovenox SC  Diet: Regular  GI Prophylaxis: Not Indicated  Activity: Increase As Tolerated  Code Status: Full  Disposition: Acute
DVT Prophylaxis: Lovenox SC  Diet: Regular  GI Prophylaxis: Not Indicated  Activity: Increase As Tolerated  Code Status: Full
DVT Prophylaxis: Lovenox SC  Diet: Regular  GI Prophylaxis: Not Indicated  Activity: Increase As Tolerated  Code Status: Full  Disposition: Acute

## 2023-08-17 NOTE — PROGRESS NOTE ADULT - PROBLEM SELECTOR PLAN 1
Frail elderly pt, bedbound, after having w/multiple falls w/subsequent chronic back pain.  Pt lives alone, dependent for ADLs & iADLS, family c/f safety.   Current has HHA 8hr x7d, family would like to increase hours   Afebrile, VSS, nontoxic   EKG NSR  CXR with no acute process  CT L & T spine: age indeterminate compression fx  PT/OT consult for eval   Ortho rec WBAT  CM & SW for assistance w/ dispo planning
Frail elderly pt, bedbound, after having w/multiple falls w/subsequent chronic back pain.  Pt lives alone, dependent for ADLs & iADLS, family c/f safety.   Current has HHA 8hr x7d, family would like to increase hours   Afebrile, VSS, nontoxic   EKG NSR  CXR with no acute process  CT L & T spine: age indeterminate compression fx  PT/OT   Ortho rec WBAT  CM & SW for assistance w/ dispo planning
Frail elderly pt, bedbound, after having w/multiple falls w/subsequent chronic back pain.  Pt lives alone, dependent for ADLs & iADLS, family c/f safety.   Current has HHA 8hr x7d, family would like to increase hours   Afebrile, VSS, nontoxic   EKG NSR  CXR with no acute process  CT L & T spine: age indeterminate compression fx  PT/OT   Ortho rec WBAT  CM & SW for assistance w/ dispo planning
Frail elderly pt, bedbound, after having w/multiple falls w/subsequent chronic back pain.  Pt lives alone, dependent for ADLs & iADLS, family c/f safety.   Current has HHA 8hr x7d, family would like to increase hours   Afebrile, VSS, nontoxic   EKG NSR  CXR with no acute process  CT L & T spine: age indeterminate compression fx  PT/OT consult for eval   Ortho rec WBAT  CM & SW for assistance w/ dispo planning
Frail elderly pt, bedbound, after having w/multiple falls w/subsequent chronic back pain.  Pt lives alone, dependent for ADLs & iADLS, family c/f safety.   Current has HHA 8hr x7d, family would like to increase hours   Afebrile, VSS, nontoxic   EKG NSR  CXR with no acute process  CT L & T spine: age indeterminate compression fx  PT/OT   Ortho rec WBAT  CM & SW for assistance w/ dispo planning
Frail elderly pt, bedbound, after having w/multiple falls w/subsequent chronic back pain.  Pt lives alone, dependent for ADLs & iADLS, family c/f safety.   Current has HHA 8hr x7d, family would like to increase hours   Afebrile, VSS, nontoxic   EKG NSR  CXR clear   CT L & T spine: age indeterminate compression fx  PT/OT consult for eval   Ortho stated WBAT  CM & SW for assistance w/ dispo planning
Frail elderly pt, bedbound, after having w/multiple falls w/subsequent chronic back pain.  Pt lives alone, dependent for ADLs & iADLS, family c/f safety.   Current has HHA 8hr x7d, family would like to increase hours   Afebrile, VSS, nontoxic   EKG NSR  CXR with no acute process  CT L & T spine: age indeterminate compression fx  PT/OT   Ortho rec WBAT  CM & SW for assistance w/ dispo planning

## 2023-08-17 NOTE — DISCHARGE NOTE NURSING/CASE MANAGEMENT/SOCIAL WORK - NSDCPEFALRISK_GEN_ALL_CORE
For information on Fall & Injury Prevention, visit: [email or web address] OR  [email or web address] OR  https://www.cdc.gov/steadi/patient.html

## 2023-08-17 NOTE — PROGRESS NOTE ADULT - PROBLEM SELECTOR PLAN 5
At baseline mentation   Frequent reassurance and verbal orientation  Family members or other familiar persons by his bedside  Monitor for constipation, urinary retention, pain, hunger, thirst etc.  Promote sleep wake cycle and reorientation as indicated  Minimize use of benzodiazepines, opioids, anticholinergics and other deliriogenic agents whenever possible.
At baseline mentation   Frequent re-orientation

## 2023-08-17 NOTE — PROGRESS NOTE ADULT - PROBLEM SELECTOR PROBLEM 2
Thoracic compression fracture

## 2023-08-17 NOTE — PROGRESS NOTE ADULT - PROBLEM SELECTOR PLAN 3
now resolved.   CT a/p: large stool burden, endometrial thickening vs fluid  F/u outpatient Gyn for TVUS   Bowel regimen - Miralax and Senna

## 2023-08-17 NOTE — DISCHARGE NOTE NURSING/CASE MANAGEMENT/SOCIAL WORK - NSSCCONTNUM_GEN_ALL_CORE
[facility]  H&P- Sg Dalton 1949, 75 y o  female MRN: [id number]  Unit/Bed#: ED 15 Encounter: [id number]  Primary Care Provider: Zaid Silverman MD   Date and time admitted to hospital: 5/17/2022  7:20 PM    * Acute pulmonary embolism with acute cor pulmonale [facility])  Assessment & Plan  Presents with 1 day h/o fatigue, busby, left chest pain & syncope today  · CTA PE with bl PE massive clot burden  · VTE heparin gtt initiated  · Echo ordered  · Pain management  · NOAC prices placed in dc tab for price check  · Tele    Wound dehiscence  Assessment & Plan  · Wound care recs placed    Endometrial cancer ([address] )  Assessment & Plan  · S/p ROBOTIC HYSTERECTOMY, BILATERAL SALPINGO-OOPHORECTOMY, STAGING PERITONEAL AND OMENTAL BIOPSIES, BILATERAL PELVIC SENTINEL LYMPH NODE BIOPSIES (N/A), CYSTOSCOPY (N/A) by Dr Adrian Yeager on 4/02/8445 without complication  · Started chemo 5/11/22 with paxol & PARAPLATIN  · Symptom management  · Gyn/Onco consult since this is in the window of their surgery post-op recovery period and in case they have preferences for [facility] on dc    Stage 3a chronic kidney disease [facility])  Assessment & Plan  Lab Results   Component Value Date    EGFR 33 05/17/2022    EGFR 34 05/16/2022    EGFR 39 05/09/2022    CREATININE 1 52 (H) 05/17/2022    CREATININE 1 50 (H) 05/16/2022    CREATININE 1 33 (H) 05/09/2022     · At baseline    Acute respiratory failure with hypoxia (HCC)  Assessment & Plan  · Requiring 2 L NC to maintain goal SpO2 > 90%  · Titrate as needed  · Treatment for Acute PE as above    Essential hypertension  Assessment & Plan  · Continue home medications      VTE Pharmacologic Prophylaxis: VTE Score: 8 High Risk (Score >/= 5) - Pharmacological DVT Prophylaxis Ordered: heparin drip  Sequential Compression Devices Ordered  Code Status: Level 1 - full code  Discussion with family: Updated  (sister) at bedside      Anticipated Length of Stay: Patient will be admitted on an inpatient basis with an anticipated length of stay of greater than 2 midnights secondary to iv heparin, price check for noac, echo, monitoring vs, cards consult, weaning O2  Total Time for Visit, including Counseling / Coordination of Care: 45 minutes Greater than 50% of this total time spent on direct patient counseling and coordination of care  Chief Complaint:  Syncope 1 episode in the afternoon 5/17    History of Present Illness:  Sg Taylor is a 75 y o  female with a PMH of obesity, hypertension, GERD, endometrial cancer status post [facility] March 2022 who presents with 1 episode of syncope in the afternoon of 5/17 after feeling fatigued from walking up the steps from her basement, she sat down on a chair  After minute of rest, she stood up from the chair and immediately felt like she was going to pass out, her sister with whom she lives ran over to her, noticed she was unresponsive and started CPR  Sister reports that within a few seconds this patient came to, started breathing stronger and said that hurts   Patient arrived via EMS to the hospital     Patient will be admitted for acute bilateral pulmonary embolism with cor pulmonale, cardiology consultation, Gyn Onco consultation      Review of Systems:  Review of Systems    Past Medical and Surgical History:   Past Medical History:   Diagnosis Date    Arthritis     osteo    Chronic kidney disease     Colon polyp     Diverticula of colon     Hypertension     Obesity     Rhinitis        Past Surgical History:   Procedure Laterality Date    APPENDECTOMY      BREAST BIOPSY Left 1977    benign    BREAST SURGERY Left     biopsy    CHOLECYSTECTOMY      COLONOSCOPY      CYSTOSCOPY N/A 3/31/2022    Procedure: CYSTOSCOPY;  Surgeon: Lorri Huizar MD;  [facility];  Service: Gynecology Oncology    HERNIA REPAIR Right     inguinal    HYSTERECTOMY      IR PORT PLACEMENT  5/9/2022    JOINT REPLACEMENT Right     Knee  NV COLONOSCOPY FLX DX W/COLLJ SPEC WHEN PFRMD N/A 2/21/2017    Procedure: COLONOSCOPY with polypectomy and hemo clip marjan ;  Surgeon: Kirby Courtney MD;  Location: [facility]; Service: Gastroenterology    NV LAPAROSCOPY W TOT HYSTERECTUTERUS <=[address] TUBE/OVARY N/A 3/31/2022    Procedure: ROBOTIC HYSTERECTOMY, BILATERAL SALPINGO-OOPHORECTOMY, STAGING PERITONEAL AND OMENTAL BIOPSIES, BILATERAL PELVIC SENTINEL LYMPH NODE BIOPSIES;  Surgeon: Zoe Aldana MD;  [facility]: [facility];  Service: Gynecology Oncology       Meds/Allergies:  Prior to Admission medications    Medication Sig Start Date End Date Taking? Authorizing Provider   acetaminophen (TYLENOL) 650 mg CR tablet Take 1 tablet (650 mg total) by mouth every 6 (six) hours as needed for mild pain 3/31/22  Yes Vinnie Figueroa MD   amLODIPine (NORVASC) 10 mg tablet Take 10 mg by mouth daily   Yes Historical Provider, MD   hydrochlorothiazide (HYDRODIURIL) 12 5 mg tablet Take 12 5 mg by mouth daily   Yes Historical Provider, MD   LORazepam (ATIVAN) 1 mg tablet Take 1 tablet (1 mg total) by mouth every 8 (eight) hours as needed for anxiety (nausea or anxiety) 5/11/22  Yes Nicole Jose PA-C   omeprazole (PriLOSEC) 40 MG capsule Take 20 mg by mouth daily     Yes Historical Provider, MD   ondansetron (ZOFRAN) 8 mg tablet Take 1 tablet (8 mg total) by mouth every 8 (eight) hours as needed for nausea or vomiting 5/11/22  Yes Nicole Jose PA-C   denosumab (Prolia) 60 mg/mL Inject 1 mL under the skin every 6 (six) months 10/22/21   Historical Provider, MD   fluticasone [facility]) 50 mcg/act nasal spray 2 sprays into each nostril daily as needed   8/5/19 5/9/22  Historical Provider, MD   hydrochlorothiazide (MICROZIDE) 12 5 mg capsule  4/24/22 5/17/22  Historical Provider, MD     I have reviewed home medications using recent Epic encounter      Allergies: No Known Allergies    Social History:  Marital Status: Single   Occupation: Retired  Patient Pre-hospital Living Situation: Home  with sister  Patient Pre-hospital Level of Mobility: walks without assistance  Patient Pre-hospital Diet Restrictions: none  Substance Use History:   Social History     Substance and Sexual Activity   Alcohol Use Yes    Alcohol/week: 1 0 standard drink    Types: 1 Glasses of wine per week    Comment: social-seldom     Social History     Tobacco Use   Smoking Status Never Smoker   Smokeless Tobacco Never Used     Social History     Substance and Sexual Activity   Drug Use Never       Family History:  Family History   Problem Relation Age of Onset    Heart disease Mother     Prostate cancer Father 80    Diabetes Sister     No Known Problems Maternal Grandmother     No Known Problems Maternal Grandfather     No Known Problems Paternal Grandmother     No Known Problems Paternal Grandfather     Hypertension Sister     Transient ischemic attack Sister     No Known Problems Sister     No Known Problems Maternal Aunt     No Known Problems Paternal Aunt     No Known Problems Paternal Aunt     No Known Problems Paternal Aunt     Diabetes Brother     Heart disease Brother     Stomach cancer Other     Thyroid disease Other        Physical Exam:     Vitals:   Blood Pressure: 124/63 (05/17/22 2230)  Pulse: 86 (05/17/22 2230)  Temperature: 97 7 °F (36 5 °C) (05/17/22 1928)  Temp Source: Oral (05/17/22 1928)  Respirations: 22 (05/17/22 2230)  Weight - Scale: 120 kg (265 lb 3 4 oz) (05/17/22 1928)  SpO2: 98 % (05/17/22 2230)    Physical Exam  Vitals and nursing note reviewed  Constitutional:       General: She is not in acute distress  Appearance: She is obese  She is not ill-appearing, toxic-appearing or diaphoretic  Comments: Awake and alert, no acute distress  Sister is at the bedside  HENT:      Head: Normocephalic and atraumatic        Nose: Nose normal       Mouth/Throat:      Mouth: Mucous membranes are moist    Eyes:      Extraocular Movements: Extraocular movements intact  Conjunctiva/sclera: Conjunctivae normal    Cardiovascular:      Rate and Rhythm: Normal rate and regular rhythm  Pulses: Normal pulses  Heart sounds: Normal heart sounds  Pulmonary:      Effort: No respiratory distress  Breath sounds: No stridor  No wheezing, rhonchi or rales  Comments: No adventitious breath sounds, decreased breath sounds on the left side  Patient is wearing nasal cannula oxygen, she is not having increased work of breathing at rest or with conversation  There is no chest wall tenderness  Chest:      Chest wall: No tenderness  Abdominal:      General: Bowel sounds are normal       Palpations: Abdomen is soft  Musculoskeletal:      Cervical back: Neck supple  Comments: There is bilateral peripheral edema left greater than right which patient says is about at her baseline  She has had a left ankle problem which causes the left side to always be more swollen than the right  Skin:     General: Skin is warm and dry  Comments: Abdominal wound   Neurological:      General: No focal deficit present  Mental Status: She is alert and oriented to person, place, and time     Psychiatric:         Mood and Affect: Mood normal          Behavior: Behavior normal           Additional Data:     Lab Results:  Results from last 7 days   Lab Units 05/17/22 1958   WBC Thousand/uL 5 05   HEMOGLOBIN g/dL 12 1   HEMATOCRIT % 37 0   PLATELETS Thousands/uL 98*   BANDS PCT % 1   LYMPHO PCT % 45*   MONO PCT % 4   EOS PCT % 11*     Results from last 7 days   Lab Units 05/17/22 1958   SODIUM mmol/L 137   POTASSIUM mmol/L 3 0*   CHLORIDE mmol/L 99*   CO2 mmol/L 28   BUN mg/dL 20   CREATININE mg/dL 1 52*   ANION GAP mmol/L 10   CALCIUM mg/dL 8 6   ALBUMIN g/dL 3 3*   TOTAL BILIRUBIN mg/dL 0 51   ALK PHOS U/L 79   ALT U/L 54   AST U/L 38   GLUCOSE RANDOM mg/dL 154*     Results from last 7 days   Lab Units 05/17/22 2130   INR  1 13 Imaging: Reviewed radiology reports from this admission including: CTA PE  CTA ED chest PE Study   Final Result by Isidra Lizama MD (05/17 2109)         1  Acute pulmonary thromboembolism bilaterally with extensive clot burden in the right and left pulmonary arteries, lobar arteries and segmental arteries with an RV/LV ratio of 1 5 indicative of right heart strain  Please see comments below  The calculated ratio of right ventricular to left ventricular diameter (RV/LV ratio) is 1 5      This is greater than 0 9, which is abnormal and indicates right heart strain  An abnormal RV/LV ratio has been shown to be associated with an increased risk of 30 day mortality in the setting of acute pulmonary embolism  Urgent consultation with the    medical critical care team is recommended  Findings were discussed with Dr Virginie Cox in the emergency department at the time of this dictation  Workstation performed: [id number]             EKG and Other Studies Reviewed on Admission:   · EKG: NSR  HR 80     ** Please Note: This note has been constructed using a voice recognition system   ** [phone]

## 2023-08-17 NOTE — PROGRESS NOTE ADULT - NUTRITIONAL ASSESSMENT
This patient has been assessed with a concern for Malnutrition and has been determined to have a diagnosis/diagnoses of Severe protein-calorie malnutrition.    This patient is being managed with:   Diet Regular-  Supplement Feeding Modality:  Oral  Ensure Plus High Protein Cans or Servings Per Day:  3       Frequency:  Daily  Entered: Aug 12 2023 12:10PM  

## 2023-08-17 NOTE — PROGRESS NOTE ADULT - SUBJECTIVE AND OBJECTIVE BOX
Patient is a 83y old  Female who presents with a chief complaint of Frequent falls (17 Aug 2023 11:24)      SUBJECTIVE / OVERNIGHT EVENTS:  Pt seen and examined.  No acute events overnight. Limited ROS on account of cognitive impairment. Denies any acute c/o.        MEDICATIONS  (STANDING):  enoxaparin Injectable 40 milliGRAM(s) SubCutaneous every 24 hours  polyethylene glycol 3350 17 Gram(s) Oral daily  senna 2 Tablet(s) Oral at bedtime    MEDICATIONS  (PRN):  acetaminophen     Tablet .. 650 milliGRAM(s) Oral every 6 hours PRN Temp greater or equal to 38C (100.4F), Mild Pain (1 - 3)  aluminum hydroxide/magnesium hydroxide/simethicone Suspension 30 milliLiter(s) Oral every 4 hours PRN Dyspepsia  melatonin 3 milliGRAM(s) Oral at bedtime PRN Insomnia  ondansetron Injectable 4 milliGRAM(s) IV Push every 8 hours PRN Nausea and/or Vomiting      Vital Signs Last 24 Hrs  T(C): 36.4 (17 Aug 2023 11:34), Max: 36.7 (16 Aug 2023 20:14)  T(F): 97.6 (17 Aug 2023 11:34), Max: 98.1 (16 Aug 2023 20:14)  HR: 71 (17 Aug 2023 11:34) (71 - 73)  BP: 99/60 (17 Aug 2023 11:34) (99/60 - 108/73)  BP(mean): --  RR: 18 (17 Aug 2023 11:34) (18 - 18)  SpO2: 96% (17 Aug 2023 11:34) (96% - 99%)    Parameters below as of 17 Aug 2023 11:34  Patient On (Oxygen Delivery Method): room air      CAPILLARY BLOOD GLUCOSE        I&O's Summary    16 Aug 2023 07:01  -  17 Aug 2023 07:00  --------------------------------------------------------  IN: 450 mL / OUT: 0 mL / NET: 450 mL        PHYSICAL EXAM:  GENERAL: NAD, frail  HEAD:  Atraumatic, Normocephalic  EYES: conjunctiva and sclera clear  NECK: Supple, No JVD  CHEST/LUNG: Clear to auscultation bilaterally; No wheeze  HEART: Regular rate and rhythm; No murmurs, rubs, or gallops  ABDOMEN: Soft, Nontender, Nondistended; Bowel sounds present  EXTREMITIES:  2+ Peripheral Pulses, No clubbing, cyanosis, or edema  PSYCH: AAOx1  NEUROLOGY: non-focal  SKIN: No rashes or lesions

## 2023-08-17 NOTE — DISCHARGE NOTE NURSING/CASE MANAGEMENT/SOCIAL WORK - NSDCFUADDAPPT_GEN_ALL_CORE_FT
APPTS ARE READY TO BE MADE: [x ] YES    the visiting nurse will contact you to schedule your appointment.     Best Family or Patient Contact (if needed):    Additional Information about above appointments (if needed):    1:   2:   3:     Other comments or requests:

## 2023-08-17 NOTE — PROGRESS NOTE ADULT - PROBLEM SELECTOR PROBLEM 4
Moderate protein-calorie malnutrition

## 2023-08-17 NOTE — PROGRESS NOTE ADULT - TIME BILLING
- Ordering, reviewing, and interpreting labs, testing, and imaging.  - Independently obtaining a review of systems and performing a physical exam  - Reviewing consultant documentation/recommendations in addition to discussing plan of care with consultants.  - Counselling and educating patient and family regarding interpretation of aforementioned items and plan of care.
case complexity and discharge planning. Pt is clinically stable for discharge home. She is to f/up with PCP in 1-2 weeks.      d/w ACP Geovanna

## 2023-08-17 NOTE — DISCHARGE NOTE NURSING/CASE MANAGEMENT/SOCIAL WORK - PATIENT PORTAL LINK FT
You can access the FollowMyHealth Patient Portal offered by [facility] by registering at the following website: [email or web address]. By joining [facility]’s FollowMyHealth portal, you will also be able to view your health information using other applications (apps) compatible with our system.

## 2023-12-05 NOTE — PROGRESS NOTE ADULT - PROBLEM SELECTOR PLAN 2
CT L & T spine: age indeterminate compression fx  c/o back pain with ambulation  Ortho recs WBAT with PT/OT  s/p TLSO brace for stability  PT reccs JENIFFER ; family would however want her discharged home ; family has obtained night time care  HHA available for 8 hours during the day  Pt is clinically stable for discharge home today no

## 2023-12-28 NOTE — PHYSICAL THERAPY INITIAL EVALUATION ADULT - GENERAL OBSERVATIONS, REHAB EVAL
Pt a/w Fall - CT L/T spine w/ indeterminate compression fx. Upon PT arrival, OT ALly at bedside, +interpretor utilized for [place] language, social hx obtained. ROM/MMT assessed in supine. +IVL, +homar, A&OX2, Kyrgyz speaking
show

## 2023-12-29 NOTE — PATIENT PROFILE ADULT - INTERPRETATION SERVICES DECLINED
Nandini message from Susan requesting aspirin prescription to be renewed and sent to  specialty pharmacy. Prescription sent.   Patient/Caregiver requests family/friend to interpret.

## 2024-09-25 NOTE — ED ADULT TRIAGE NOTE - WEIGHT IN KG
Problem: Knowledge Deficit  Goal: Patient/family/caregiver demonstrates understanding of disease process, treatment plan, medications, and discharge instructions  Description: Complete learning assessment and assess knowledge base.  Interventions:  - Provide teaching at level of understanding  - Provide teaching via preferred learning methods  Reactivated      46.7

## 2024-12-23 NOTE — PATIENT PROFILE ADULT - LANGUAGE ASSISTANCE NEEDED
Request for prior authorization on:     Medication: testosterone 20.25 MG/1.25GM (1.62%) gel   Dosage:  Route: Place 1 packet onto the skin daily. - Transderma   Quantity requested:  30 packet   Pharmacy for prescription has been selected.     Initiation of prior authorization needed.   
Yes-Patient/Caregiver accepts free interpretation services...

## 2025-01-10 NOTE — ED PROVIDER NOTE - NSCAREINITIATED _GEN_ER
Severe MR on 2023 cath and likely ischemic MR.  Echo to reevaluate.    1/10/25  -TTE with severe MR   Elza Rowe(Attending)

## 2025-05-09 NOTE — DISCHARGE NOTE NURSING/CASE MANAGEMENT/SOCIAL WORK - PATIENT PORTAL LINK FT
Aurelia Bell is a 71 year old female who presents for UTI (Urinary urgency and frequency x 1 week. )    HPI  The patient presents for evaluation of suspected urinary tract infection (UTI).    She reports frequent urination, which she suspects may be indicative of a UTI. The onset of these symptoms was approximately one week ago, and they have progressively worsened. This morning, she experienced mild soreness, but is uncertain if it is related to her recent physical activities, including heavy lifting. The pain, described as minimal and non-radiating, has since subsided. She recalls a similar episode of hematuria last month, but without the current urgency. She reports no associated nausea, vomiting, fevers, or chills. She had a UTI about nine years ago and two years ago, which were more severe than the current episode and characterized by a burning sensation during urination. Currently, she feels as though she is not fully emptying her bladder. Denies any other constitutional symptoms    She has a history of kidney stones in 01/2025, but the current pain does not resemble that associated with her previous renal calculi.    Review of Systems   All other systems reviewed and are negative.        Objective   Vitals:    05/09/25 1047   BP: 136/86   Pulse: 81   Resp: 14   Temp: 97.2 °F (36.2 °C)   TempSrc: Tympanic   SpO2: 96%   Weight: 65.3 kg (144 lb)   Height: 5' 2\" (1.575 m)   BMI (Calculated): 26.34     Physical Exam  Vitals reviewed.   Constitutional:       General: She is not in acute distress.     Appearance: Normal appearance. She is well-developed and normal weight. She is not ill-appearing, toxic-appearing or diaphoretic.   Cardiovascular:      Rate and Rhythm: Normal rate and regular rhythm.      Heart sounds: Normal heart sounds. No murmur heard.     No friction rub. No gallop.   Pulmonary:      Effort: Pulmonary effort is normal. No respiratory distress.      Breath sounds: Normal breath sounds. No  stridor. No wheezing, rhonchi or rales.   Chest:      Chest wall: No tenderness.   Abdominal:      General: Abdomen is flat. Bowel sounds are normal. There is no distension.      Palpations: Abdomen is soft. There is no mass.      Tenderness: There is no abdominal tenderness. There is no right CVA tenderness, left CVA tenderness, guarding or rebound.      Hernia: No hernia is present.   Musculoskeletal:         General: Normal range of motion.   Skin:     General: Skin is warm.   Neurological:      Mental Status: She is alert and oriented to person, place, and time.   Psychiatric:         Mood and Affect: Mood normal.           Arabella was seen today for uti.    Diagnoses and all orders for this visit:    Lower urinary tract symptoms (LUTS)  -     POCT Urine Dip Auto  -     Urine, Bacterial Culture    Other orders  -     cephalexin (KEFLEX) 500 MG capsule; Take 1 capsule by mouth in the morning and 1 capsule at noon and 1 capsule in the evening. Do all this for 7 days.      Patient with frequency for the past week, gradually worsening.  Had some right lower abdominal discomfort that has resolved.  No red flag symptoms like fevers chills nausea vomiting.  Patient is afebrile hemodynamically stable.  Etiology possibly cystitis will cover empirically send culture  The patient has been properly counseled about the above diagnosis. All questions were answered and the patient was in agreement with the diagnosis, treatment and discharge plan. Patient understood and knows when and/or if to seek immediate medical attention (if symptoms worsen or do not improve, seek immediate medical attention or follow up with your Primary Care Physician in 3-5 days).           You can access the FollowMyHealth Patient Portal offered by [facility] by registering at the following website: [email or web address]. By joining [facility] FollowMyHealth portal, you will also be able to view your health information using other applications (apps) compatible with our system.

## 2025-05-23 NOTE — ED ADULT NURSE NOTE - [id number]_ED_ALL_ED
Patient here for bilateral foot swelling and foot pain.   Serene Hess MD   Electronically signed by Simran Harp LPN on 5/23/2025 at 8:42 AM     No